# Patient Record
Sex: FEMALE | Race: WHITE | NOT HISPANIC OR LATINO | Employment: UNEMPLOYED | URBAN - METROPOLITAN AREA
[De-identification: names, ages, dates, MRNs, and addresses within clinical notes are randomized per-mention and may not be internally consistent; named-entity substitution may affect disease eponyms.]

---

## 2018-12-16 ENCOUNTER — OFFICE VISIT (OUTPATIENT)
Dept: URGENT CARE | Facility: CLINIC | Age: 43
End: 2018-12-16
Payer: COMMERCIAL

## 2018-12-16 VITALS
HEIGHT: 60 IN | HEART RATE: 83 BPM | DIASTOLIC BLOOD PRESSURE: 98 MMHG | BODY MASS INDEX: 34.36 KG/M2 | OXYGEN SATURATION: 96 % | SYSTOLIC BLOOD PRESSURE: 146 MMHG | WEIGHT: 175 LBS | RESPIRATION RATE: 19 BRPM | TEMPERATURE: 98 F

## 2018-12-16 DIAGNOSIS — J06.9 VIRAL URI WITH COUGH: Primary | ICD-10-CM

## 2018-12-16 PROCEDURE — 96372 THER/PROPH/DIAG INJ SC/IM: CPT | Mod: S$GLB,,, | Performed by: NURSE PRACTITIONER

## 2018-12-16 PROCEDURE — 3008F BODY MASS INDEX DOCD: CPT | Mod: CPTII,S$GLB,, | Performed by: NURSE PRACTITIONER

## 2018-12-16 PROCEDURE — 99214 OFFICE O/P EST MOD 30 MIN: CPT | Mod: 25,S$GLB,, | Performed by: NURSE PRACTITIONER

## 2018-12-16 RX ORDER — FLUTICASONE PROPIONATE 50 MCG
1 SPRAY, SUSPENSION (ML) NASAL DAILY
Qty: 1 BOTTLE | Refills: 0 | Status: SHIPPED | OUTPATIENT
Start: 2018-12-16 | End: 2022-04-29 | Stop reason: SDUPTHER

## 2018-12-16 RX ORDER — BETAMETHASONE SODIUM PHOSPHATE AND BETAMETHASONE ACETATE 3; 3 MG/ML; MG/ML
9 INJECTION, SUSPENSION INTRA-ARTICULAR; INTRALESIONAL; INTRAMUSCULAR; SOFT TISSUE
Status: COMPLETED | OUTPATIENT
Start: 2018-12-16 | End: 2018-12-16

## 2018-12-16 RX ADMIN — BETAMETHASONE SODIUM PHOSPHATE AND BETAMETHASONE ACETATE 9 MG: 3; 3 INJECTION, SUSPENSION INTRA-ARTICULAR; INTRALESIONAL; INTRAMUSCULAR; SOFT TISSUE at 05:12

## 2018-12-16 NOTE — PATIENT INSTRUCTIONS
A cold is caused by a virus that can settle in your nose, throat or lungs. This causes  a runny or stuffy nose and sneezing. You may also have a sore throat, cough, headache, fever and muscle aches. Different cold viruses last different lengths  of time, but the average time is 2 to 14 days.    Seek immediate medical care if you develop fever, chest pain, or shortness of breath.     Treatment  There is no cure for the common cold.     Antibiotics may be used to treat signs of a secondary infection, but they do not treat  the cold virus. Try these tips to  keep yourself comfortable:  -Get plenty of rest.  -Drink plenty of fluids, at least 8 large glasses of fluid a day. Good fluidchoices are water, fruit juices high in Vitamin C, tea, gelatin, or broths and soups. These help to keep mucus thin and ease congestion.  -Use salt water gargle, cough drops or throat sprays to relieve throat pain. Mi ¼ to ½ teaspoon of salt in 1 cup of warm water for a salt water gargle  solution.  -Use petroleum jelly or lip balm around lips and nose to prevent chapping.  -Use saline nose drops or spray to help ease congestion.    Over the Counter (OTC) Medicines:  Take over the counter medicines as needed to ease your signs.  Read labels carefully.  Use a product that treats only the signs that you have. Ask your pharmacist  for recommendations. Be sure to ask about possible interactions with other  medicines you are taking.  Common medicines used to treat signs of a cold include:    #Antihistamines that dry secretions in your nose and lungs. Some of these  may cause you to feel drowsy. Talk to your pharmacist before use if you  have glaucoma or an enlarged prostate.  Names of some medicines in this group include:  - Diphenhydramine  - Brompheniramine  - Chlorpheniramine  - Clemastine    # Decongestants that tighten blood vessels in your nose to decrease  stuffiness and pressure. Use nasal spray decongestants for up to three days  only.  Longer use can make congestion worse. Talk to your pharmacist  before use if you have high blood pressure, heart disease, diabetes or an  enlarged prostate.    Names of some medicines in this group include:  - Pseudoephedrine (Sudafed)- kept behind the counter and requires identification  to purchase in limited quantities because it can be used to make illegal  drugs  - Phenylephrine  - Oxymetazoline nasal spray (Afrin)  - Cough suppressant, also called antitussive, such as dextromethorphan.  This medicine decreases your reflex and sensitivity to cough. This  medicine may be kept behind the pharmacy counter for purchase.  - Expectorant, sometimes called mucolytic, such as guaifenesin (mucinex). This  medicine thins mucus secretions in the lungs to make it easier for you to  cough up and out. (Be sure to drink plenty of fluids when taking this medication)  Cold and cough medicines often contain more than one type of medicine.  Ask the pharmacist for help to confirm that you are not using more than one  product with the same or similar ingredient. For example, some cold and  cough medicines have acetaminophen or ibuprofen in them to help lower a  fever or ease muscle aches. Do not take extra acetaminophen (Tylenol) or  ibuprofen (Advil, Motrin) if the cold or cough medicine has it as an  ingredient. Too much medicine could be harmful.    Take the correct dose as listed on the package. Do not take more than  recommended.    Use a Humidifier:  A cool mist humidifier can make breathing easier by thinning mucus. Do not use  a steam humidifier as hot water can cause burns if spilled.  Place the humidifier a few feet from the bed. Drain and clean each day with  soap and water to prevent bacteria and mold from growing.  Indoor humidity should not be above 50%. Stop using the humidifier if you  notice moisture on windows, walls or pictures.  You do not need to add any medicine to the humidifier.  If you cannot get a  humidifier, place a pan of water next to heating vents and  refill the water level daily. The water will evaporate and add moisture to the  Room.    How to prevent the spread of colds  -Wash your hands with soap and water or use alcohol based hand   often. Dry hands wet from washing with soap on a paper towel instead of cloth towel.  -Cough or sneeze into your elbow to avoid spreading germs.  -Wipe down common surfaces, such as door knobs and faucet handles, with a disinfectant spray.  -Do not share cups or utensils.       -Steroid given here today.  -Flonase daily.  -Sudafed as needed for congestion.  -Mucinex DM.  -Increase your fluid intake.  Please follow up with your Primary care provider within 2-5 days if your signs and symptoms have not resolved or worsen.     If your condition worsens or fails to improve we recommend that you receive another evaluation at the emergency room immediately or contact your primary medical clinic to discuss your concerns.   You must understand that you have received an Urgent Care treatment only and that you may be released before all of your medical problems are known or treated. You, the patient, will arrange for follow up care as instructed.

## 2018-12-16 NOTE — PROGRESS NOTES
Subjective:       Patient ID: Audie Sandoval is a 43 y.o. female.    Vitals:  height is 5' (1.524 m) and weight is 79.4 kg (175 lb). Her oral temperature is 97.6 °F (36.4 °C). Her blood pressure is 146/98 (abnormal) and her pulse is 83. Her respiration is 19 and oxygen saturation is 96%.     Chief Complaint: Sinusitis (nasal congestion, headaches)    Sinusitis   This is a new problem. The current episode started in the past 7 days (2 days ago). The problem has been gradually worsening since onset. There has been no fever. She is experiencing no pain. Associated symptoms include congestion, coughing and headaches. Pertinent negatives include no chills, shortness of breath or sore throat. Treatments tried: Theraflu day. The treatment provided no relief.       Constitution: Negative for chills, fatigue and fever.   HENT: Positive for congestion. Negative for sore throat.    Neck: Negative for painful lymph nodes.   Cardiovascular: Negative for chest pain and leg swelling.   Eyes: Negative for double vision and blurred vision.   Respiratory: Positive for cough. Negative for shortness of breath.    Gastrointestinal: Negative for nausea, vomiting and diarrhea.   Genitourinary: Negative for dysuria, frequency, urgency and history of kidney stones.   Musculoskeletal: Negative for joint pain, joint swelling, muscle cramps and muscle ache.   Skin: Negative for color change, pale, rash and bruising.   Allergic/Immunologic: Negative for seasonal allergies.   Neurological: Positive for headaches. Negative for dizziness, history of vertigo, light-headedness and passing out.   Hematologic/Lymphatic: Negative for swollen lymph nodes.   Psychiatric/Behavioral: Negative for nervous/anxious, sleep disturbance and depression. The patient is not nervous/anxious.        Objective:      Physical Exam   Constitutional: She is oriented to person, place, and time. She appears well-developed and well-nourished. She is cooperative.   Non-toxic appearance. She does not appear ill. No distress.   HENT:   Head: Normocephalic and atraumatic.   Right Ear: Hearing, external ear and ear canal normal. Tympanic membrane is bulging.   Left Ear: Hearing, external ear and ear canal normal. Tympanic membrane is bulging.   Nose: Mucosal edema and rhinorrhea present. No nasal deformity. No epistaxis. Right sinus exhibits no maxillary sinus tenderness and no frontal sinus tenderness. Left sinus exhibits no maxillary sinus tenderness and no frontal sinus tenderness.   Mouth/Throat: Uvula is midline and mucous membranes are normal. No trismus in the jaw. Normal dentition. No uvula swelling. Posterior oropharyngeal erythema present. Tonsils are 1+ on the right. Tonsils are 1+ on the left. No tonsillar exudate.   Eyes: Conjunctivae and lids are normal. No scleral icterus.   Sclera clear bilat   Neck: Trachea normal, full passive range of motion without pain and phonation normal. Neck supple.   Cardiovascular: Normal rate, regular rhythm, normal heart sounds, intact distal pulses and normal pulses.   Pulmonary/Chest: Effort normal and breath sounds normal. No respiratory distress.   Abdominal: Soft. Normal appearance and bowel sounds are normal. She exhibits no distension. There is no tenderness.   Musculoskeletal: Normal range of motion. She exhibits no edema or deformity.   Neurological: She is alert and oriented to person, place, and time. She exhibits normal muscle tone. Coordination normal.   Skin: Skin is warm, dry and intact. She is not diaphoretic. No pallor.   Psychiatric: She has a normal mood and affect. Her speech is normal and behavior is normal. Judgment and thought content normal. Cognition and memory are normal.   Nursing note and vitals reviewed.      Assessment:       1. Viral URI with cough        Plan:         Viral URI with cough  -     betamethasone acetate-betamethasone sodium phosphate injection 9 mg  -     fluticasone (FLONASE) 50  mcg/actuation nasal spray; 1 spray (50 mcg total) by Each Nare route once daily.  Dispense: 1 Bottle; Refill: 0      Patient Instructions   A cold is caused by a virus that can settle in your nose, throat or lungs. This causes  a runny or stuffy nose and sneezing. You may also have a sore throat, cough, headache, fever and muscle aches. Different cold viruses last different lengths  of time, but the average time is 2 to 14 days.    Seek immediate medical care if you develop fever, chest pain, or shortness of breath.     Treatment  There is no cure for the common cold.     Antibiotics may be used to treat signs of a secondary infection, but they do not treat  the cold virus. Try these tips to  keep yourself comfortable:  -Get plenty of rest.  -Drink plenty of fluids, at least 8 large glasses of fluid a day. Good fluidchoices are water, fruit juices high in Vitamin C, tea, gelatin, or broths and soups. These help to keep mucus thin and ease congestion.  -Use salt water gargle, cough drops or throat sprays to relieve throat pain. Mi ¼ to ½ teaspoon of salt in 1 cup of warm water for a salt water gargle  solution.  -Use petroleum jelly or lip balm around lips and nose to prevent chapping.  -Use saline nose drops or spray to help ease congestion.    Over the Counter (OTC) Medicines:  Take over the counter medicines as needed to ease your signs.  Read labels carefully.  Use a product that treats only the signs that you have. Ask your pharmacist  for recommendations. Be sure to ask about possible interactions with other  medicines you are taking.  Common medicines used to treat signs of a cold include:    #Antihistamines that dry secretions in your nose and lungs. Some of these  may cause you to feel drowsy. Talk to your pharmacist before use if you  have glaucoma or an enlarged prostate.  Names of some medicines in this group include:  - Diphenhydramine  - Brompheniramine  - Chlorpheniramine  - Clemastine    #  Decongestants that tighten blood vessels in your nose to decrease  stuffiness and pressure. Use nasal spray decongestants for up to three days  only. Longer use can make congestion worse. Talk to your pharmacist  before use if you have high blood pressure, heart disease, diabetes or an  enlarged prostate.    Names of some medicines in this group include:  - Pseudoephedrine (Sudafed)- kept behind the counter and requires identification  to purchase in limited quantities because it can be used to make illegal  drugs  - Phenylephrine  - Oxymetazoline nasal spray (Afrin)  - Cough suppressant, also called antitussive, such as dextromethorphan.  This medicine decreases your reflex and sensitivity to cough. This  medicine may be kept behind the pharmacy counter for purchase.  - Expectorant, sometimes called mucolytic, such as guaifenesin (mucinex). This  medicine thins mucus secretions in the lungs to make it easier for you to  cough up and out. (Be sure to drink plenty of fluids when taking this medication)  Cold and cough medicines often contain more than one type of medicine.  Ask the pharmacist for help to confirm that you are not using more than one  product with the same or similar ingredient. For example, some cold and  cough medicines have acetaminophen or ibuprofen in them to help lower a  fever or ease muscle aches. Do not take extra acetaminophen (Tylenol) or  ibuprofen (Advil, Motrin) if the cold or cough medicine has it as an  ingredient. Too much medicine could be harmful.    Take the correct dose as listed on the package. Do not take more than  recommended.    Use a Humidifier:  A cool mist humidifier can make breathing easier by thinning mucus. Do not use  a steam humidifier as hot water can cause burns if spilled.  Place the humidifier a few feet from the bed. Drain and clean each day with  soap and water to prevent bacteria and mold from growing.  Indoor humidity should not be above 50%. Stop using the  humidifier if you  notice moisture on windows, walls or pictures.  You do not need to add any medicine to the humidifier.  If you cannot get a humidifier, place a pan of water next to heating vents and  refill the water level daily. The water will evaporate and add moisture to the  Room.    How to prevent the spread of colds  -Wash your hands with soap and water or use alcohol based hand   often. Dry hands wet from washing with soap on a paper towel instead of cloth towel.  -Cough or sneeze into your elbow to avoid spreading germs.  -Wipe down common surfaces, such as door knobs and faucet handles, with a disinfectant spray.  -Do not share cups or utensils.       -Steroid given here today.  -Flonase daily.  -Sudafed as needed for congestion.  -Mucinex DM.  -Increase your fluid intake.  Please follow up with your Primary care provider within 2-5 days if your signs and symptoms have not resolved or worsen.     If your condition worsens or fails to improve we recommend that you receive another evaluation at the emergency room immediately or contact your primary medical clinic to discuss your concerns.   You must understand that you have received an Urgent Care treatment only and that you may be released before all of your medical problems are known or treated. You, the patient, will arrange for follow up care as instructed.

## 2020-11-09 LAB — HPV OTHER HR TYPES: NEGATIVE

## 2020-12-03 ENCOUNTER — OFFICE VISIT (OUTPATIENT)
Dept: URGENT CARE | Facility: CLINIC | Age: 45
End: 2020-12-03
Payer: COMMERCIAL

## 2020-12-03 VITALS
WEIGHT: 175 LBS | TEMPERATURE: 99 F | BODY MASS INDEX: 34.36 KG/M2 | OXYGEN SATURATION: 97 % | DIASTOLIC BLOOD PRESSURE: 105 MMHG | SYSTOLIC BLOOD PRESSURE: 177 MMHG | HEIGHT: 60 IN | RESPIRATION RATE: 14 BRPM | HEART RATE: 140 BPM

## 2020-12-03 DIAGNOSIS — R00.0 TACHYCARDIA WITH HEART RATE 121-140 BEATS PER MINUTE: ICD-10-CM

## 2020-12-03 DIAGNOSIS — U07.1 COVID-19: Primary | ICD-10-CM

## 2020-12-03 DIAGNOSIS — R03.0 ELEVATED BLOOD PRESSURE READING IN OFFICE WITHOUT DIAGNOSIS OF HYPERTENSION: ICD-10-CM

## 2020-12-03 LAB
CTP QC/QA: YES
SARS-COV-2 RDRP RESP QL NAA+PROBE: POSITIVE

## 2020-12-03 PROCEDURE — U0002 COVID-19 LAB TEST NON-CDC: HCPCS | Mod: QW,S$GLB,, | Performed by: STUDENT IN AN ORGANIZED HEALTH CARE EDUCATION/TRAINING PROGRAM

## 2020-12-03 PROCEDURE — U0002: ICD-10-PCS | Mod: QW,S$GLB,, | Performed by: STUDENT IN AN ORGANIZED HEALTH CARE EDUCATION/TRAINING PROGRAM

## 2020-12-03 PROCEDURE — 99214 OFFICE O/P EST MOD 30 MIN: CPT | Mod: S$GLB,,, | Performed by: STUDENT IN AN ORGANIZED HEALTH CARE EDUCATION/TRAINING PROGRAM

## 2020-12-03 PROCEDURE — 3008F BODY MASS INDEX DOCD: CPT | Mod: CPTII,S$GLB,, | Performed by: STUDENT IN AN ORGANIZED HEALTH CARE EDUCATION/TRAINING PROGRAM

## 2020-12-03 PROCEDURE — 99214 PR OFFICE/OUTPT VISIT, EST, LEVL IV, 30-39 MIN: ICD-10-PCS | Mod: S$GLB,,, | Performed by: STUDENT IN AN ORGANIZED HEALTH CARE EDUCATION/TRAINING PROGRAM

## 2020-12-03 PROCEDURE — 3008F PR BODY MASS INDEX (BMI) DOCUMENTED: ICD-10-PCS | Mod: CPTII,S$GLB,, | Performed by: STUDENT IN AN ORGANIZED HEALTH CARE EDUCATION/TRAINING PROGRAM

## 2020-12-04 DIAGNOSIS — U07.1 COVID-19 VIRUS DETECTED: ICD-10-CM

## 2020-12-04 NOTE — PATIENT INSTRUCTIONS
 You have tested positive for COVID-19 today.  Please note that patients who test positive for COVID-19 are required by the CDC to undergo isolation for 10 days after their symptoms first began.     This isolation starts from the day you first developed symptoms, not the day of your positive test. For example, if your symptoms began on a Monday but tested positive on the following Wednesday, your 10-day isolation begins from that Monday, not the Wednesday you tested positive.     However, if you are asymptomatic (a person who does not have any symptoms) and COVID-19 positive, your 10-day isolation begins on the day you tested positive, regardless of exposure date.     Also, per the CDC guidelines, once your 10 days have passed, and you have not had fever greater than 100.4F in the last 24 hours without taking any fever reducers such as Tylenol (Acetaminophen) or Motrin (Ibuprofen), you may return to your normal activities including social distancing, wearing masks, and frequent handwashing - YOU DO NOT NEED ANOTHER TEST IN ORDER TO END YOUR QUARANTINE.

## 2020-12-04 NOTE — PROGRESS NOTES
Subjective:       Patient ID: Audie Sandoval is a 45 y.o. female.    Vitals:  height is 5' (1.524 m) and weight is 79.4 kg (175 lb). Her temperature is 98.6 °F (37 °C). Her blood pressure is 177/105 (abnormal) and her pulse is 128 (abnormal). Her respiration is 14 and oxygen saturation is 97%.     Chief Complaint: Fever    Pt c/o mild fever since Tuesday pm    Fever   This is a new problem. The current episode started in the past 7 days. The problem occurs every several days. Pertinent negatives include no chest pain, congestion, coughing, diarrhea, ear pain, headaches, muscle aches, nausea, rash, sore throat, urinary pain or vomiting. She has tried acetaminophen (steroid injection) for the symptoms. The treatment provided mild relief.     Patient just left an IV infusion place where she received multiple vitamin and steroid infusions.       Constitution: Positive for fever. Negative for chills and fatigue.   HENT: Negative for ear pain, congestion and sore throat.    Neck: Negative for painful lymph nodes.   Cardiovascular: Negative for chest pain and leg swelling.   Eyes: Negative for double vision and blurred vision.   Respiratory: Negative for cough and shortness of breath.    Gastrointestinal: Negative for nausea, vomiting and diarrhea.   Genitourinary: Negative for dysuria, frequency, urgency and history of kidney stones.   Musculoskeletal: Negative for joint pain, joint swelling, muscle cramps and muscle ache.   Skin: Negative for color change, pale, rash and bruising.   Allergic/Immunologic: Negative for seasonal allergies.   Neurological: Negative for dizziness, history of vertigo, light-headedness, passing out and headaches.   Hematologic/Lymphatic: Negative for swollen lymph nodes.   Psychiatric/Behavioral: Negative for nervous/anxious, sleep disturbance and depression. The patient is not nervous/anxious.        Objective:      Physical Exam   Constitutional: She is oriented to person, place, and time.  "No distress.   HENT:   Head: Normocephalic.   Eyes: Conjunctivae are normal. Right eye exhibits no discharge. Left eye exhibits no discharge.   Cardiovascular: Tachycardia present.   Pulmonary/Chest: Effort normal and breath sounds normal. No respiratory distress.   Abdominal: Normal appearance.   Neurological: She is alert and oriented to person, place, and time. Coordination normal.   Skin: Skin is not diaphoretic. Psychiatric: Her behavior is normal. Mood normal.   Nursing note and vitals reviewed.        Assessment:       1. COVID-19    2. Tachycardia with heart rate 121-140 beats per minute        Plan:         COVID-19  -     POCT COVID-19 Rapid Screening    Tachycardia with heart rate 121-140 beats per minute           Tachycardic with -140. No evidence of respiratory distress noted, able to speak in complete sentences without pause.    Requesting COVID-19 testing given symptoms. Tested for COVID-19 today. Rapid test was Positive. Educated on COVID-19 and COVID-19 testing. Advised on COVID-19 precautions.     Due to tachycardia, recommended patient have EKG which she refused stating "I known my body and I'm just anxious." ED evaluation also strongly advised due to concern for arrythmia related to COVID. She refused this also and left AMA.             "

## 2020-12-06 ENCOUNTER — NURSE TRIAGE (OUTPATIENT)
Dept: ADMINISTRATIVE | Facility: CLINIC | Age: 45
End: 2020-12-06

## 2020-12-06 NOTE — TELEPHONE ENCOUNTER
Pt only had a question regarding COVID-19. No triage done.    Reason for Disposition   Information only question and nurse able to answer    Additional Information   Negative: Nursing judgment   Negative: Nursing judgment   Negative: Nursing judgment   Negative: Nursing judgment    Protocols used: INFORMATION ONLY CALL - NO TRIAGE-A-[place]

## 2020-12-08 ENCOUNTER — TELEPHONE (OUTPATIENT)
Dept: INTERNAL MEDICINE | Facility: CLINIC | Age: 45
End: 2020-12-08

## 2020-12-08 ENCOUNTER — NURSE TRIAGE (OUTPATIENT)
Dept: ADMINISTRATIVE | Facility: CLINIC | Age: 45
End: 2020-12-08

## 2020-12-08 ENCOUNTER — OFFICE VISIT (OUTPATIENT)
Dept: URGENT CARE | Facility: CLINIC | Age: 45
End: 2020-12-08
Payer: COMMERCIAL

## 2020-12-08 VITALS
RESPIRATION RATE: 16 BRPM | BODY MASS INDEX: 34.36 KG/M2 | SYSTOLIC BLOOD PRESSURE: 177 MMHG | TEMPERATURE: 99 F | OXYGEN SATURATION: 97 % | HEIGHT: 60 IN | HEART RATE: 88 BPM | DIASTOLIC BLOOD PRESSURE: 115 MMHG | WEIGHT: 175 LBS

## 2020-12-08 DIAGNOSIS — R03.0 ELEVATED BLOOD PRESSURE READING IN OFFICE WITHOUT DIAGNOSIS OF HYPERTENSION: ICD-10-CM

## 2020-12-08 DIAGNOSIS — U07.1 COVID-19: Primary | ICD-10-CM

## 2020-12-08 DIAGNOSIS — I10 HYPERTENSION, UNSPECIFIED TYPE: ICD-10-CM

## 2020-12-08 LAB
B-HCG UR QL: NEGATIVE
BILIRUB UR QL STRIP: NEGATIVE
CTP QC/QA: YES
GLUCOSE SERPL-MCNC: 98 MG/DL (ref 70–110)
GLUCOSE UR QL STRIP: NEGATIVE
KETONES UR QL STRIP: POSITIVE
LEUKOCYTE ESTERASE UR QL STRIP: NEGATIVE
PH, POC UA: 5 (ref 5–8)
POC ANION GAP: 18 MMOL/L (ref 10–20)
POC BLOOD, URINE: NEGATIVE
POC BUN: 9 MMOL/L (ref 8–26)
POC CHLORIDE: 102 MMOL/L (ref 98–109)
POC CREATININE: 0.7 MG/DL (ref 0.6–1.3)
POC HEMATOCRIT: 44 %PCV (ref 37–47)
POC HEMOGLOBIN: 15 G/DL (ref 12.5–16)
POC ICA: 1.13 MMOL/L (ref 1.12–1.32)
POC NITRATES, URINE: NEGATIVE
POC POTASSIUM: 4.3 MMOL/L (ref 3.5–4.9)
POC SODIUM: 137 MMOL/L (ref 138–146)
POC TCO2: 23 MMOL/L (ref 24–29)
PROT UR QL STRIP: NEGATIVE
SP GR UR STRIP: 1.01 (ref 1–1.03)
UROBILINOGEN UR STRIP-ACNC: ABNORMAL (ref 0.1–1.1)

## 2020-12-08 PROCEDURE — 80047 POCT CHEMISTRY PANEL: ICD-10-PCS | Mod: QW,S$GLB,, | Performed by: PHYSICIAN ASSISTANT

## 2020-12-08 PROCEDURE — 3008F PR BODY MASS INDEX (BMI) DOCUMENTED: ICD-10-PCS | Mod: CPTII,S$GLB,, | Performed by: PHYSICIAN ASSISTANT

## 2020-12-08 PROCEDURE — 99214 OFFICE O/P EST MOD 30 MIN: CPT | Mod: 25,S$GLB,, | Performed by: PHYSICIAN ASSISTANT

## 2020-12-08 PROCEDURE — 81003 POCT URINALYSIS, DIPSTICK, AUTOMATED, W/O SCOPE: ICD-10-PCS | Mod: QW,S$GLB,, | Performed by: PHYSICIAN ASSISTANT

## 2020-12-08 PROCEDURE — 80047 BASIC METABLC PNL IONIZED CA: CPT | Mod: QW,S$GLB,, | Performed by: PHYSICIAN ASSISTANT

## 2020-12-08 PROCEDURE — 81003 URINALYSIS AUTO W/O SCOPE: CPT | Mod: QW,S$GLB,, | Performed by: PHYSICIAN ASSISTANT

## 2020-12-08 PROCEDURE — 3008F BODY MASS INDEX DOCD: CPT | Mod: CPTII,S$GLB,, | Performed by: PHYSICIAN ASSISTANT

## 2020-12-08 PROCEDURE — 71046 XR CHEST PA AND LATERAL: ICD-10-PCS | Mod: FY,S$GLB,, | Performed by: RADIOLOGY

## 2020-12-08 PROCEDURE — 99214 PR OFFICE/OUTPT VISIT, EST, LEVL IV, 30-39 MIN: ICD-10-PCS | Mod: 25,S$GLB,, | Performed by: PHYSICIAN ASSISTANT

## 2020-12-08 PROCEDURE — 71046 X-RAY EXAM CHEST 2 VIEWS: CPT | Mod: FY,S$GLB,, | Performed by: RADIOLOGY

## 2020-12-08 RX ORDER — AMLODIPINE BESYLATE 5 MG/1
5 TABLET ORAL DAILY
Qty: 30 TABLET | Refills: 0 | Status: SHIPPED | OUTPATIENT
Start: 2020-12-08 | End: 2020-12-15 | Stop reason: SDUPTHER

## 2020-12-08 NOTE — TELEPHONE ENCOUNTER
Spoke with pt and informed her we are not accepting new patients.    Set her up for new pt appt with provider at [facility].    ----- Message from Nacho Eaton sent at 12/8/2020  2:04 PM CST -----  Regarding: schedule  Contact: pt [phone]  Pt is calling to schedule an appt as a new pt. Please contact pt

## 2020-12-08 NOTE — PATIENT INSTRUCTIONS
Start amlodipine  Return or follow up with primary care in 2 days for blood pressure check  Make appointment with family medicine for follow up  Call [phone] for appointment first available    Please monitor blood pressure at home and keep log  Go to the ER for new, worsened or changing symptoms, chest pain, trouble breathing, visual changes or other concern      Please arrange follow up with your primary medical clinic as soon as possible. You must understand that you've received an Urgent Care treatment only and that you may be released before all of your medical problems are known or treated. You, the patient, will arrange for follow up as instructed. If your symptoms worsen or fail to improve you should go to the Emergency Room.  WE CANNOT RULE OUT ALL POSSIBLE CAUSES OF YOUR SYMPTOMS IN THE URGENT CARE SETTING PLEASE GO TO THE ER IF YOU FEELS YOUR CONDITION IS WORSENING OR YOU WOULD LIKE EMERGENT EVALUATION.        High Blood Pressure, New, Begin Treatment  Your blood pressure was high enough today to start treatment with medicines. Often health care providers dont know what causes high blood pressure (hypertension). But it can be controlled with lifestyle changes and medicines. High blood pressure usually has no symptoms. But it can sometimes cause headache, dizziness, blurred vision, a rushing sound in your ears, chest pain, or shortness of breath. But even without symptoms, high blood pressure thats not treated raises your risk for heart attack and stroke. High blood pressure is a serious health risk and shouldnt be ignored.    A blood pressure reading is made up of two numbers: a higher number over a lower number. The top number is the systolic pressure. The bottom number is the diastolic pressure. A normal blood pressure is a systolic pressure of less than 120 over a diastolic pressure less than 80. You will see your blood pressure readings written together. For example, a person with a systolic  pressure of 118 and a diastolic pressure of 78 will have 118/78 written in the medical record.  High blood pressure is when either the top number is 140 or higher, or the bottom number is 90 or higher. High blood pressure is diagnosed when multiple, separate readings show blood pressures above 140/90. The blood pressures between normal and high are called prehypertension.  Home care  If you have high blood pressure, you should do what is listed below to lower your blood pressure. If you are taking medicines for high blood pressure, these methods may reduce or end your need for medicines in the future.  · Begin a weight-loss program if you are overweight.  · Cut back on how much salt you get in your diet. Heres how to do this:  ¨ Dont eat foods that have a lot of salt. These include olives, pickles, smoked meats, and salted potato chips.  ¨ Dont add salt to your food at the table.  ¨ Use only small amounts of salt when cooking.  ¨ Review food labels to track how much salt is in prepared foods.  ¨ When eating out, ask that no additional salt be added to your food order.  · Begin an exercise program. Talk with your health care provider about the type of exercise program that would be best for you. It doesn't have to be hard. Even brisk walking for 20 minutes 3 times a week is a good form of exercise.  · Dont take medicines that have heart stimulants. This includes many over-the-counter cold and sinus decongestant pills and sprays, as well as diet pills. Check the warnings about hypertension on the label. Before purchasing any over-the-counter medicines or supplements, always ask the pharmacist about the product's potential interaction with your high blood pressure and your high blood pressure medicines.  · Stimulants such as amphetamine or cocaine could be lethal for someone with high blood pressure. Never take these.  · Limit how much caffeine you get in your diet. Switch to caffeine-free products.  · Stop  smoking. If you are a long-time smoker, this can be hard. Enroll in a stop-smoking program to make it more likely that you will quit for good.  · Learn how to handle stress. This is an important part of any program to lower blood pressure. Learn about relaxation methods like meditation, yoga, or biofeedback.  · If your provider prescribed medicines, take them exactly as directed. Missing doses may cause your blood pressure get out of control.  · If you miss a dose or doses, check with your healthcare provider or pharmacist about what to do.  · Consider buying an automatic blood pressure machine. Your provider can make a recommendation. You can get one of these at most pharmacies.  The American Heart Association recommends the following guidelines for home blood pressure monitoring:  · Don't smoke or drink coffee for 30 minutes before taking your blood pressure.  · Go to the bathroom before the test.  · Relax for 5 minutes before taking the measurement.  · Sit with your back supported (don't sit on a couch or soft chair); keep your feet on the floor uncrossed. Place your arm on a solid flat surface (like a table) with the upper part of the arm at heart level. Place the middle of the cuff directly above the eye of the elbow. Check the monitor's instruction manual for an illustration.  · Take multiple readings. When you measure, take 2 to 3 readings one minute apart and record all of the results.  · Take your blood pressure at the same time every day, or as your healthcare provider recommends.  · Record the date, time, and blood pressure reading.  · Take the record with you to your next medical appointment. If your blood pressure monitor has a built-in memory, simply take the monitor with you to your next appointment.  · Call your provider if you have several high readings. Don't be frightened by a single high blood pressure reading, but if you get several high readings, check in with your healthcare provider.  · Note:  When blood pressure reaches a systolic (top number) of 180 or higher OR diastolic (bottom number) of 110 or higher, seek emergency medical treatment.  Follow-up care  Because a new blood pressure medicine was started today, its important that you have your blood pressure rechecked. This is to make sure that the medicine is working and that you have no serious side effects. Keep all your follow up appointments. Write down medicine and blood pressure questions and bring them to your next appointment. If you have pressing concerns about your new medicine or your blood pressure, call your provider. Unless told otherwise, follow up with your health care provider or this facility within the next 3 days.  When to seek medical advice  Call your healthcare provider right away if any of these occur:  · Blood pressure reaches a systolic (top number) of 180 or higher, OR diastolic (bottom number) of 110 or higher, seek emergency medical treatment.  · Chest pain or shortness of breath  · Severe headache  · Throbbing or rushing sound in the ears  · Nosebleed  · Sudden severe pain in your belly (abdomen)  · Extreme drowsiness, confusion, or fainting  · Dizziness or dizziness with a spinning sensation (vertigo)  · Weakness of an arm or leg or one side of the face  · You have problems speaking or seeing   Date Last Reviewed: 12/1/2016  © 3113-5293 [employer]. [address] [place] [address], [place] [id number]. All rights reserved. This information is not intended as a substitute for professional medical care. Always follow your healthcare professional's instructions.        Discharge Instructions: Taking Your Blood Pressure  Blood pressure is the force of blood as it moves from the heart through the blood vessels. You can take your own blood pressure reading using a digital monitor. Take readings as often as your healthcare provider instructs. Take your readings each time in the same way, using the same arm. Here are  guidelines for taking your blood pressure.  The American Heart Association (AHA) recommends purchasing a blood pressure monitor that is validated and approved by the Association for the Advancement of Medical Instrumentation, the [place] Hypertension Society, and the International Protocol for the Validation of Automated BP Measuring Devices. If the blood pressure monitor is for a senior adult, a pregnant woman, or a child, make certain it is validated for use with such a population. For the most reliable readings, the AHA recommends an automatic, cuff-style, upper arm (bicep) monitor. The readings from finger and wrist monitors are not as reliable as the upper arm monitor.        Step 1. Relax    · Wait at least a half hour after smoking, eating, or exercising. Do not drink coffee, tea, soda, or other caffeinated beverages before checking your blood pressure.   · Sit comfortably at a table. Place the monitor near you.  · Rest for a few minutes before you begin.        Step 2. Wrap the cuff    · Place your arm on the table, palm up. Put your arm in a position that is level with your heart. Wrap the cuff around your upper arm, about an inch above your elbow. Its best to wrap the cuff on bare skin, not over clothing.  · Make sure your cuff fits. If it doesnt wrap around your upper arm, order a larger cuff. A cuff that is too large or too small can result in an inaccurate blood pressure reading.           Step 3. Inflate the cuff    · Pump the cuff until the scale reads 200. If you have a self-inflating cuff, push the button that starts the pump.  · The cuff will tighten, then loosen.  · The numbers will change. When they stop changing, your blood pressure reading will appear.  · If you get a reading that is too high or too low for you, relax for a few minutes. Then do the test again.    Step 4. Write down the results  · Write down your blood pressure numbers. Aneudy the date and time. Keep your results in one  place, such as a notebook.  · Remove the cuff from your arm. Turn off the machine.  · Take the readings with you to your medical appointments.  · If you start a new blood pressure medicine, or change a blood pressure medicine dose, note the day you started the new drug or dosage on your blood pressure recording sheet. This will help your healthcare provider monitor the effect of medication changes.     Date Last Reviewed: 4/27/2016  © 7959-2037 [employer]. [address] [place] [address], [place] [address]. All rights reserved. This information is not intended as a substitute for professional medical care. Always follow your healthcare professional's instructions.

## 2020-12-08 NOTE — TELEPHONE ENCOUNTER
Patient is covid-19+ and also is experiencing elevated blood pressure.  Hx of mildly elevated blood pressure, untreated.  She's been elevated for the 7 days, 160's/110's.

## 2020-12-08 NOTE — PROGRESS NOTES
Subjective:       Patient ID: Audie Sandoval is a 45 y.o. female.    Vitals:  height is 5' (1.524 m) and weight is 79.4 kg (175 lb). Her tympanic temperature is 99.1 °F (37.3 °C). Her blood pressure is 177/115 (abnormal) and her pulse is 88. Her respiration is 16 and oxygen saturation is 97%.     Chief Complaint: Hypertension        45 yr old female c/o elevated blood pressure readings. States elevated blood pressure have been occurring over the past 7 years. Takes her BP at home regularly with an automatic cuff and she states that it usually runs systolic 150-170's and diastolic /s at home. She was on previous BP meds during pregnancy but unsure what meds they were. She denies headache, chest pain, blurred vision, focal deficit, unilateral weakness.   She is covid positive, this is her 7th day of isolation - but she denies any covid symptoms and states she feels well. She feels well otherwise. Denies chest pain or palpitations or presyncope.   She is requesting to be started on a BP medication and set up with pcp follow up after covid isolation. States she had regular labs with obgyn on 11/20/20 and states they were all normal. States her BP was 160/90's at that visit.     Of note was seen here 12/3 and recommended to go to the ED with tachycardia and covid positive status. She states she refused at the time. Since then, her tachycardia has improved with HR in 90's-110's at home.     Hypertension  This is a new problem. The current episode started more than 1 year ago. The problem has been gradually worsening since onset. Associated symptoms include anxiety (states she is very anxious about her elvated blood pressure). Pertinent negatives include no blurred vision, chest pain, headaches, malaise/fatigue, neck pain, orthopnea, palpitations, peripheral edema, PND, shortness of breath or sweats. There are no associated agents to hypertension. There are no known risk factors for coronary artery disease.  Treatments tried: she does not remember. The current treatment provides significant improvement. There are no compliance problems.        Constitution: Negative for chills, fatigue and fever.   HENT: Negative for congestion and sore throat.    Neck: Negative for neck pain and painful lymph nodes.   Cardiovascular: Negative for chest pain, leg swelling and palpitations.   Eyes: Negative for double vision and blurred vision.   Respiratory: Negative for cough and shortness of breath.    Gastrointestinal: Negative for nausea, vomiting and diarrhea.   Genitourinary: Negative for dysuria, frequency, urgency and history of kidney stones.   Musculoskeletal: Negative for joint pain, joint swelling, muscle cramps and muscle ache.   Skin: Negative for color change, pale, rash and bruising.   Allergic/Immunologic: Negative for seasonal allergies.   Neurological: Negative for dizziness, history of vertigo, light-headedness, passing out and headaches.   Hematologic/Lymphatic: Negative for swollen lymph nodes.   Psychiatric/Behavioral: Negative for nervous/anxious, sleep disturbance and depression. The patient is not nervous/anxious.        Objective:      Physical Exam   Constitutional: She is oriented to person, place, and time. She appears well-developed. She is cooperative.  Non-toxic appearance. She does not appear ill. No distress.   HENT:   Head: Normocephalic and atraumatic.   Ears:   Right Ear: Hearing, tympanic membrane, external ear and ear canal normal.   Left Ear: Hearing, tympanic membrane, external ear and ear canal normal.   Nose: Nose normal. No mucosal edema, rhinorrhea or nasal deformity. No epistaxis. Right sinus exhibits no maxillary sinus tenderness and no frontal sinus tenderness. Left sinus exhibits no maxillary sinus tenderness and no frontal sinus tenderness.   Mouth/Throat: Uvula is midline, oropharynx is clear and moist and mucous membranes are normal. No trismus in the jaw. Normal dentition. No uvula  swelling. No posterior oropharyngeal erythema.   Eyes: Pupils are equal, round, and reactive to light. Conjunctivae and lids are normal. Right eye exhibits no discharge. Left eye exhibits no discharge. No scleral icterus. Pupils are equal.   Neck: Trachea normal, normal range of motion, full passive range of motion without pain and phonation normal. Neck supple.   Cardiovascular: Normal rate, regular rhythm, normal heart sounds and normal pulses.   Pulmonary/Chest: Effort normal and breath sounds normal. No respiratory distress.   Abdominal: Soft. Normal appearance and bowel sounds are normal. She exhibits no distension, no pulsatile midline mass and no mass. There is no abdominal tenderness.   Musculoskeletal: Normal range of motion.         General: No deformity.   Neurological: She is alert and oriented to person, place, and time. She has normal motor skills, normal sensation and intact cranial nerves. No cranial nerve deficit. She exhibits normal muscle tone. Coordination normal. Coordination normal. GCS eye subscore is 4. GCS verbal subscore is 5. GCS motor subscore is 6.   Skin: Skin is warm, dry, intact, not diaphoretic and not pale. Psychiatric: Her speech is normal and behavior is normal. Judgment and thought content normal.   Nursing note and vitals reviewed.    Results for orders placed or performed in visit on 12/08/20   POCT Urinalysis, Dipstick, Automated, W/O Scope   Result Value Ref Range    POC Blood, Urine Negative Negative    POC Bilirubin, Urine Negative Negative    POC Urobilinogen, Urine norm 0.1 - 1.1    POC Ketones, Urine Positive (A) Negative    POC Protein, Urine Negative Negative    POC Nitrates, Urine Negative Negative    POC Glucose, Urine Negative Negative    pH, UA 5.0 5 - 8    POC Specific Gravity, Urine 1.015 1.003 - 1.029    POC Leukocytes, Urine Negative Negative   POCT urine pregnancy   Result Value Ref Range    POC Preg Test, Ur Negative Negative     Acceptable Yes     POCT Chemistry Panel   Result Value Ref Range    POC Sodium 137 (A) 138 - 146 MMOL/L    POC Potassium 4.3 3.5 - 4.9 MMOL/L    POC Chloride 102 98 - 109 MMOL/L    POC BUN 9 8 - 26 MMOL/L    POC Glucose 98 70 - 110 MG/DL    POC Creatinine 0.7 0.6 - 1.3 mg/dL    POC iCA 1.13 1.12 - 1.32 MMOL/L    POC TCO2 23 (A) 24 - 29 MMOL/L    POC Hematocrit 44 37 - 47 %PCV    POC Hemoglobin 15.0 12.5 - 16 g/dL    POC Anion Gap 18 10.0 - 20 MMOL/L     X-ray Chest Pa And Lateral    Result Date: 12/8/2020  EXAMINATION: XR CHEST PA AND LATERAL CLINICAL HISTORY: COVID-19 TECHNIQUE: PA and lateral views of the chest were performed. COMPARISON: None FINDINGS: Normal heart size.  Normal pulmonary vasculature.  No focal infiltrates.  No free pleural fluid.  Vertebral body degenerative spondylosis changes     No active cardiac or pulmonary findings. Electronically signed by: Ridge Schmitt Date:    12/08/2020 Time:    11:28  Results for orders placed or performed in visit on 12/08/20   POCT Urinalysis, Dipstick, Automated, W/O Scope   Result Value Ref Range    POC Blood, Urine Negative Negative    POC Bilirubin, Urine Negative Negative    POC Urobilinogen, Urine norm 0.1 - 1.1    POC Ketones, Urine Positive (A) Negative    POC Protein, Urine Negative Negative    POC Nitrates, Urine Negative Negative    POC Glucose, Urine Negative Negative    pH, UA 5.0 5 - 8    POC Specific Gravity, Urine 1.015 1.003 - 1.029    POC Leukocytes, Urine Negative Negative   POCT urine pregnancy   Result Value Ref Range    POC Preg Test, Ur Negative Negative     Acceptable Yes    POCT Chemistry Panel   Result Value Ref Range    POC Sodium 137 (A) 138 - 146 MMOL/L    POC Potassium 4.3 3.5 - 4.9 MMOL/L    POC Chloride 102 98 - 109 MMOL/L    POC BUN 9 8 - 26 MMOL/L    POC Glucose 98 70 - 110 MG/DL    POC Creatinine 0.7 0.6 - 1.3 mg/dL    POC iCA 1.13 1.12 - 1.32 MMOL/L    POC TCO2 23 (A) 24 - 29 MMOL/L    POC Hematocrit 44 37 - 47 %PCV    POC  Hemoglobin 15.0 12.5 - 16 g/dL    POC Anion Gap 18 10.0 - 20 MMOL/L           Assessment:       1. COVID-19    2. Elevated blood pressure reading in office without diagnosis of hypertension    3. Hypertension, unspecified type        Plan:         Pt with chronically elevated blood pressure over 7 years. Asymptomatic and denies HA, chest pain or blurred vision. Neuro exam WNL.   Will start pt on amlodipine 5mg daily at this time. Recommend single medication as starting dose because pt does report some readings at home of 150-160's/90's  Recommend f/u in 2 days for BP check either with pcp or here to ensure not too low.   Keep log at home. Increase fluids and rest.   Given fam med referral and recommend f/u first available. She will be done with covid isolation this Friday 10/11.    For covid, please increase fluids, rest. Go to the ER for worsening or changes. F/u pcp after isolation.    I discussed plan at length with pt and she is happy with this plan. All questions answered    COVID-19  -     POCT Urinalysis, Dipstick, Automated, W/O Scope  -     POCT urine pregnancy  -     X-Ray Chest PA And Lateral; Future; Expected date: 12/08/2020    Elevated blood pressure reading in office without diagnosis of hypertension  -     POCT Urinalysis, Dipstick, Automated, W/O Scope  -     POCT urine pregnancy  -     X-Ray Chest PA And Lateral; Future; Expected date: 12/08/2020  -     Ambulatory referral/consult to Family Practice  -     POCT Chemistry Panel  -     [facility]    Hypertension, unspecified type  -     amLODIPine (NORVASC) 5 MG tablet; Take 1 tablet (5 mg total) by mouth once daily.  Dispense: 30 tablet; Refill: 0         Labs reviewed, pertinent imaging reviewed, previous medical records, medical history, surgical history, social history, family history reviewed.    Patient Instructions   Start amlodipine  Return or follow up with primary care in 2 days for blood pressure check  Make  appointment with family medicine for follow up  Call [phone] for appointment first available    Please monitor blood pressure at home and keep log  Go to the ER for new, worsened or changing symptoms, chest pain, trouble breathing, visual changes or other concern      Please arrange follow up with your primary medical clinic as soon as possible. You must understand that you've received an Urgent Care treatment only and that you may be released before all of your medical problems are known or treated. You, the patient, will arrange for follow up as instructed. If your symptoms worsen or fail to improve you should go to the Emergency Room.  WE CANNOT RULE OUT ALL POSSIBLE CAUSES OF YOUR SYMPTOMS IN THE URGENT CARE SETTING PLEASE GO TO THE ER IF YOU FEELS YOUR CONDITION IS WORSENING OR YOU WOULD LIKE EMERGENT EVALUATION.        High Blood Pressure, New, Begin Treatment  Your blood pressure was high enough today to start treatment with medicines. Often health care providers dont know what causes high blood pressure (hypertension). But it can be controlled with lifestyle changes and medicines. High blood pressure usually has no symptoms. But it can sometimes cause headache, dizziness, blurred vision, a rushing sound in your ears, chest pain, or shortness of breath. But even without symptoms, high blood pressure thats not treated raises your risk for heart attack and stroke. High blood pressure is a serious health risk and shouldnt be ignored.    A blood pressure reading is made up of two numbers: a higher number over a lower number. The top number is the systolic pressure. The bottom number is the diastolic pressure. A normal blood pressure is a systolic pressure of less than 120 over a diastolic pressure less than 80. You will see your blood pressure readings written together. For example, a person with a systolic pressure of 118 and a diastolic pressure of 78 will have 118/78 written in the medical record.   High blood pressure is when either the top number is 140 or higher, or the bottom number is 90 or higher. High blood pressure is diagnosed when multiple, separate readings show blood pressures above 140/90. The blood pressures between normal and high are called prehypertension.  Home care  If you have high blood pressure, you should do what is listed below to lower your blood pressure. If you are taking medicines for high blood pressure, these methods may reduce or end your need for medicines in the future.  · Begin a weight-loss program if you are overweight.  · Cut back on how much salt you get in your diet. Heres how to do this:  ¨ Dont eat foods that have a lot of salt. These include olives, pickles, smoked meats, and salted potato chips.  ¨ Dont add salt to your food at the table.  ¨ Use only small amounts of salt when cooking.  ¨ Review food labels to track how much salt is in prepared foods.  ¨ When eating out, ask that no additional salt be added to your food order.  · Begin an exercise program. Talk with your health care provider about the type of exercise program that would be best for you. It doesn't have to be hard. Even brisk walking for 20 minutes 3 times a week is a good form of exercise.  · Dont take medicines that have heart stimulants. This includes many over-the-counter cold and sinus decongestant pills and sprays, as well as diet pills. Check the warnings about hypertension on the label. Before purchasing any over-the-counter medicines or supplements, always ask the pharmacist about the product's potential interaction with your high blood pressure and your high blood pressure medicines.  · Stimulants such as amphetamine or cocaine could be lethal for someone with high blood pressure. Never take these.  · Limit how much caffeine you get in your diet. Switch to caffeine-free products.  · Stop smoking. If you are a long-time smoker, this can be hard. Enroll in a stop-smoking program to make it  more likely that you will quit for good.  · Learn how to handle stress. This is an important part of any program to lower blood pressure. Learn about relaxation methods like meditation, yoga, or biofeedback.  · If your provider prescribed medicines, take them exactly as directed. Missing doses may cause your blood pressure get out of control.  · If you miss a dose or doses, check with your healthcare provider or pharmacist about what to do.  · Consider buying an automatic blood pressure machine. Your provider can make a recommendation. You can get one of these at most pharmacies.  The American Heart Association recommends the following guidelines for home blood pressure monitoring:  · Don't smoke or drink coffee for 30 minutes before taking your blood pressure.  · Go to the bathroom before the test.  · Relax for 5 minutes before taking the measurement.  · Sit with your back supported (don't sit on a couch or soft chair); keep your feet on the floor uncrossed. Place your arm on a solid flat surface (like a table) with the upper part of the arm at heart level. Place the middle of the cuff directly above the eye of the elbow. Check the monitor's instruction manual for an illustration.  · Take multiple readings. When you measure, take 2 to 3 readings one minute apart and record all of the results.  · Take your blood pressure at the same time every day, or as your healthcare provider recommends.  · Record the date, time, and blood pressure reading.  · Take the record with you to your next medical appointment. If your blood pressure monitor has a built-in memory, simply take the monitor with you to your next appointment.  · Call your provider if you have several high readings. Don't be frightened by a single high blood pressure reading, but if you get several high readings, check in with your healthcare provider.  · Note: When blood pressure reaches a systolic (top number) of 180 or higher OR diastolic (bottom number) of  110 or higher, seek emergency medical treatment.  Follow-up care  Because a new blood pressure medicine was started today, its important that you have your blood pressure rechecked. This is to make sure that the medicine is working and that you have no serious side effects. Keep all your follow up appointments. Write down medicine and blood pressure questions and bring them to your next appointment. If you have pressing concerns about your new medicine or your blood pressure, call your provider. Unless told otherwise, follow up with your health care provider or this facility within the next 3 days.  When to seek medical advice  Call your healthcare provider right away if any of these occur:  · Blood pressure reaches a systolic (top number) of 180 or higher, OR diastolic (bottom number) of 110 or higher, seek emergency medical treatment.  · Chest pain or shortness of breath  · Severe headache  · Throbbing or rushing sound in the ears  · Nosebleed  · Sudden severe pain in your belly (abdomen)  · Extreme drowsiness, confusion, or fainting  · Dizziness or dizziness with a spinning sensation (vertigo)  · Weakness of an arm or leg or one side of the face  · You have problems speaking or seeing   Date Last Reviewed: 12/1/2016  © 4211-4099 [employer]. [address] [place] [address]. All rights reserved. This information is not intended as a substitute for professional medical care. Always follow your healthcare professional's instructions.        Discharge Instructions: Taking Your Blood Pressure  Blood pressure is the force of blood as it moves from the heart through the blood vessels. You can take your own blood pressure reading using a digital monitor. Take readings as often as your healthcare provider instructs. Take your readings each time in the same way, using the same arm. Here are guidelines for taking your blood pressure.  The American Heart Association (AHA) recommends purchasing a  blood pressure monitor that is validated and approved by the Association for the Advancement of Medical Instrumentation, the [place] Hypertension Society, and the International Protocol for the Validation of Automated BP Measuring Devices. If the blood pressure monitor is for a senior adult, a pregnant woman, or a child, make certain it is validated for use with such a population. For the most reliable readings, the AHA recommends an automatic, cuff-style, upper arm (bicep) monitor. The readings from finger and wrist monitors are not as reliable as the upper arm monitor.        Step 1. Relax    · Wait at least a half hour after smoking, eating, or exercising. Do not drink coffee, tea, soda, or other caffeinated beverages before checking your blood pressure.   · Sit comfortably at a table. Place the monitor near you.  · Rest for a few minutes before you begin.        Step 2. Wrap the cuff    · Place your arm on the table, palm up. Put your arm in a position that is level with your heart. Wrap the cuff around your upper arm, about an inch above your elbow. Its best to wrap the cuff on bare skin, not over clothing.  · Make sure your cuff fits. If it doesnt wrap around your upper arm, order a larger cuff. A cuff that is too large or too small can result in an inaccurate blood pressure reading.           Step 3. Inflate the cuff    · Pump the cuff until the scale reads 200. If you have a self-inflating cuff, push the button that starts the pump.  · The cuff will tighten, then loosen.  · The numbers will change. When they stop changing, your blood pressure reading will appear.  · If you get a reading that is too high or too low for you, relax for a few minutes. Then do the test again.    Step 4. Write down the results  · Write down your blood pressure numbers. Aneudy the date and time. Keep your results in one place, such as a notebook.  · Remove the cuff from your arm. Turn off the machine.  · Take the readings with you  to your medical appointments.  · If you start a new blood pressure medicine, or change a blood pressure medicine dose, note the day you started the new drug or dosage on your blood pressure recording sheet. This will help your healthcare provider monitor the effect of medication changes.     Date Last Reviewed: 4/27/2016  © 0961-3080 [employer]. [address] [place] [address], [place] [address]. All rights reserved. This information is not intended as a substitute for professional medical care. Always follow your healthcare professional's instructions.

## 2020-12-08 NOTE — TELEPHONE ENCOUNTER
Asymptomatic for cardiac or neurological symptoms, but does still have covid-19 like symptoms.  She declines ED or UCC, declines RR, not set up with MyChart and not established with a PCP, but is agreeable to [facility] visit.  Assisted with setting up an appt for [facility].  Reason for Disposition   Patient wants to be seen    Additional Information   Negative: Sounds like a life-threatening emergency to the triager   Negative: Pregnant > 20 weeks or postpartum (< 6 weeks after delivery) and new hand or face swelling   Negative: Pregnant > 20 weeks and BP > 140/90   Negative: Systolic BP >= 160 OR Diastolic >= 100, and any cardiac or neurologic symptoms (e.g., chest pain, difficulty breathing, unsteady gait, blurred vision)   Negative: Patient sounds very sick or weak to the triager   Negative: BP Systolic BP >= 140 OR Diastolic >= 90 and postpartum (from 0 to 6 weeks after delivery)   Negative: Systolic BP >= 180 OR Diastolic >= 110, and missed most recent dose of blood pressure medication   Negative: Ran out of BP medications   Negative: Taking BP medications and feels is having side effects (e.g., impotence, cough, dizziness)   Negative: Systolic BP >= 180 OR Diastolic >= 110    Protocols used: HIGH BLOOD PRESSURE-A-OH

## 2020-12-09 ENCOUNTER — PATIENT MESSAGE (OUTPATIENT)
Dept: INTERNAL MEDICINE | Facility: CLINIC | Age: 45
End: 2020-12-09

## 2020-12-10 ENCOUNTER — PATIENT MESSAGE (OUTPATIENT)
Dept: INTERNAL MEDICINE | Facility: CLINIC | Age: 45
End: 2020-12-10

## 2020-12-15 ENCOUNTER — OFFICE VISIT (OUTPATIENT)
Dept: INTERNAL MEDICINE | Facility: CLINIC | Age: 45
End: 2020-12-15
Payer: COMMERCIAL

## 2020-12-15 ENCOUNTER — LAB VISIT (OUTPATIENT)
Dept: LAB | Facility: HOSPITAL | Age: 45
End: 2020-12-15
Attending: FAMILY MEDICINE
Payer: COMMERCIAL

## 2020-12-15 VITALS
SYSTOLIC BLOOD PRESSURE: 140 MMHG | DIASTOLIC BLOOD PRESSURE: 92 MMHG | BODY MASS INDEX: 36.4 KG/M2 | WEIGHT: 185.44 LBS | HEIGHT: 60 IN | HEART RATE: 88 BPM | TEMPERATURE: 98 F

## 2020-12-15 DIAGNOSIS — Z00.00 WELL ADULT EXAM: ICD-10-CM

## 2020-12-15 DIAGNOSIS — Z86.16 HISTORY OF 2019 NOVEL CORONAVIRUS DISEASE (COVID-19): ICD-10-CM

## 2020-12-15 DIAGNOSIS — Z00.00 WELL ADULT EXAM: Primary | ICD-10-CM

## 2020-12-15 DIAGNOSIS — I10 ESSENTIAL HYPERTENSION: ICD-10-CM

## 2020-12-15 DIAGNOSIS — E66.9 OBESITY, UNSPECIFIED CLASSIFICATION, UNSPECIFIED OBESITY TYPE, UNSPECIFIED WHETHER SERIOUS COMORBIDITY PRESENT: ICD-10-CM

## 2020-12-15 LAB
25(OH)D3+25(OH)D2 SERPL-MCNC: 38 NG/ML (ref 30–96)
ALBUMIN SERPL BCP-MCNC: 4.1 G/DL (ref 3.5–5.2)
ALP SERPL-CCNC: 54 U/L (ref 55–135)
ALT SERPL W/O P-5'-P-CCNC: 46 U/L (ref 10–44)
ANION GAP SERPL CALC-SCNC: 10 MMOL/L (ref 8–16)
AST SERPL-CCNC: 31 U/L (ref 10–40)
BASOPHILS # BLD AUTO: 0.02 K/UL (ref 0–0.2)
BASOPHILS NFR BLD: 0.4 % (ref 0–1.9)
BILIRUB SERPL-MCNC: 0.4 MG/DL (ref 0.1–1)
BUN SERPL-MCNC: 11 MG/DL (ref 6–20)
CALCIUM SERPL-MCNC: 9.3 MG/DL (ref 8.7–10.5)
CHLORIDE SERPL-SCNC: 103 MMOL/L (ref 95–110)
CHOLEST SERPL-MCNC: 181 MG/DL (ref 120–199)
CHOLEST/HDLC SERPL: 5.2 {RATIO} (ref 2–5)
CO2 SERPL-SCNC: 25 MMOL/L (ref 23–29)
CREAT SERPL-MCNC: 0.8 MG/DL (ref 0.5–1.4)
DIFFERENTIAL METHOD: ABNORMAL
EOSINOPHIL # BLD AUTO: 0.2 K/UL (ref 0–0.5)
EOSINOPHIL NFR BLD: 2.7 % (ref 0–8)
ERYTHROCYTE [DISTWIDTH] IN BLOOD BY AUTOMATED COUNT: 12.5 % (ref 11.5–14.5)
EST. GFR  (AFRICAN AMERICAN): >60 ML/MIN/1.73 M^2
EST. GFR  (NON AFRICAN AMERICAN): >60 ML/MIN/1.73 M^2
ESTIMATED AVG GLUCOSE: 105 MG/DL (ref 68–131)
GLUCOSE SERPL-MCNC: 94 MG/DL (ref 70–110)
HBA1C MFR BLD HPLC: 5.3 % (ref 4–5.6)
HCT VFR BLD AUTO: 41.6 % (ref 37–48.5)
HDLC SERPL-MCNC: 35 MG/DL (ref 40–75)
HDLC SERPL: 19.3 % (ref 20–50)
HGB BLD-MCNC: 13.2 G/DL (ref 12–16)
IMM GRANULOCYTES # BLD AUTO: 0.02 K/UL (ref 0–0.04)
IMM GRANULOCYTES NFR BLD AUTO: 0.4 % (ref 0–0.5)
LDLC SERPL CALC-MCNC: 109.2 MG/DL (ref 63–159)
LYMPHOCYTES # BLD AUTO: 1.6 K/UL (ref 1–4.8)
LYMPHOCYTES NFR BLD: 28.9 % (ref 18–48)
MCH RBC QN AUTO: 29.6 PG (ref 27–31)
MCHC RBC AUTO-ENTMCNC: 31.7 G/DL (ref 32–36)
MCV RBC AUTO: 93 FL (ref 82–98)
MONOCYTES # BLD AUTO: 0.3 K/UL (ref 0.3–1)
MONOCYTES NFR BLD: 6.1 % (ref 4–15)
NEUTROPHILS # BLD AUTO: 3.4 K/UL (ref 1.8–7.7)
NEUTROPHILS NFR BLD: 61.5 % (ref 38–73)
NONHDLC SERPL-MCNC: 146 MG/DL
NRBC BLD-RTO: 0 /100 WBC
PLATELET # BLD AUTO: 219 K/UL (ref 150–350)
PMV BLD AUTO: 11.2 FL (ref 9.2–12.9)
POTASSIUM SERPL-SCNC: 4.2 MMOL/L (ref 3.5–5.1)
PROT SERPL-MCNC: 7.3 G/DL (ref 6–8.4)
RBC # BLD AUTO: 4.46 M/UL (ref 4–5.4)
SODIUM SERPL-SCNC: 138 MMOL/L (ref 136–145)
T4 FREE SERPL-MCNC: 1.13 NG/DL (ref 0.71–1.51)
TRIGL SERPL-MCNC: 184 MG/DL (ref 30–150)
TSH SERPL DL<=0.005 MIU/L-ACNC: 0.86 UIU/ML (ref 0.4–4)
WBC # BLD AUTO: 5.57 K/UL (ref 3.9–12.7)

## 2020-12-15 PROCEDURE — 36415 COLL VENOUS BLD VENIPUNCTURE: CPT | Mod: PO

## 2020-12-15 PROCEDURE — 3008F PR BODY MASS INDEX (BMI) DOCUMENTED: ICD-10-PCS | Mod: CPTII,S$GLB,, | Performed by: FAMILY MEDICINE

## 2020-12-15 PROCEDURE — 82306 VITAMIN D 25 HYDROXY: CPT

## 2020-12-15 PROCEDURE — 1126F AMNT PAIN NOTED NONE PRSNT: CPT | Mod: S$GLB,,, | Performed by: FAMILY MEDICINE

## 2020-12-15 PROCEDURE — 93010 ELECTROCARDIOGRAM REPORT: CPT | Mod: S$GLB,,, | Performed by: INTERNAL MEDICINE

## 2020-12-15 PROCEDURE — 93005 EKG 12-LEAD: ICD-10-PCS | Mod: S$GLB,,, | Performed by: FAMILY MEDICINE

## 2020-12-15 PROCEDURE — 85025 COMPLETE CBC W/AUTO DIFF WBC: CPT

## 2020-12-15 PROCEDURE — 99999 PR PBB SHADOW E&M-EST. PATIENT-LVL III: ICD-10-PCS | Mod: PBBFAC,,, | Performed by: FAMILY MEDICINE

## 2020-12-15 PROCEDURE — 93010 EKG 12-LEAD: ICD-10-PCS | Mod: S$GLB,,, | Performed by: INTERNAL MEDICINE

## 2020-12-15 PROCEDURE — 1126F PR PAIN SEVERITY QUANTIFIED, NO PAIN PRESENT: ICD-10-PCS | Mod: S$GLB,,, | Performed by: FAMILY MEDICINE

## 2020-12-15 PROCEDURE — 3008F BODY MASS INDEX DOCD: CPT | Mod: CPTII,S$GLB,, | Performed by: FAMILY MEDICINE

## 2020-12-15 PROCEDURE — 99386 PR PREVENTIVE VISIT,NEW,40-64: ICD-10-PCS | Mod: S$GLB,,, | Performed by: FAMILY MEDICINE

## 2020-12-15 PROCEDURE — 83036 HEMOGLOBIN GLYCOSYLATED A1C: CPT

## 2020-12-15 PROCEDURE — 84439 ASSAY OF FREE THYROXINE: CPT

## 2020-12-15 PROCEDURE — 80053 COMPREHEN METABOLIC PANEL: CPT

## 2020-12-15 PROCEDURE — 93005 ELECTROCARDIOGRAM TRACING: CPT | Mod: S$GLB,,, | Performed by: FAMILY MEDICINE

## 2020-12-15 PROCEDURE — 99999 PR PBB SHADOW E&M-EST. PATIENT-LVL III: CPT | Mod: PBBFAC,,, | Performed by: FAMILY MEDICINE

## 2020-12-15 PROCEDURE — 99386 PREV VISIT NEW AGE 40-64: CPT | Mod: S$GLB,,, | Performed by: FAMILY MEDICINE

## 2020-12-15 PROCEDURE — 84443 ASSAY THYROID STIM HORMONE: CPT

## 2020-12-15 PROCEDURE — 80061 LIPID PANEL: CPT

## 2020-12-15 RX ORDER — AMLODIPINE BESYLATE 5 MG/1
5 TABLET ORAL DAILY
Qty: 30 TABLET | Refills: 1 | Status: SHIPPED | OUTPATIENT
Start: 2020-12-15 | End: 2021-02-12 | Stop reason: SDUPTHER

## 2020-12-15 NOTE — Clinical Note
Lizabeth Rosales Ms. [place] sees Dr. Nafisa Carrion as her gyn.  She reports both mammogram and Pap smear being performed by Dr. Carrion.  Can you please obtain the reports?  Dr. Neri

## 2020-12-15 NOTE — PATIENT INSTRUCTIONS
Established High Blood Pressure    High blood pressure (hypertension) is a chronic disease. Often, healthcare providers dont know what causes it. But it can be caused by certain health conditions and medicines.  If you have high blood pressure, you may not have any symptoms. If you do have symptoms, they may include headache, dizziness, changes in your vision, chest pain, and shortness of breath. But even without symptoms, high blood pressure thats not treated raises your risk for heart attack and stroke. High blood pressure is a serious health risk and shouldnt be ignored.  A blood pressure reading is made up of two numbers: a higher number over a lower number. The top number is the systolic pressure. The bottom number is the diastolic pressure. A normal blood pressure is a systolic pressure of  less than 120 over a diastolic pressure of less than 80. You will see your blood pressure readings written together. For example, a person with a systolic pressure of 188 and a diastolic pressure of 78 will have 118/78 written in the medical record.  High blood pressure is when either the top number is 140 or higher, or the bottom number is 90 or higher. This must be the result when taking your blood pressure a number of times. The blood pressures between normal and high are called prehypertension.  Home care  If you have high blood pressure, you should do what is listed below to lower your blood pressure. If you are taking medicines for high blood pressure, these methods may reduce or end your need for medicines in the future.  · Begin a weight-loss program if you are overweight.  · Cut back on how much salt you get in your diet. Heres how to do this:  ¨ Dont eat foods that have a lot of salt. These include olives, pickles, smoked meats, and salted potato chips.  ¨ Dont add salt to your food at the table.  ¨ Use only small amounts of salt when cooking.  · Start an exercise program. Talk with your healthcare  provider about the type of exercise program that would be best for you. It doesn't have to be hard. Even brisk walking for 20 minutes 3 times a week is a good form of exercise.  · Dont take medicines that stimulate the heart. This includes many over-the-counter cold and sinus decongestant pills and sprays, as well as diet pills. Check the warnings about hypertension on the label. Before buying any over-the-counter medicines or supplements, always ask the pharmacist about the product's potential interaction with your high blood pressure and your high blood pressure medicines.  · Stimulants such as amphetamine or cocaine could be deadly for someone with high blood pressure. Never take these.  · Limit how much caffeine you get in your diet. Switch to caffeine-free products.  · Stop smoking. If you are a long-time smoker, this can be hard. Talk to your healthcare provider about medicines and nicotine replacement options to help you. Also, enroll in a stop-smoking program to make it more likely that you will quit for good.  · Learn how to handle stress. This is an important part of any program to lower blood pressure. Learn about relaxation methods like meditation, yoga, or biofeedback.  · If your provider prescribed medicines, take them exactly as directed. Missing doses may cause your blood pressure get out of control.  · If you miss a dose or doses, check with your healthcare provider or pharmacist about what to do.  · Consider buying an automatic blood pressure machine. Ask your provider for a recommendation. You can get one of these at most pharmacies.     The American Heart Association recommends the following guidelines for home blood pressure monitoring:  · Don't smoke or drink coffee for 30 minutes before taking your blood pressure.  · Go to the bathroom before the test.  · Relax for 5 minutes before taking the measurement.  · Sit with your back supported (don't sit on a couch or soft chair); keep your feet on  the floor uncrossed. Place your arm on a solid flat surface (like a table) with the upper part of the arm at heart level. Place the middle of the cuff directly above the eye of the elbow. Check the monitor's instruction manual for an illustration.  · Take multiple readings. When you measure, take 2 to 3 readings one minute apart and record all of the results.  · Take your blood pressure at the same time every day, or as your healthcare provider recommends.  · Record the date, time, and blood pressure reading.  · Take the record with you to your next medical appointment. If your blood pressure monitor has a built-in memory, simply take the monitor with you to your next appointment.  · Call your provider if you have several high readings. Don't be frightened by a single high blood pressure reading, but if you get several high readings, check in with your healthcare provider.  · Note: When blood pressure reaches a systolic (top number) of 180 or higher OR diastolic (bottom number) of 110 or higher, seek emergency medical treatment.  Follow-up care  You will need to see your healthcare provider regularly. This is to check your blood pressure and to make changes to your medicines. Make a follow-up appointment as directed. Bring the record of your home blood pressure readings to the appointment.  When to seek medical advice  Call your healthcare provider right away if any of these occur:  · Blood pressure reaches a systolic (upper number) of 180 or higher OR a diastolic (bottom number) of 110 or higher  · Chest pain or shortness of breath  · Severe headache  · Throbbing or rushing sound in the ears  · Nosebleed  · Sudden severe pain in your belly (abdomen)  · Extreme drowsiness, confusion, or fainting  · Dizziness or spinning sensation (vertigo)  · Weakness of an arm or leg or one side of the face  · You have problems speaking or seeing   Date Last Reviewed: 12/1/2016  © 2860-4783 [email or web address]. [address] [place] [address]  [place] [address]. All rights reserved. This information is not intended as a substitute for professional medical care. Always follow your healthcare professional's instructions.

## 2020-12-15 NOTE — LETTER
December 15, 2020      Bobbi Crockett PA-C  [address] [address]           [facility]  [address]  [facility] [address]  Phone: [phone]  Fax: [phone]          Patient: Audie Sandoval   MR Number: [id number]   YOB: 1975   Date of Visit: 12/15/2020       Dear Bobbi Crockett:    Thank you for referring Audie Sandoval to me for evaluation. Attached you will find relevant portions of my assessment and plan of care.    If you have questions, please do not hesitate to call me. I look forward to following Audie Sandoval along with you.    Sincerely,    Adonay Neri MD    Enclosure  CC:  No Recipients    If you would like to receive this communication electronically, please contact [email or web address] or [phone] to request more information on [employer] access.    For providers and/or their staff who would like to refer a patient to [facility], please contact us through our one-stop-shop provider referral line, [facility], at [phone].    If you feel you have received this communication in error or would no longer like to receive these types of communications, please e-mail [email or web address]

## 2020-12-15 NOTE — PROGRESS NOTES
Subjective:       Patient ID: Audie Sandoval is a 45 y.o. female.    Chief Complaint: Annual Exam and Establish Care    HPI 45-year-old white female presents to clinic today to establish care.  She reports a past medical history of gestational hypertension but reports the post pregnancy blood pressure returned to being controlled.  She has recently tested positive for COVID and notes that her main symptoms were loss of smell and taste, cough, and substantially elevated blood pressure.  She was started on amlodipine 5 mg daily for treatment of hypertension at her last visit on .  Since that time blood pressure has improved but has not been below 140/90.  She currently denies any symptoms such as headaches, dizziness, chest pain, shortness of breath, or palpitations.  She has seen allergist in the past secondary to chronic cough and was noted to have allergies to wheat products.  Since discontinuing gluten she reports an improvement of her overall symptoms.  She reports a past surgical history of tonsillectomy and .  She reports a family history of her father passing away and having diabetes.  Her mother has hypertension.  Flu vaccine has been discussed but has been declined.  She sees an outside gyn, Dr. Nafisa Carrion, and is up-to-date with mammogram and Pap smear.  Review of Systems   Constitutional: Negative for appetite change, chills, fatigue and fever.   HENT: Negative for nasal congestion, ear pain, hearing loss, postnasal drip, rhinorrhea, sinus pressure/congestion, sore throat and tinnitus.    Eyes: Negative for redness, itching and visual disturbance.   Respiratory: Negative for cough, chest tightness and shortness of breath.    Cardiovascular: Negative for chest pain and palpitations.   Gastrointestinal: Negative for abdominal pain, constipation, diarrhea, nausea and vomiting.   Genitourinary: Negative for decreased urine volume, difficulty urinating, dysuria, frequency, hematuria and  urgency.   Musculoskeletal: Negative for back pain, myalgias, neck pain and neck stiffness.   Integumentary:  Negative for rash.   Neurological: Negative for dizziness, light-headedness and headaches.   Psychiatric/Behavioral: Negative.          Objective:      Physical Exam  Vitals signs and nursing note reviewed.   Constitutional:       General: She is not in acute distress.     Appearance: She is well-developed. She is not diaphoretic.   HENT:      Head: Normocephalic and atraumatic.      Right Ear: External ear normal.      Left Ear: External ear normal.      Nose: Nose normal.      Mouth/Throat:      Pharynx: No oropharyngeal exudate.   Eyes:      General: No scleral icterus.        Right eye: No discharge.         Left eye: No discharge.      Conjunctiva/sclera: Conjunctivae normal.      Pupils: Pupils are equal, round, and reactive to light.   Neck:      Musculoskeletal: Normal range of motion and neck supple.      Thyroid: No thyromegaly.      Vascular: No JVD.      Trachea: No tracheal deviation.   Cardiovascular:      Rate and Rhythm: Normal rate and regular rhythm.      Heart sounds: Normal heart sounds. No murmur. No friction rub. No gallop.    Pulmonary:      Effort: Pulmonary effort is normal. No respiratory distress.      Breath sounds: Normal breath sounds. No stridor. No wheezing or rales.   Abdominal:      General: Bowel sounds are normal. There is no distension.      Palpations: Abdomen is soft. There is no mass.      Tenderness: There is no abdominal tenderness. There is no guarding or rebound.   Musculoskeletal: Normal range of motion.         General: No tenderness.   Lymphadenopathy:      Cervical: No cervical adenopathy.   Skin:     General: Skin is warm and dry.      Coloration: Skin is not pale.      Findings: No erythema or rash.   Neurological:      Mental Status: She is alert and oriented to person, place, and time.   Psychiatric:         Behavior: Behavior normal.         Thought  Content: Thought content normal.         Judgment: Judgment normal.       EKG:  Normal sinus rhythm with minimal voltage criteria for LVH.  Ventricular rate 91 beats per minute.  Assessment:       1. Well adult exam    2. Essential hypertension    3. History of 2019 novel coronavirus disease (COVID-19)    4. Obesity, unspecified classification, unspecified obesity type, unspecified whether serious comorbidity present        Plan:       1.  CBC, CMP, UA, TSH, free T4, fasting lipids, vitamin-D level, and hemoglobin A1c.  2.  Continue amlodipine 5 mg daily and continue to monitor blood pressures daily.  Encourage low-sodium diet and exercise.  3.  EKG now.  4.  Return to clinic as needed or in 1 month for hypertension re-evaluation.

## 2020-12-16 ENCOUNTER — PATIENT OUTREACH (OUTPATIENT)
Dept: ADMINISTRATIVE | Facility: HOSPITAL | Age: 45
End: 2020-12-16

## 2020-12-16 DIAGNOSIS — Z12.31 OTHER SCREENING MAMMOGRAM: ICD-10-CM

## 2020-12-16 NOTE — LETTER
AUTHORIZATION FOR RELEASE OF   CONFIDENTIAL INFORMATION        We are seeing Audie Sandoval, date of birth 1975, in the clinic at [facility]. Adonay Neri MD is the patient's PCP. Audie Sandoval has an outstanding lab/procedure at the time we reviewed her chart. In order to help keep her health information updated, she has authorized us to request the following medical record(s):        (x )  MAMMOGRAM                                      (  )  COLONOSCOPY      ( x )  PAP SMEAR                                          (  )  OUTSIDE LAB RESULTS     (  )  DEXA SCAN                                          (  )  EYE EXAM            (  )  FOOT EXAM                                          (  )  ENTIRE RECORD     (  )  OUTSIDE IMMUNIZATIONS                 (  )  _______________         Please fax records to [facility], Richard K Imsais, MD, [phone]     If you have any questions, please contact Connie at [phone]           Patient Name: Audie Sandoval  : 1975  Patient Phone #: [phone]

## 2021-01-04 ENCOUNTER — PATIENT MESSAGE (OUTPATIENT)
Dept: ADMINISTRATIVE | Facility: HOSPITAL | Age: 46
End: 2021-01-04

## 2021-01-07 ENCOUNTER — PATIENT OUTREACH (OUTPATIENT)
Dept: ADMINISTRATIVE | Facility: HOSPITAL | Age: 46
End: 2021-01-07

## 2021-01-15 ENCOUNTER — PATIENT OUTREACH (OUTPATIENT)
Dept: ADMINISTRATIVE | Facility: HOSPITAL | Age: 46
End: 2021-01-15

## 2021-02-09 ENCOUNTER — PATIENT MESSAGE (OUTPATIENT)
Dept: INTERNAL MEDICINE | Facility: CLINIC | Age: 46
End: 2021-02-09

## 2021-02-12 ENCOUNTER — OFFICE VISIT (OUTPATIENT)
Dept: INTERNAL MEDICINE | Facility: CLINIC | Age: 46
End: 2021-02-12
Payer: COMMERCIAL

## 2021-02-12 DIAGNOSIS — I10 ESSENTIAL HYPERTENSION: ICD-10-CM

## 2021-02-12 PROCEDURE — 99213 OFFICE O/P EST LOW 20 MIN: CPT | Mod: 95,,, | Performed by: FAMILY MEDICINE

## 2021-02-12 PROCEDURE — 99213 PR OFFICE/OUTPT VISIT, EST, LEVL III, 20-29 MIN: ICD-10-PCS | Mod: 95,,, | Performed by: FAMILY MEDICINE

## 2021-02-12 RX ORDER — AMLODIPINE BESYLATE 5 MG/1
5 TABLET ORAL DAILY
Qty: 30 TABLET | Refills: 11 | Status: SHIPPED | OUTPATIENT
Start: 2021-02-12 | End: 2021-06-30

## 2021-02-24 ENCOUNTER — TELEPHONE (OUTPATIENT)
Dept: INTERNAL MEDICINE | Facility: CLINIC | Age: 46
End: 2021-02-24

## 2021-04-15 ENCOUNTER — PATIENT MESSAGE (OUTPATIENT)
Dept: RESEARCH | Facility: HOSPITAL | Age: 46
End: 2021-04-15

## 2021-05-27 ENCOUNTER — PATIENT MESSAGE (OUTPATIENT)
Dept: INTERNAL MEDICINE | Facility: CLINIC | Age: 46
End: 2021-05-27

## 2021-06-30 ENCOUNTER — OFFICE VISIT (OUTPATIENT)
Dept: INTERNAL MEDICINE | Facility: CLINIC | Age: 46
End: 2021-06-30

## 2021-06-30 VITALS
SYSTOLIC BLOOD PRESSURE: 125 MMHG | TEMPERATURE: 99 F | OXYGEN SATURATION: 99 % | HEART RATE: 85 BPM | DIASTOLIC BLOOD PRESSURE: 83 MMHG | HEIGHT: 60 IN | WEIGHT: 182.56 LBS | BODY MASS INDEX: 35.84 KG/M2

## 2021-06-30 DIAGNOSIS — I10 ESSENTIAL HYPERTENSION: ICD-10-CM

## 2021-06-30 PROCEDURE — 99214 OFFICE O/P EST MOD 30 MIN: CPT | Mod: S$PBB,,, | Performed by: FAMILY MEDICINE

## 2021-06-30 PROCEDURE — 99999 PR PBB SHADOW E&M-EST. PATIENT-LVL III: ICD-10-PCS | Mod: PBBFAC,,, | Performed by: FAMILY MEDICINE

## 2021-06-30 PROCEDURE — 99214 PR OFFICE/OUTPT VISIT, EST, LEVL IV, 30-39 MIN: ICD-10-PCS | Mod: S$PBB,,, | Performed by: FAMILY MEDICINE

## 2021-06-30 PROCEDURE — 99999 PR PBB SHADOW E&M-EST. PATIENT-LVL III: CPT | Mod: PBBFAC,,, | Performed by: FAMILY MEDICINE

## 2021-06-30 PROCEDURE — 99213 OFFICE O/P EST LOW 20 MIN: CPT | Mod: PBBFAC,PO | Performed by: FAMILY MEDICINE

## 2021-06-30 RX ORDER — AMLODIPINE BESYLATE 2.5 MG/1
2.5 TABLET ORAL DAILY
Qty: 30 TABLET | Refills: 11 | Status: SHIPPED | OUTPATIENT
Start: 2021-06-30 | End: 2023-03-02

## 2021-10-04 ENCOUNTER — PATIENT MESSAGE (OUTPATIENT)
Dept: ADMINISTRATIVE | Facility: HOSPITAL | Age: 46
End: 2021-10-04

## 2022-01-25 ENCOUNTER — PATIENT MESSAGE (OUTPATIENT)
Dept: ADMINISTRATIVE | Facility: HOSPITAL | Age: 47
End: 2022-01-25

## 2022-02-14 ENCOUNTER — PATIENT MESSAGE (OUTPATIENT)
Dept: RESEARCH | Facility: HOSPITAL | Age: 47
End: 2022-02-14

## 2022-02-16 ENCOUNTER — PATIENT MESSAGE (OUTPATIENT)
Dept: RESEARCH | Facility: HOSPITAL | Age: 47
End: 2022-02-16

## 2022-02-18 DIAGNOSIS — Z12.31 OTHER SCREENING MAMMOGRAM: ICD-10-CM

## 2022-04-29 ENCOUNTER — OFFICE VISIT (OUTPATIENT)
Dept: INTERNAL MEDICINE | Facility: CLINIC | Age: 47
End: 2022-04-29

## 2022-04-29 ENCOUNTER — TELEPHONE (OUTPATIENT)
Dept: INTERNAL MEDICINE | Facility: CLINIC | Age: 47
End: 2022-04-29

## 2022-04-29 VITALS
BODY MASS INDEX: 37.05 KG/M2 | HEART RATE: 94 BPM | DIASTOLIC BLOOD PRESSURE: 102 MMHG | OXYGEN SATURATION: 98 % | RESPIRATION RATE: 18 BRPM | SYSTOLIC BLOOD PRESSURE: 148 MMHG | WEIGHT: 188.69 LBS | TEMPERATURE: 98 F | HEIGHT: 60 IN

## 2022-04-29 DIAGNOSIS — J06.9 VIRAL URI WITH COUGH: ICD-10-CM

## 2022-04-29 DIAGNOSIS — I10 ESSENTIAL HYPERTENSION: ICD-10-CM

## 2022-04-29 DIAGNOSIS — J40 BRONCHITIS WITH ACUTE WHEEZING: Primary | ICD-10-CM

## 2022-04-29 PROCEDURE — 99213 PR OFFICE/OUTPT VISIT, EST, LEVL III, 20-29 MIN: ICD-10-PCS | Mod: S$PBB,,, | Performed by: INTERNAL MEDICINE

## 2022-04-29 PROCEDURE — 99999 PR PBB SHADOW E&M-EST. PATIENT-LVL III: ICD-10-PCS | Mod: PBBFAC,,, | Performed by: INTERNAL MEDICINE

## 2022-04-29 PROCEDURE — 99999 PR PBB SHADOW E&M-EST. PATIENT-LVL III: CPT | Mod: PBBFAC,,, | Performed by: INTERNAL MEDICINE

## 2022-04-29 PROCEDURE — 99213 OFFICE O/P EST LOW 20 MIN: CPT | Mod: S$PBB,,, | Performed by: INTERNAL MEDICINE

## 2022-04-29 PROCEDURE — 99213 OFFICE O/P EST LOW 20 MIN: CPT | Mod: PBBFAC,PO | Performed by: INTERNAL MEDICINE

## 2022-04-29 RX ORDER — FLUTICASONE PROPIONATE 50 MCG
1 SPRAY, SUSPENSION (ML) NASAL DAILY
Qty: 16 ML | Refills: 0 | Status: SHIPPED | OUTPATIENT
Start: 2022-04-29 | End: 2023-03-02

## 2022-04-29 RX ORDER — BENZONATATE 100 MG/1
100 CAPSULE ORAL 3 TIMES DAILY PRN
Qty: 30 CAPSULE | Refills: 0 | Status: SHIPPED | OUTPATIENT
Start: 2022-04-29 | End: 2022-05-09

## 2022-04-29 NOTE — TELEPHONE ENCOUNTER
Called pt to have her change appt to virtual    Pt declined  Wants you to listen to her lungs.  States her lungs feel weird, heavy.    She was sick two weeks ago with fever.  Was not tested for covid.  No immunization record.

## 2022-04-29 NOTE — PROGRESS NOTES
Subjective:     Audie Sandoval is a 46 y.o. female who presents for   Chief Complaint   Patient presents with    Cough     X2 weeks, thinks she had flu two weeks ago, only felt sick for 2 days.  feels better but cough is lingering.  Dry.  Also feels heaviness in left side of chest.  Used mucinex, theraflu and motrin.       HPI     Cough: The patient is here for evaluation of a cough. Onset of symptoms was 2 weeks ago. Symptoms have been unchanged since that time. The cough is dry and harsh and is aggravated by nothing. Associated symptoms include: fever, muscle pain, mild SOB and fatigue initially but symptoms improved. She has a history of bronchitis and has required albuterol inhaler use in the past. She denies contact with anyone positive for Covid but recently close to people with flu. She has been using Mucinex and theraflu. She limits use of albuterol because it causes her to feel jittery. Most symptoma have improved but the cough persists.      Review of Systems   Constitutional: Positive for fatigue (improving) and fever (resolved). Negative for chills and diaphoresis.   HENT: Positive for congestion (chest and nasal), postnasal drip, rhinorrhea and sore throat (scratchy). Negative for ear discharge, ear pain, sinus pressure and sinus pain.    Eyes: Negative for discharge and itching.   Respiratory: Positive for shortness of breath. Negative for cough.    Cardiovascular: Negative for chest pain, palpitations and leg swelling.   Gastrointestinal: Negative for abdominal pain, constipation, diarrhea, nausea and vomiting.   Musculoskeletal: Negative for arthralgias and myalgias.   Skin: Negative for rash and wound.   Neurological: Negative for dizziness, tremors and headaches.          Objective:     Physical Exam  Vitals reviewed.   Constitutional:       General: She is awake. She is not in acute distress.     Appearance: Normal appearance. She is well-developed and well-groomed.   HENT:      Head:  Normocephalic and atraumatic.      Right Ear: Hearing, tympanic membrane, ear canal and external ear normal. Tympanic membrane is not erythematous or bulging.      Left Ear: Hearing, tympanic membrane, ear canal and external ear normal. Tympanic membrane is not erythematous or bulging.      Nose: Nasal tenderness and rhinorrhea present. No congestion. Rhinorrhea is clear.      Right Turbinates: Swollen.      Left Turbinates: Swollen.      Mouth/Throat:      Mouth: Mucous membranes are moist. No oral lesions.      Pharynx: Posterior oropharyngeal erythema present. No oropharyngeal exudate.   Eyes:      General: Lids are normal. Vision grossly intact.   Cardiovascular:      Rate and Rhythm: Normal rate and regular rhythm.      Heart sounds: Normal heart sounds. No murmur heard.  Pulmonary:      Effort: Pulmonary effort is normal. No tachypnea, bradypnea or respiratory distress.      Breath sounds: Normal breath sounds. No decreased air movement. No decreased breath sounds or wheezing.      Comments: Lungs are clear  Abdominal:      General: Bowel sounds are normal. There is no distension.   Musculoskeletal:         General: Normal range of motion.      Cervical back: Normal range of motion and neck supple.      Right lower leg: No edema.      Left lower leg: No edema.   Lymphadenopathy:      Head:      Right side of head: No submandibular, preauricular or posterior auricular adenopathy.      Left side of head: No submandibular, preauricular or posterior auricular adenopathy.      Cervical: No cervical adenopathy.   Skin:     General: Skin is warm and dry.      Findings: No lesion or rash.   Neurological:      Mental Status: She is alert and oriented to person, place, and time.   Psychiatric:         Attention and Perception: Attention normal.         Mood and Affect: Mood normal.         Behavior: Behavior is cooperative.            Assessment:      1. Bronchitis with acute wheezing    2. Viral URI with cough    3.  Essential hypertension           Plan:     1. Bronchitis with acute wheezing  - benzonatate (TESSALON) 100 MG capsule; Take 1 capsule (100 mg total) by mouth 3 (three) times daily as needed for Cough.  Dispense: 30 capsule; Refill: 0  - avoid all decongestants but she may use a plain non-sedating antihistamine (with no decongestant)  - may also use Coricidin for cough,   -continue albuterol as needed    2. Viral URI with cough  - fluticasone propionate (FLONASE) 50 mcg/actuation nasal spray; 1 spray (50 mcg total) by Each Nostril route once daily.  Dispense: 16 mL; Refill: 0    3. HTN  - untreated, she will discuss medications with her PCP    Call if there is no improvement in symptoms    __________________________    Leslie Copeland MD, PharmD  [facility]  American Board of Obesity Medicine diplomate  Office [phone]

## 2022-06-01 ENCOUNTER — PATIENT MESSAGE (OUTPATIENT)
Dept: ADMINISTRATIVE | Facility: HOSPITAL | Age: 47
End: 2022-06-01

## 2022-07-12 ENCOUNTER — PATIENT MESSAGE (OUTPATIENT)
Dept: ADMINISTRATIVE | Facility: HOSPITAL | Age: 47
End: 2022-07-12

## 2022-08-24 ENCOUNTER — PATIENT MESSAGE (OUTPATIENT)
Dept: ADMINISTRATIVE | Facility: HOSPITAL | Age: 47
End: 2022-08-24

## 2022-08-31 DIAGNOSIS — I10 ESSENTIAL HYPERTENSION: ICD-10-CM

## 2022-10-12 ENCOUNTER — PATIENT MESSAGE (OUTPATIENT)
Dept: ADMINISTRATIVE | Facility: HOSPITAL | Age: 47
End: 2022-10-12

## 2022-12-30 RX ORDER — AZITHROMYCIN 250 MG/1
250 TABLET, FILM COATED ORAL DAILY
Qty: 6 TABLET | Refills: 0 | Status: SHIPPED | OUTPATIENT
Start: 2022-12-30 | End: 2023-12-12

## 2022-12-30 RX ORDER — PROMETHAZINE HYDROCHLORIDE AND DEXTROMETHORPHAN HYDROBROMIDE 6.25; 15 MG/5ML; MG/5ML
5 SYRUP ORAL EVERY 4 HOURS PRN
Qty: 118 ML | Refills: 0 | Status: SHIPPED | OUTPATIENT
Start: 2022-12-30 | End: 2023-01-09

## 2023-02-25 ENCOUNTER — HOSPITAL ENCOUNTER (OUTPATIENT)
Facility: HOSPITAL | Age: 48
Discharge: HOME | End: 2023-02-25
Attending: EMERGENCY MEDICINE | Admitting: INTERNAL MEDICINE

## 2023-02-25 VITALS
HEIGHT: 60 IN | TEMPERATURE: 98 F | HEART RATE: 107 BPM | RESPIRATION RATE: 21 BRPM | BODY MASS INDEX: 31.41 KG/M2 | DIASTOLIC BLOOD PRESSURE: 96 MMHG | OXYGEN SATURATION: 99 % | WEIGHT: 160 LBS | SYSTOLIC BLOOD PRESSURE: 143 MMHG

## 2023-02-25 DIAGNOSIS — F41.1 ANXIETY IN ACUTE STRESS REACTION: ICD-10-CM

## 2023-02-25 DIAGNOSIS — D72.829 LEUKOCYTOSIS, UNSPECIFIED TYPE: Primary | ICD-10-CM

## 2023-02-25 DIAGNOSIS — R07.9 CHEST PAIN: ICD-10-CM

## 2023-02-25 DIAGNOSIS — F43.0 ANXIETY IN ACUTE STRESS REACTION: ICD-10-CM

## 2023-02-25 DIAGNOSIS — R00.0 TACHYCARDIA: ICD-10-CM

## 2023-02-25 LAB
ALBUMIN SERPL BCP-MCNC: 4.4 G/DL (ref 3.5–5.2)
ALP SERPL-CCNC: 83 U/L (ref 55–135)
ALT SERPL W/O P-5'-P-CCNC: 26 U/L (ref 10–44)
ANION GAP SERPL CALC-SCNC: 10 MMOL/L (ref 8–16)
ANISOCYTOSIS BLD QL SMEAR: SLIGHT
AST SERPL-CCNC: 31 U/L (ref 10–40)
BASO STIPL BLD QL SMEAR: ABNORMAL
BASOPHILS NFR BLD: 4.5 % (ref 0–1.9)
BILIRUB SERPL-MCNC: 0.4 MG/DL (ref 0.1–1)
BUN SERPL-MCNC: 12 MG/DL (ref 6–20)
CALCIUM SERPL-MCNC: 10.2 MG/DL (ref 8.7–10.5)
CHLORIDE SERPL-SCNC: 105 MMOL/L (ref 95–110)
CO2 SERPL-SCNC: 22 MMOL/L (ref 23–29)
CREAT SERPL-MCNC: 0.8 MG/DL (ref 0.5–1.4)
DACRYOCYTES BLD QL SMEAR: ABNORMAL
DIFFERENTIAL METHOD: ABNORMAL
EOSINOPHIL NFR BLD: 0.5 % (ref 0–8)
ERYTHROCYTE [DISTWIDTH] IN BLOOD BY AUTOMATED COUNT: 17.2 % (ref 11.5–14.5)
EST. GFR  (NO RACE VARIABLE): >60 ML/MIN/1.73 M^2
GLUCOSE SERPL-MCNC: 89 MG/DL (ref 70–110)
HAPTOGLOB SERPL-MCNC: 155 MG/DL (ref 30–250)
HCT VFR BLD AUTO: 36.7 % (ref 37–48.5)
HCV AB SERPL QL IA: NORMAL
HGB BLD-MCNC: 11.1 G/DL (ref 12–16)
HIV 1+2 AB+HIV1 P24 AG SERPL QL IA: NORMAL
IMM GRANULOCYTES # BLD AUTO: ABNORMAL K/UL (ref 0–0.04)
IMM GRANULOCYTES NFR BLD AUTO: ABNORMAL % (ref 0–0.5)
INR PPP: 1 (ref 0.8–1.2)
LACTATE SERPL-SCNC: 0.9 MMOL/L (ref 0.5–2.2)
LDH SERPL L TO P-CCNC: 817 U/L (ref 110–260)
LYMPHOCYTES NFR BLD: 5.5 % (ref 18–48)
MAGNESIUM SERPL-MCNC: 2.2 MG/DL (ref 1.6–2.6)
MCH RBC QN AUTO: 29.2 PG (ref 27–31)
MCHC RBC AUTO-ENTMCNC: 30.2 G/DL (ref 32–36)
MCV RBC AUTO: 97 FL (ref 82–98)
METAMYELOCYTES NFR BLD MANUAL: 4.5 %
MONOCYTES NFR BLD: 3 % (ref 4–15)
MYELOCYTES NFR BLD MANUAL: 6 %
NEUTROPHILS NFR BLD: 58.5 % (ref 38–73)
NEUTS BAND NFR BLD MANUAL: 13 %
NRBC BLD-RTO: 1 /100 WBC
OVALOCYTES BLD QL SMEAR: ABNORMAL
PATH REV BLD -IMP: NORMAL
PHOSPHATE SERPL-MCNC: 3.5 MG/DL (ref 2.7–4.5)
PLATELET # BLD AUTO: 165 K/UL (ref 150–450)
PMV BLD AUTO: 11.5 FL (ref 9.2–12.9)
POIKILOCYTOSIS BLD QL SMEAR: SLIGHT
POLYCHROMASIA BLD QL SMEAR: ABNORMAL
POTASSIUM SERPL-SCNC: 3.9 MMOL/L (ref 3.5–5.1)
PROMYELOCYTES NFR BLD MANUAL: 2 %
PROT SERPL-MCNC: 8.1 G/DL (ref 6–8.4)
PROTHROMBIN TIME: 10.9 SEC (ref 9–12.5)
RBC # BLD AUTO: 3.8 M/UL (ref 4–5.4)
SODIUM SERPL-SCNC: 137 MMOL/L (ref 136–145)
URATE SERPL-MCNC: 6.9 MG/DL (ref 2.4–5.7)
WBC # BLD AUTO: 129.6 K/UL (ref 3.9–12.7)
WBC OTHER NFR BLD MANUAL: 3 %

## 2023-02-25 PROCEDURE — 93005 ELECTROCARDIOGRAM TRACING: CPT

## 2023-02-25 PROCEDURE — 99285 EMERGENCY DEPT VISIT HI MDM: CPT | Mod: 25

## 2023-02-25 PROCEDURE — 85007 BL SMEAR W/DIFF WBC COUNT: CPT | Performed by: EMERGENCY MEDICINE

## 2023-02-25 PROCEDURE — 84100 ASSAY OF PHOSPHORUS: CPT | Performed by: EMERGENCY MEDICINE

## 2023-02-25 PROCEDURE — 63600175 PHARM REV CODE 636 W HCPCS: Performed by: EMERGENCY MEDICINE

## 2023-02-25 PROCEDURE — 84550 ASSAY OF BLOOD/URIC ACID: CPT | Performed by: EMERGENCY MEDICINE

## 2023-02-25 PROCEDURE — G0378 HOSPITAL OBSERVATION PER HR: HCPCS

## 2023-02-25 PROCEDURE — 99285 PR EMERGENCY DEPT VISIT,LEVEL V: ICD-10-PCS | Mod: ,,, | Performed by: EMERGENCY MEDICINE

## 2023-02-25 PROCEDURE — 99285 EMERGENCY DEPT VISIT HI MDM: CPT | Mod: ,,, | Performed by: EMERGENCY MEDICINE

## 2023-02-25 PROCEDURE — 93010 EKG 12-LEAD: ICD-10-PCS | Mod: ,,, | Performed by: INTERNAL MEDICINE

## 2023-02-25 PROCEDURE — 85060 PATHOLOGIST REVIEW: ICD-10-PCS | Mod: ,,, | Performed by: PATHOLOGY

## 2023-02-25 PROCEDURE — 80053 COMPREHEN METABOLIC PANEL: CPT | Performed by: EMERGENCY MEDICINE

## 2023-02-25 PROCEDURE — 85610 PROTHROMBIN TIME: CPT | Performed by: EMERGENCY MEDICINE

## 2023-02-25 PROCEDURE — 85060 BLOOD SMEAR INTERPRETATION: CPT | Mod: ,,, | Performed by: PATHOLOGY

## 2023-02-25 PROCEDURE — 83615 LACTATE (LD) (LDH) ENZYME: CPT | Performed by: PHYSICIAN ASSISTANT

## 2023-02-25 PROCEDURE — 83010 ASSAY OF HAPTOGLOBIN QUANT: CPT | Performed by: PHYSICIAN ASSISTANT

## 2023-02-25 PROCEDURE — 93010 ELECTROCARDIOGRAM REPORT: CPT | Mod: ,,, | Performed by: INTERNAL MEDICINE

## 2023-02-25 PROCEDURE — 96360 HYDRATION IV INFUSION INIT: CPT

## 2023-02-25 PROCEDURE — 87040 BLOOD CULTURE FOR BACTERIA: CPT | Performed by: EMERGENCY MEDICINE

## 2023-02-25 PROCEDURE — 83605 ASSAY OF LACTIC ACID: CPT | Performed by: EMERGENCY MEDICINE

## 2023-02-25 PROCEDURE — 83735 ASSAY OF MAGNESIUM: CPT | Performed by: EMERGENCY MEDICINE

## 2023-02-25 PROCEDURE — 87389 HIV-1 AG W/HIV-1&-2 AB AG IA: CPT | Performed by: PHYSICIAN ASSISTANT

## 2023-02-25 PROCEDURE — 85027 COMPLETE CBC AUTOMATED: CPT | Performed by: EMERGENCY MEDICINE

## 2023-02-25 PROCEDURE — 86803 HEPATITIS C AB TEST: CPT | Performed by: PHYSICIAN ASSISTANT

## 2023-02-25 PROCEDURE — 25000003 PHARM REV CODE 250: Performed by: EMERGENCY MEDICINE

## 2023-02-25 RX ORDER — SODIUM,POTASSIUM PHOSPHATES 280-250MG
2 POWDER IN PACKET (EA) ORAL
Status: DISCONTINUED | OUTPATIENT
Start: 2023-02-25 | End: 2023-02-25 | Stop reason: HOSPADM

## 2023-02-25 RX ORDER — TALC
6 POWDER (GRAM) TOPICAL NIGHTLY PRN
Status: DISCONTINUED | OUTPATIENT
Start: 2023-02-25 | End: 2023-02-25 | Stop reason: HOSPADM

## 2023-02-25 RX ORDER — AMOXICILLIN 250 MG
1 CAPSULE ORAL 2 TIMES DAILY
Status: DISCONTINUED | OUTPATIENT
Start: 2023-02-25 | End: 2023-02-25 | Stop reason: HOSPADM

## 2023-02-25 RX ORDER — ACETAMINOPHEN 325 MG/1
650 TABLET ORAL EVERY 4 HOURS PRN
Status: DISCONTINUED | OUTPATIENT
Start: 2023-02-25 | End: 2023-02-25 | Stop reason: HOSPADM

## 2023-02-25 RX ORDER — LORAZEPAM 0.5 MG/1
0.5 TABLET ORAL
Status: COMPLETED | OUTPATIENT
Start: 2023-02-25 | End: 2023-02-25

## 2023-02-25 RX ORDER — MAG HYDROX/ALUMINUM HYD/SIMETH 200-200-20
30 SUSPENSION, ORAL (FINAL DOSE FORM) ORAL 4 TIMES DAILY PRN
Status: DISCONTINUED | OUTPATIENT
Start: 2023-02-25 | End: 2023-02-25 | Stop reason: HOSPADM

## 2023-02-25 RX ORDER — SIMETHICONE 80 MG
1 TABLET,CHEWABLE ORAL 4 TIMES DAILY PRN
Status: DISCONTINUED | OUTPATIENT
Start: 2023-02-25 | End: 2023-02-25 | Stop reason: HOSPADM

## 2023-02-25 RX ORDER — ONDANSETRON 2 MG/ML
4 INJECTION INTRAMUSCULAR; INTRAVENOUS EVERY 8 HOURS PRN
Status: DISCONTINUED | OUTPATIENT
Start: 2023-02-25 | End: 2023-02-25 | Stop reason: HOSPADM

## 2023-02-25 RX ORDER — SODIUM CHLORIDE 0.9 % (FLUSH) 0.9 %
10 SYRINGE (ML) INJECTION EVERY 12 HOURS PRN
Status: DISCONTINUED | OUTPATIENT
Start: 2023-02-25 | End: 2023-02-25 | Stop reason: HOSPADM

## 2023-02-25 RX ORDER — NALOXONE HCL 0.4 MG/ML
0.02 VIAL (ML) INJECTION
Status: DISCONTINUED | OUTPATIENT
Start: 2023-02-25 | End: 2023-02-25 | Stop reason: HOSPADM

## 2023-02-25 RX ORDER — LANOLIN ALCOHOL/MO/W.PET/CERES
800 CREAM (GRAM) TOPICAL
Status: DISCONTINUED | OUTPATIENT
Start: 2023-02-25 | End: 2023-02-25 | Stop reason: HOSPADM

## 2023-02-25 RX ORDER — AMLODIPINE BESYLATE 5 MG/1
5 TABLET ORAL
Status: COMPLETED | OUTPATIENT
Start: 2023-02-25 | End: 2023-02-25

## 2023-02-25 RX ORDER — DEXTROSE 40 %
15 GEL (GRAM) ORAL
Status: DISCONTINUED | OUTPATIENT
Start: 2023-02-25 | End: 2023-02-25 | Stop reason: HOSPADM

## 2023-02-25 RX ORDER — GLUCAGON 1 MG
1 KIT INJECTION
Status: DISCONTINUED | OUTPATIENT
Start: 2023-02-25 | End: 2023-02-25 | Stop reason: HOSPADM

## 2023-02-25 RX ORDER — ENOXAPARIN SODIUM 100 MG/ML
40 INJECTION SUBCUTANEOUS EVERY 24 HOURS
Status: DISCONTINUED | OUTPATIENT
Start: 2023-02-25 | End: 2023-02-25 | Stop reason: HOSPADM

## 2023-02-25 RX ORDER — DEXTROSE 40 %
30 GEL (GRAM) ORAL
Status: DISCONTINUED | OUTPATIENT
Start: 2023-02-25 | End: 2023-02-25 | Stop reason: HOSPADM

## 2023-02-25 RX ADMIN — AMLODIPINE BESYLATE 5 MG: 5 TABLET ORAL at 03:02

## 2023-02-25 RX ADMIN — LORAZEPAM 0.5 MG: 0.5 TABLET ORAL at 03:02

## 2023-02-25 RX ADMIN — SODIUM CHLORIDE, POTASSIUM CHLORIDE, SODIUM LACTATE AND CALCIUM CHLORIDE 1000 ML: 600; 310; 30; 20 INJECTION, SOLUTION INTRAVENOUS at 12:02

## 2023-02-25 RX ADMIN — LORAZEPAM 0.5 MG: 0.5 TABLET ORAL at 12:02

## 2023-02-25 NOTE — Clinical Note
Diagnosis: Leukocytosis, unspecified type [1431600]   Future Attending Provider: MOIRA KEARNS [[id number]]   Is the patient being sent to ED Observation?: No   Admitting Provider:: MOIRA KEARNS [[id number]]   Bed request comments: neutropenic precautions

## 2023-02-25 NOTE — ED TRIAGE NOTES
"Got routine blood work done 8 days ago. Says her CBC is "wack." States elevated WBC and was told to come to ER to be evaluated.   "

## 2023-02-25 NOTE — ED PROVIDER NOTES
Encounter Date: 2023       History     Chief Complaint   Patient presents with    Abnormal Lab     Wbc 122k     47-year-old woman without significant past medical history presents to the ED for evaluation of concerns related to recent outpatient lab results.  Specifically, the patient raises concerns for CBC findings noted 8 days ago which identified a total white blood cell count of 122 with a high percentage of metamyelocytes with concerns of potential hematologic malignancy.  She denies any recent clinical issues, specifically she denies any recent fevers, chills, nausea, vomiting, syncope, chest pain, shortness of breath, skin changes, rashes, or easy bruisability.    Review of patient's allergies indicates:   Allergen Reactions    Codeine     Wheat containing prod      Past Medical History:   Diagnosis Date    Allergy     Gestational hypertension      Past Surgical History:   Procedure Laterality Date     SECTION      TONSILLECTOMY       Family History   Problem Relation Age of Onset    Hypertension Mother     Arthritis Mother         DJD spine    Abnormal EKG Father     Diabetes Father      Social History     Tobacco Use    Smoking status: Never    Smokeless tobacco: Never   Substance Use Topics    Alcohol use: Yes     Comment: Rare    Drug use: Never     Review of Systems   Constitutional:  Negative for chills, fatigue and fever.   HENT:  Negative for sore throat.    Respiratory:  Negative for cough and shortness of breath.    Cardiovascular:  Negative for chest pain.   Gastrointestinal:  Negative for abdominal pain, diarrhea, nausea and vomiting.   Genitourinary:  Negative for dysuria and flank pain.   Musculoskeletal:  Negative for back pain and neck pain.   Skin:  Negative for pallor, rash and wound.   Neurological:  Negative for seizures and syncope.   Psychiatric/Behavioral:  Negative for confusion and decreased concentration.      Physical Exam     Initial Vitals [23 1145]   BP Pulse  Resp Temp SpO2   (!) 185/121 (!) 128 20 98.4 °F (36.9 °C) 98 %      MAP       --         Physical Exam    Vitals reviewed.  Constitutional:   47-year-old  woman, mildly anxious, no acute distress noted   HENT:   Head: Normocephalic and atraumatic.   Moderately desiccated mucous membranes without evidence of acute intraoral injury   Eyes: EOM are normal. Pupils are equal, round, and reactive to light.   Neck: No tracheal deviation present.   Cardiovascular:            Tachycardia noted with intact distal pulses   Pulmonary/Chest: Breath sounds normal. No stridor. No respiratory distress.   Abdominal: Abdomen is soft. There is no abdominal tenderness. There is no rebound.   Musculoskeletal:         General: No edema. Normal range of motion.     Neurological: She is alert and oriented to person, place, and time. GCS score is 15. GCS eye subscore is 4. GCS verbal subscore is 5. GCS motor subscore is 6.   Skin: Skin is warm and dry. No rash noted. No erythema. No pallor.   Psychiatric:   Moderately anxious appropriate for setting, cooperative with exam       ED Course   Procedures  Labs Reviewed   CBC W/ AUTO DIFFERENTIAL - Abnormal; Notable for the following components:       Result Value    .60 (*)     RBC 3.80 (*)     Hemoglobin 11.1 (*)     Hematocrit 36.7 (*)     MCHC 30.2 (*)     RDW 17.2 (*)     nRBC 1 (*)     Lymph % 5.5 (*)     Mono % 3.0 (*)     Basophil % 4.5 (*)     All other components within normal limits    Narrative:     Release to patient->Immediate     WBC critical result(s) called and verbal readback obtained from   Dr. Konrad Ngo. by [facility] 02/25/2023 13:38   COMPREHENSIVE METABOLIC PANEL - Abnormal; Notable for the following components:    CO2 22 (*)     All other components within normal limits    Narrative:     Release to patient->Immediate   URIC ACID - Abnormal; Notable for the following components:    Uric Acid 6.9 (*)     All other components within normal limits   LACTATE  DEHYDROGENASE - Abnormal; Notable for the following components:     (*)     All other components within normal limits    Narrative:     Release to patient->Immediate    ADD ON REVH [id number] PER DR. JYOTI MANZO  02/25/2023  14:38     add on haptoglobin per md jyoti grady  02/25/2023  15:02     ADD ON LACTATE DEHYDROGENASE PER MD JYOTI GRADY  02/25/2023    15:07    CULTURE, BLOOD   CULTURE, BLOOD   HIV 1 / 2 ANTIBODY    Narrative:     Release to patient->Immediate   HEPATITIS C ANTIBODY    Narrative:     Release to patient->Immediate   PROTIME-INR    Narrative:     Release to patient->Immediate   LACTIC ACID, PLASMA    Narrative:     Release to patient->Immediate   MAGNESIUM   PHOSPHORUS   HAPTOGLOBIN   LACTATE DEHYDROGENASE   PATHOLOGIST INTERPRETATION DIFFERENTIAL   URIC ACID   PATHOLOGIST INTERPRETATION DIFFERENTIAL    Narrative:     Release to patient->Immediate    ADD ON REVH [id number] PER DR. JYOTI MANZO  02/25/2023  14:38    HAPTOGLOBIN    Narrative:     Release to patient->Immediate    ADD ON REVH [id number] PER DR. JYOTI MANZO  02/25/2023  14:38     add on haptoglobin per md jyoti grady  02/25/2023  15:02     ADD ON LACTATE DEHYDROGENASE PER MD JYOTI GRADY  02/25/2023    15:07    SARS-COV-2 RNA AMPLIFICATION, QUAL     EKG Readings: (Independently Interpreted)   Rhythm: Sinus Tachycardia. Heart Rate: 114. Ectopy: No Ectopy. Conduction: Normal. ST Segments: Normal ST Segments. Axis: Normal.     Imaging Results              X-Ray Chest AP Portable (Final result)  Result time 02/25/23 14:17:01      Final result by Dre Carreon MD (02/25/23 14:17:01)                   Impression:      No acute abnormality.      Electronically signed by: Dre Carreon MD  Date:    02/25/2023  Time:    14:17               Narrative:    EXAMINATION:  XR CHEST AP PORTABLE    CLINICAL HISTORY:  Chest Pain;    TECHNIQUE:  Single frontal view of the chest was  performed.    COMPARISON:  12/8/2020    FINDINGS:  The lungs are clear, with normal appearance of pulmonary vasculature and no pleural effusion or pneumothorax.    The cardiac silhouette is normal in size. The hilar and mediastinal contours are unremarkable.    Bones are intact.                                       Medications   sodium chloride 0.9% flush 10 mL (has no administration in time range)   melatonin tablet 6 mg (has no administration in time range)   ondansetron injection 4 mg (has no administration in time range)   senna-docusate 8.6-50 mg per tablet 1 tablet (has no administration in time range)   simethicone chewable tablet 80 mg (has no administration in time range)   aluminum-magnesium hydroxide-simethicone 200-200-20 mg/5 mL suspension 30 mL (has no administration in time range)   acetaminophen tablet 650 mg (has no administration in time range)   naloxone 0.4 mg/mL injection 0.02 mg (has no administration in time range)   potassium bicarbonate disintegrating tablet 50 mEq (has no administration in time range)   potassium bicarbonate disintegrating tablet 35 mEq (has no administration in time range)   potassium bicarbonate disintegrating tablet 60 mEq (has no administration in time range)   magnesium oxide tablet 800 mg (has no administration in time range)   magnesium oxide tablet 800 mg (has no administration in time range)   potassium, sodium phosphates 280-160-250 mg packet 2 packet (has no administration in time range)   potassium, sodium phosphates 280-160-250 mg packet 2 packet (has no administration in time range)   glucagon (human recombinant) injection 1 mg (has no administration in time range)   dextrose 10% bolus 125 mL 125 mL (has no administration in time range)   dextrose 10% bolus 250 mL 250 mL (has no administration in time range)   dextrose 40 % gel 15,000 mg (has no administration in time range)   dextrose 40 % gel 30,000 mg (has no administration in time range)   enoxaparin  injection 40 mg (has no administration in time range)   lactated ringers bolus 1,000 mL (0 mLs Intravenous Stopped 2/25/23 1351)   LORazepam tablet 0.5 mg (0.5 mg Oral Given 2/25/23 1228)   amLODIPine tablet 5 mg (5 mg Oral Given 2/25/23 1518)   LORazepam tablet 0.5 mg (0.5 mg Oral Given 2/25/23 1518)     Medical Decision Making:   History:   Old Medical Records: I decided to obtain old medical records.  Differential Diagnosis:   Lab error, AML, lethal arrhythmia, occult malignancy  Clinical Tests:   Lab Tests: Ordered and Reviewed  Radiological Study: Ordered and Reviewed          Attending Attestation:             Attending ED Notes:   Laboratory evaluation redemonstrates the abnormalities noted on outpatient study with a WBC of 129 with associated metamyelocytes; however there is not evidence of significant associated anemia or thrombocytopenia.  Chest x-ray does not reveal any evidence of lymphadenopathy or mass or additional acute injury.  Metabolic profile is largely unremarkable with only a minimally diminished bicarbonate without additional solid organ dysfunction or electrolyte derangement.  Blood cultures are pending at the time of disposition.  Lactic acid is noted to be within normal limits.  EKG reveals evidence of a sinus tachycardia without ectopy or injury pattern.  She has undergone an urgent evaluation by hematology who has counseled the patient at bedside, and reports that her current findings are consistent with a chronic myeloid illness at this time without additional comorbid injuries that warrant inpatient therapy at this time.  These findings and recommendations have been discussed with the patient and spouse, and are comfortable with disposition to home and outpatient follow-up in this setting.  Patient's tachycardia has improved with anxiolytics and hydration, and she is tolerating oral intake at the time of disposition.  She will be discharged home in stable condition with a referral to  Hematology/BMT for outpatient definitive management counseling of her presenting complaint today.  An urgent referral has been submitted by me.                 Clinical Impression:   Final diagnoses:  [D72.829] Leukocytosis, unspecified type (Primary)  [R00.0] Tachycardia  [F41.1, F43.0] Anxiety in acute stress reaction        ED Disposition Condition    Discharge Fair          ED Prescriptions    None       Follow-up Information       Follow up With Specialties Details Why Contact Info Additional Information    [facility] [[email or web address]] - Bone Marrow Transplant Hematology and Oncology  follow-up in clinic on March 2. You will be contacted by phone to schedule time. [address] [facility] [address] [phone] Please park in the [facility] lot on the [address] side. Check in on the 5th floor.    [facility] - Emergency Dept Emergency Medicine  As needed, If symptoms worsen [address] [place] [address] [phone]     Thad Henriquez MD Hematology and Oncology   [address] CONTACT TYPE  [facility] [address]  [phone]                Konrad Ngo MD  02/25/23 8732

## 2023-02-27 LAB — PATH REV BLD -IMP: NORMAL

## 2023-03-02 ENCOUNTER — DOCUMENTATION ONLY (OUTPATIENT)
Dept: ONCOLOGY | Facility: HOSPITAL | Age: 48
End: 2023-03-02

## 2023-03-02 ENCOUNTER — OFFICE VISIT (OUTPATIENT)
Dept: HEMATOLOGY/ONCOLOGY | Facility: CLINIC | Age: 48
End: 2023-03-02

## 2023-03-02 VITALS
RESPIRATION RATE: 16 BRPM | OXYGEN SATURATION: 98 % | HEIGHT: 60 IN | DIASTOLIC BLOOD PRESSURE: 99 MMHG | SYSTOLIC BLOOD PRESSURE: 185 MMHG | HEART RATE: 113 BPM | WEIGHT: 156.5 LBS | BODY MASS INDEX: 30.73 KG/M2 | TEMPERATURE: 98 F

## 2023-03-02 DIAGNOSIS — C92.10 CML (CHRONIC MYELOCYTIC LEUKEMIA): Primary | ICD-10-CM

## 2023-03-02 DIAGNOSIS — E79.0 ELEVATED BLOOD URIC ACID LEVEL: ICD-10-CM

## 2023-03-02 LAB
BACTERIA BLD CULT: NORMAL
BACTERIA BLD CULT: NORMAL

## 2023-03-02 PROCEDURE — 99999 PR PBB SHADOW E&M-EST. PATIENT-LVL III: ICD-10-PCS | Mod: PBBFAC,,, | Performed by: STUDENT IN AN ORGANIZED HEALTH CARE EDUCATION/TRAINING PROGRAM

## 2023-03-02 PROCEDURE — 99215 OFFICE O/P EST HI 40 MIN: CPT | Mod: S$PBB,,, | Performed by: STUDENT IN AN ORGANIZED HEALTH CARE EDUCATION/TRAINING PROGRAM

## 2023-03-02 PROCEDURE — 99213 OFFICE O/P EST LOW 20 MIN: CPT | Mod: PBBFAC | Performed by: STUDENT IN AN ORGANIZED HEALTH CARE EDUCATION/TRAINING PROGRAM

## 2023-03-02 PROCEDURE — 99999 PR PBB SHADOW E&M-EST. PATIENT-LVL III: CPT | Mod: PBBFAC,,, | Performed by: STUDENT IN AN ORGANIZED HEALTH CARE EDUCATION/TRAINING PROGRAM

## 2023-03-02 PROCEDURE — 99215 PR OFFICE/OUTPT VISIT, EST, LEVL V, 40-54 MIN: ICD-10-PCS | Mod: S$PBB,,, | Performed by: STUDENT IN AN ORGANIZED HEALTH CARE EDUCATION/TRAINING PROGRAM

## 2023-03-02 RX ORDER — ACYCLOVIR 400 MG/1
400 TABLET ORAL 2 TIMES DAILY
COMMUNITY
Start: 2022-11-13

## 2023-03-02 RX ORDER — ALLOPURINOL 300 MG/1
300 TABLET ORAL DAILY
Qty: 30 TABLET | Refills: 3 | Status: SHIPPED | OUTPATIENT
Start: 2023-03-02 | End: 2023-04-01

## 2023-03-02 NOTE — PROGRESS NOTES
[facility] received a message from pt's Oncologist, Joseline Coleman MD reporting that pt has [employer] healthcare coverage. LCSW met with pt and pt's  in room floor of the BMT clinic. Pt provided LCSW with [employer] card. Front and back of card was scanned and uploaded to [employer] email. LCSW referred pt to Delia Reed with [facility] financial assistance and [employer]. LCSW informed, Brandt Oviedo MD, of progress via [employer] secure chat. LCSW will follow and assist.        Ailin Garcia, [facility]  Oncology Social Worker License # [id number]  [facility]  [email or web address]  P. [phone]; F. [phone]

## 2023-03-02 NOTE — PROGRESS NOTES
BMT Chart Routing      Follow up with physician . Follow up with me on 3/16 (already scheduled)   Follow up with MICHELL    Provider visit type    Infusion scheduling note    Injection scheduling note    Labs CBC, CMP, LDH and uric acid   Lab interval:  BCR-ABL, hep B studies (already scheduled and linked)   Imaging    Pharmacy appointment    Other referrals       HEMATOLOGY ONCOLOGY FELLOW CLINIC    IDENTIFYING STATEMENT   Audie Sandoval (Audie) is a 47 y.o. female with history of hypertension who presents to clinic for further evaluation and management of her leukocytosis    HISTORY OF PRESENT ILLNESS:    Patient initially alerted to her elevated WBC count when she got lab work through [facility]. After having blood drawn she received her results about one week later which showed WBC count of 120k. She discussed this with a friend of her's who is a nurse who encouraged her to be evaluated in emergency department. She presented to [facility] main [facility] where hematology team was alerted by ED physician out of concern for acute leukemia. Hematology team evaluated patient's ED lab work. WBC count of 129 and mild anemia with Hg of 11 but no thrombocytopenia. CMP grossly wnl. Elevated LDH to 817, and elevated uric acid to 6.9. Peripheral smear reviewed with findings of arked leukocytosis with a granulocytic predominance.   Granulocytes are left shifted to the myelocyte stage.  Occasional blasts and promyelocytes are present, estimated less than 5%. Basophils are increased. Patient evaluated by hematology at bedside. Physical exam unremarkable and patient denies major systemic symptoms. Likely diagnosis of CML (chronic phase) was made with discharge directly from ED to clinic on 3/2 for further work up and management.   --------------------------------------------------------------------------------------------------------------------------------------------------------------------------------------------------  At  time of follow up appointment, patient continues to feel well. She denies any systemic symptoms and has good performance status. Of note, patient is self pay. Will have her meet with social work/financial.     Past Medical History:   Diagnosis Date    Allergy     Gestational hypertension        Family History   Problem Relation Age of Onset    Hypertension Mother     Arthritis Mother         DJD spine    Abnormal EKG Father     Diabetes Father        Social History     Socioeconomic History    Marital status:    Tobacco Use    Smoking status: Never    Smokeless tobacco: Never   Substance and Sexual Activity    Alcohol use: Yes     Comment: Rare    Drug use: Never    Sexual activity: Yes     Partners: Male         MEDICATIONS:     Current Outpatient Medications on File Prior to Visit   Medication Sig Dispense Refill    ALBUTEROL, REFILL, INHL       azithromycin (Z-NOHEMY) 250 MG tablet Take 1 tablet (250 mg total) by mouth once daily. Take first 2 tablets together, then 1 every day until finished. 6 tablet 0    [DISCONTINUED] fluticasone propionate (FLONASE) 50 mcg/actuation nasal spray 1 spray (50 mcg total) by Each Nostril route once daily. 16 mL 0    [DISCONTINUED] semaglutide (OZEMPIC) 0.25 mg or 0.5 mg(2 mg/1.5 mL) pen injector Inject into the skin every 7 days.      acyclovir (ZOVIRAX) 400 MG tablet Take 400 mg by mouth 2 (two) times daily.      [DISCONTINUED] amLODIPine (NORVASC) 2.5 MG tablet Take 1 tablet (2.5 mg total) by mouth once daily. (Patient not taking: Reported on 4/29/2022) 30 tablet 11     No current facility-administered medications on file prior to visit.       ALLERGIES:   Review of patient's allergies indicates:   Allergen Reactions    Codeine     Wheat containing prod         ROS:       Review of Systems   Constitutional:  Negative for appetite change, chills, fatigue, fever and unexpected weight change.   HENT:   Negative for sore throat and trouble swallowing.     Respiratory:  Negative for cough, shortness of breath and wheezing.    Cardiovascular:  Negative for leg swelling and palpitations.   Gastrointestinal:  Negative for abdominal pain, diarrhea and nausea.   Genitourinary:  Negative for dysuria and frequency.    Musculoskeletal:  Negative for arthralgias and myalgias.   Skin:  Negative for rash.   Neurological:  Negative for dizziness, headaches and light-headedness.   Hematological:  Negative for adenopathy. Does not bruise/bleed easily.   Psychiatric/Behavioral:  Negative for confusion and decreased concentration. The patient is nervous/anxious.      PHYSICAL EXAM:  Vitals:    03/02/23 0831   BP: (!) 185/99   Pulse: (!) 113   Resp: 16   Temp: 98.1 °F (36.7 °C)   SpO2: 98%   Weight: 71 kg (156 lb 8.4 oz)   Height: 5' (1.524 m)   PainSc: 0-No pain       Physical Exam  Vitals reviewed.   Constitutional:       General: She is not in acute distress.     Appearance: She is well-developed. She is not toxic-appearing.   HENT:      Head: Normocephalic and atraumatic.      Right Ear: External ear normal.      Left Ear: External ear normal.      Nose: Nose normal.      Mouth/Throat:      Mouth: Mucous membranes are moist.      Pharynx: Oropharynx is clear. No oropharyngeal exudate.   Eyes:      General: No scleral icterus.     Extraocular Movements: Extraocular movements intact.      Conjunctiva/sclera: Conjunctivae normal.      Pupils: Pupils are equal, round, and reactive to light.   Cardiovascular:      Rate and Rhythm: Normal rate and regular rhythm.      Heart sounds: Normal heart sounds. No murmur heard.  Pulmonary:      Effort: Pulmonary effort is normal.      Breath sounds: Normal breath sounds. No wheezing or rales.   Abdominal:      General: Bowel sounds are normal. There is no distension.      Palpations: Abdomen is soft.      Tenderness: There is no abdominal tenderness.   Musculoskeletal:         General: No deformity. Normal range of motion.      Cervical back:  Normal range of motion and neck supple.      Right lower leg: No edema.      Left lower leg: No edema.   Lymphadenopathy:      Cervical: No cervical adenopathy.   Skin:     General: Skin is warm and dry.   Neurological:      Mental Status: She is alert and oriented to person, place, and time.   Psychiatric:         Behavior: Behavior normal.       LAB:   Results for orders placed or performed during the hospital encounter of 02/25/23   Blood culture #1    Specimen: Peripheral, Forearm, Left; Blood   Result Value Ref Range    Blood Culture, Routine No Growth to date     Blood Culture, Routine No Growth to date     Blood Culture, Routine No Growth to date     Blood Culture, Routine No Growth to date     Blood Culture, Routine No Growth to date    Blood culture #2    Specimen: Peripheral, Antecubital, Left; Blood   Result Value Ref Range    Blood Culture, Routine No Growth to date     Blood Culture, Routine No Growth to date     Blood Culture, Routine No Growth to date     Blood Culture, Routine No Growth to date     Blood Culture, Routine No Growth to date    HIV 1/2 Ag/Ab (4th Gen)   Result Value Ref Range    HIV 1/2 Ag/Ab Non-reactive Non-reactive   Hepatitis C Antibody   Result Value Ref Range    Hepatitis C Ab Non-reactive Non-reactive   CBC auto differential   Result Value Ref Range    .60 (HH) 3.90 - 12.70 K/uL    RBC 3.80 (L) 4.00 - 5.40 M/uL    Hemoglobin 11.1 (L) 12.0 - 16.0 g/dL    Hematocrit 36.7 (L) 37.0 - 48.5 %    MCV 97 82 - 98 fL    MCH 29.2 27.0 - 31.0 pg    MCHC 30.2 (L) 32.0 - 36.0 g/dL    RDW 17.2 (H) 11.5 - 14.5 %    Platelets 165 150 - 450 K/uL    MPV 11.5 9.2 - 12.9 fL    Immature Granulocytes CANCELED 0.0 - 0.5 %    Immature Grans (Abs) CANCELED 0.00 - 0.04 K/uL    nRBC 1 (A) 0 /100 WBC    Gran % 58.5 38.0 - 73.0 %    Lymph % 5.5 (L) 18.0 - 48.0 %    Mono % 3.0 (L) 4.0 - 15.0 %    Eosinophil % 0.5 0.0 - 8.0 %    Basophil % 4.5 (H) 0.0 - 1.9 %    Bands 13.0 %    Metamyelocytes 4.5 %     Myelocytes 6.0 %    Promyelocytes 2.0 %    Other 3 %    Aniso Slight     Poik Slight     Poly Occasional     Ovalocytes Occasional     Tear Drop Cells Occasional     Basophilic Stippling Occasional     Differential Method Manual    Comprehensive metabolic panel   Result Value Ref Range    Sodium 137 136 - 145 mmol/L    Potassium 3.9 3.5 - 5.1 mmol/L    Chloride 105 95 - 110 mmol/L    CO2 22 (L) 23 - 29 mmol/L    Glucose 89 70 - 110 mg/dL    BUN 12 6 - 20 mg/dL    Creatinine 0.8 0.5 - 1.4 mg/dL    Calcium 10.2 8.7 - 10.5 mg/dL    Total Protein 8.1 6.0 - 8.4 g/dL    Albumin 4.4 3.5 - 5.2 g/dL    Total Bilirubin 0.4 0.1 - 1.0 mg/dL    Alkaline Phosphatase 83 55 - 135 U/L    AST 31 10 - 40 U/L    ALT 26 10 - 44 U/L    Anion Gap 10 8 - 16 mmol/L    eGFR >60.0 >60 mL/min/1.73 m^2   Protime-INR   Result Value Ref Range    Prothrombin Time 10.9 9.0 - 12.5 sec    INR 1.0 0.8 - 1.2   Lactic acid, plasma   Result Value Ref Range    Lactate (Lactic Acid) 0.9 0.5 - 2.2 mmol/L   Magnesium   Result Value Ref Range    Magnesium 2.2 1.6 - 2.6 mg/dL   Phosphorus   Result Value Ref Range    Phosphorus 3.5 2.7 - 4.5 mg/dL   Uric Acid   Result Value Ref Range    Uric Acid 6.9 (H) 2.4 - 5.7 mg/dL   Pathologist Interpretation Differential   Result Value Ref Range    Pathologist Review Review completed    Haptoglobin   Result Value Ref Range    Haptoglobin 155 30 - 250 mg/dL   Lactate Dehydrogenase   Result Value Ref Range     (H) 110 - 260 U/L   Pathologist Review   Result Value Ref Range    Pathologist Review Peripheral Smear REVIEWED        PROBLEMS ASSESSED THIS VISIT:    1. CML (chronic myelocytic leukemia)    2. Elevated blood uric acid level        ASSESSMENT AND PLAN    1  47 year old female with minimal past medical history who presented to hospital with elevated WBC count. WBC count of 129 and mild anemia with Hg of 11 but no thrombocytopenia. CMP grossly wnl. Elevated LDH to 817, and elevated uric acid to 6.9. Peripheral  smear reviewed with findings of arked leukocytosis with a granulocytic predominance. Granulocytes are left shifted to the myelocyte stage.  Occasional blasts and promyelocytes are present, estimated less than 5%. Basophils are increased. Her presentation, initial lab work and clinical presentation indicative of CML chronic phase.     - Will get BCR-ABL peripheral blood  - Hep B studies prior to treatment with TKI  - Patient will need CT CAP as baseline line imaging; eval for splenomegaly  - Bone marrow biopsy tentatively planned for 3/08  - Patient is self pay, will have her discuss this with social work/financial aid prior to drawing labs, getting imaging, and starting treatment    2  - Uric acid 6.9  - Will start allopurinol 300 mg daily    Discussed with Dr. Jared Oviedo MD  PGY-IV  Hematology/Oncology Fellow

## 2023-03-03 ENCOUNTER — DOCUMENTATION ONLY (OUTPATIENT)
Dept: HEMATOLOGY/ONCOLOGY | Facility: CLINIC | Age: 48
End: 2023-03-03

## 2023-03-03 NOTE — PROGRESS NOTES
[facility] received a call from pt requesting private pay cost for scheduled procedure on Wednesday, March 8, 2023. LCSW consulted with [facility] LCSW, Laxmi Estrada. KAIT Estrada advised LCSW to send high priority message to [email or web address] [email or web address] services to request that a  call pt to provide a quote for procedure. LCSW sent the following in basket high priority. LCSW will follow and assist.     DYLAN Jeffers; Alberto Garrido; [facility]illing; [employer]  Caller: Unspecified (Today,  1:07 PM)  Good afternoon,   Pt is requesting the self pay cost of her scheduled procedure. Pt is part of a Medi-share program and has no traditional health insurance. Pt reports that she will have to self pay for the procedure.     Respectfully,     Ailin Garcia LCSW   Oncology Social Worker License # [id number]   [facility]   [email or web address]   P. [phone]; F. [phone]

## 2023-03-07 ENCOUNTER — DOCUMENTATION ONLY (OUTPATIENT)
Dept: ONCOLOGY | Facility: HOSPITAL | Age: 48
End: 2023-03-07

## 2023-03-07 NOTE — PROGRESS NOTES
"[facility] with financial assistance supervisor, Alberto Garrido. Alberto reports that she will yenni pt's chart "financially cleared" so that pt will not be charged at time of service for her scheduled procedure on Wednesday, 3/8/2023. LCSW also followed up with [facility] supervisor, Zaki Graham. Zaki Graham informed LCSW via email "Currently she is over the income limit for [employer], the only way she can possibly get coverage is if her medical bills are 3x more than her household monthly income." [facility] also informed [facility] financial assistance counselor, Delia Reed, or Mr. Graham's response. [facility] will follow up with Delia Reed regarding outcome of financial assistance application.       "

## 2023-03-08 ENCOUNTER — PROCEDURE VISIT (OUTPATIENT)
Dept: HEMATOLOGY/ONCOLOGY | Facility: CLINIC | Age: 48
End: 2023-03-08

## 2023-03-08 VITALS
RESPIRATION RATE: 16 BRPM | DIASTOLIC BLOOD PRESSURE: 86 MMHG | TEMPERATURE: 98 F | SYSTOLIC BLOOD PRESSURE: 149 MMHG | HEART RATE: 97 BPM | OXYGEN SATURATION: 98 %

## 2023-03-08 DIAGNOSIS — C92.10 CML (CHRONIC MYELOCYTIC LEUKEMIA): Primary | ICD-10-CM

## 2023-03-08 LAB
BODY SITE - BONE MARROW: NORMAL
CLINICAL DIAGNOSIS - BONE MARROW: NORMAL
FLOW CYTOMETRY ANTIBODIES ANALYZED - BONE MARROW: NORMAL
FLOW CYTOMETRY COMMENT - BONE MARROW: NORMAL
FLOW CYTOMETRY INTERPRETATION - BONE MARROW: NORMAL

## 2023-03-08 PROCEDURE — 88305 TISSUE EXAM BY PATHOLOGIST: CPT | Mod: 26,,, | Performed by: PATHOLOGY

## 2023-03-08 PROCEDURE — 88311 DECALCIFY TISSUE: CPT | Performed by: PATHOLOGY

## 2023-03-08 PROCEDURE — 81206 BCR/ABL1 GENE MAJOR BP: CPT | Performed by: NURSE PRACTITIONER

## 2023-03-08 PROCEDURE — 88299 UNLISTED CYTOGENETIC STUDY: CPT | Performed by: NURSE PRACTITIONER

## 2023-03-08 PROCEDURE — 88313 SPECIAL STAINS GROUP 2: CPT | Performed by: PATHOLOGY

## 2023-03-08 PROCEDURE — 38222 BONE MARROW: ICD-10-PCS | Mod: LT,,, | Performed by: NURSE PRACTITIONER

## 2023-03-08 PROCEDURE — 88264 CHROMOSOME ANALYSIS 20-25: CPT | Performed by: NURSE PRACTITIONER

## 2023-03-08 PROCEDURE — 38222 DX BONE MARROW BX & ASPIR: CPT | Mod: LT,,, | Performed by: NURSE PRACTITIONER

## 2023-03-08 PROCEDURE — 88189 PR  FLOWCYTOMETRY/READ, 16 & > MARKERS: ICD-10-PCS | Mod: ,,, | Performed by: PATHOLOGY

## 2023-03-08 PROCEDURE — 88342 CHG IMMUNOCYTOCHEMISTRY: ICD-10-PCS | Mod: 26,59,, | Performed by: PATHOLOGY

## 2023-03-08 PROCEDURE — 88341 IMHCHEM/IMCYTCHM EA ADD ANTB: CPT | Mod: 26,59,, | Performed by: PATHOLOGY

## 2023-03-08 PROCEDURE — 88184 FLOWCYTOMETRY/ TC 1 MARKER: CPT | Performed by: PATHOLOGY

## 2023-03-08 PROCEDURE — 88341 PR IHC OR ICC EACH ADD'L SINGLE ANTIBODY  STAINPR: ICD-10-PCS | Mod: 26,59,, | Performed by: PATHOLOGY

## 2023-03-08 PROCEDURE — 88313 PR  SPECIAL STAINS,GROUP II: ICD-10-PCS | Mod: 26,,, | Performed by: PATHOLOGY

## 2023-03-08 PROCEDURE — 88311 PR  DECALCIFY TISSUE: ICD-10-PCS | Mod: 26,,, | Performed by: PATHOLOGY

## 2023-03-08 PROCEDURE — 88342 IMHCHEM/IMCYTCHM 1ST ANTB: CPT | Performed by: PATHOLOGY

## 2023-03-08 PROCEDURE — 88342 IMHCHEM/IMCYTCHM 1ST ANTB: CPT | Mod: 26,59,, | Performed by: PATHOLOGY

## 2023-03-08 PROCEDURE — 88185 FLOWCYTOMETRY/TC ADD-ON: CPT | Mod: 59 | Performed by: PATHOLOGY

## 2023-03-08 PROCEDURE — 88305 TISSUE EXAM BY PATHOLOGIST: ICD-10-PCS | Mod: 26,,, | Performed by: PATHOLOGY

## 2023-03-08 PROCEDURE — 88189 FLOWCYTOMETRY/READ 16 & >: CPT | Mod: ,,, | Performed by: PATHOLOGY

## 2023-03-08 PROCEDURE — 85097 BONE MARROW INTERPRETATION: CPT | Mod: ,,, | Performed by: PATHOLOGY

## 2023-03-08 PROCEDURE — 88237 TISSUE CULTURE BONE MARROW: CPT | Performed by: NURSE PRACTITIONER

## 2023-03-08 PROCEDURE — 88313 SPECIAL STAINS GROUP 2: CPT | Mod: 26,,, | Performed by: PATHOLOGY

## 2023-03-08 PROCEDURE — 88341 IMHCHEM/IMCYTCHM EA ADD ANTB: CPT | Mod: 59 | Performed by: PATHOLOGY

## 2023-03-08 PROCEDURE — 85097 PR  BONE MARROW,SMEAR INTERPRETATION: ICD-10-PCS | Mod: ,,, | Performed by: PATHOLOGY

## 2023-03-08 PROCEDURE — 88305 TISSUE EXAM BY PATHOLOGIST: CPT | Mod: 59 | Performed by: PATHOLOGY

## 2023-03-08 PROCEDURE — 88311 DECALCIFY TISSUE: CPT | Mod: 26,,, | Performed by: PATHOLOGY

## 2023-03-08 RX ORDER — LIDOCAINE HYDROCHLORIDE 20 MG/ML
8 INJECTION, SOLUTION INFILTRATION; PERINEURAL
Status: COMPLETED | OUTPATIENT
Start: 2023-03-08 | End: 2023-03-08

## 2023-03-08 RX ADMIN — LIDOCAINE HYDROCHLORIDE 8 ML: 20 INJECTION, SOLUTION INFILTRATION; PERINEURAL at 08:03

## 2023-03-08 NOTE — PROCEDURES
Bone marrow    Date/Time: 3/8/2023 8:00 AM  Performed by: Radha Gage NP  Authorized by: Radha Gage NP     Consent Done?: Yes (Written)      Position: prone  Aspiration?: Yes   Biopsy?: Yes      PROCEDURE NOTE:  Date of Procedure: 03/08/2023  Bone Marrow Biopsy and Aspiration  Indication: CML; diagnostic  Consent: Informed consent was obtained from patient.  Timeout: Done and documented.  Position: prone  Site: Left posterior illiac crest.  Prep: Betadine.  Needle used: 11 gauge Jamshidi needle.  Anesthetic: 2% lidocaine 8cc.  Biopsy: The biopsy needle was introduced into the marrow cavity and an aspirate was obtained without complications and sent for flow cytometry, BCR/ABL p210, DNA/RNA hold, and cytogenetics. Core biopsy obtained without difficulty and sent for routine histologic examination.  Complications: None.  Disposition: The patient was placed supine for 15min following procedure. MA to assess bandaid for bleeding prior to discharge home. Patient aware to keep bandaid dry and intact for 24hrs and to call clinic for any bleeding, fevers, pain, or signs of infection.  Blood loss: Minimal.     Radha Gage NP  Hematology/Oncology/BMT

## 2023-03-08 NOTE — PROGRESS NOTES
Ms. Sandoval is a 47 y.o. female, patient of Dr. Oviedo and Dr. Lamar, who presents today for diagnostic bone marrow biopsy for CML. Procedure explained, consent obtained. See procedure note    Radha Gage NP  Hematology/Oncology/BMT

## 2023-03-09 PROBLEM — C92.10 CML (CHRONIC MYELOCYTIC LEUKEMIA): Status: ACTIVE | Noted: 2023-03-09

## 2023-03-09 LAB
DNA/RNA EXTRACT AND HOLD RESULT: NORMAL
DNA/RNA EXTRACTION: NORMAL
EXHR SPECIMEN TYPE: NORMAL

## 2023-03-10 LAB
BCR/ABL,P210 RESULT: NORMAL
PATH REPORT.FINAL DX SPEC: NORMAL
SPECIMEN TYPE: NORMAL

## 2023-03-14 ENCOUNTER — TELEPHONE (OUTPATIENT)
Dept: HEMATOLOGY/ONCOLOGY | Facility: CLINIC | Age: 48
End: 2023-03-14

## 2023-03-14 ENCOUNTER — LAB VISIT (OUTPATIENT)
Dept: LAB | Facility: HOSPITAL | Age: 48
End: 2023-03-14

## 2023-03-14 DIAGNOSIS — C92.10 CML (CHRONIC MYELOCYTIC LEUKEMIA): ICD-10-CM

## 2023-03-14 DIAGNOSIS — E79.0 ELEVATED BLOOD URIC ACID LEVEL: ICD-10-CM

## 2023-03-14 LAB
ALBUMIN SERPL BCP-MCNC: 3.7 G/DL (ref 3.5–5.2)
ALP SERPL-CCNC: 60 U/L (ref 55–135)
ALT SERPL W/O P-5'-P-CCNC: 20 U/L (ref 10–44)
ANION GAP SERPL CALC-SCNC: 6 MMOL/L (ref 8–16)
ANISOCYTOSIS BLD QL SMEAR: SLIGHT
AST SERPL-CCNC: 23 U/L (ref 10–40)
BASOPHILS NFR BLD: 1 % (ref 0–1.9)
BILIRUB SERPL-MCNC: 0.5 MG/DL (ref 0.1–1)
BUN SERPL-MCNC: 7 MG/DL (ref 6–20)
CALCIUM SERPL-MCNC: 9.4 MG/DL (ref 8.7–10.5)
CHLORIDE SERPL-SCNC: 106 MMOL/L (ref 95–110)
CO2 SERPL-SCNC: 25 MMOL/L (ref 23–29)
CREAT SERPL-MCNC: 0.7 MG/DL (ref 0.5–1.4)
DIFFERENTIAL METHOD: ABNORMAL
EOSINOPHIL NFR BLD: 0 % (ref 0–8)
ERYTHROCYTE [DISTWIDTH] IN BLOOD BY AUTOMATED COUNT: 17.3 % (ref 11.5–14.5)
EST. GFR  (NO RACE VARIABLE): >60 ML/MIN/1.73 M^2
GLUCOSE SERPL-MCNC: 91 MG/DL (ref 70–110)
HBV CORE AB SERPL QL IA: NORMAL
HBV SURFACE AB SER-ACNC: >1000 MIU/ML
HBV SURFACE AB SER-ACNC: REACTIVE M[IU]/ML
HBV SURFACE AG SERPL QL IA: NORMAL
HCT VFR BLD AUTO: 33.7 % (ref 37–48.5)
HGB BLD-MCNC: 9.9 G/DL (ref 12–16)
IMM GRANULOCYTES # BLD AUTO: ABNORMAL K/UL (ref 0–0.04)
IMM GRANULOCYTES NFR BLD AUTO: ABNORMAL % (ref 0–0.5)
LDH SERPL L TO P-CCNC: 638 U/L (ref 110–260)
LYMPHOCYTES NFR BLD: 8 % (ref 18–48)
MCH RBC QN AUTO: 29.2 PG (ref 27–31)
MCHC RBC AUTO-ENTMCNC: 29.4 G/DL (ref 32–36)
MCV RBC AUTO: 99 FL (ref 82–98)
METAMYELOCYTES NFR BLD MANUAL: 6 %
MONOCYTES NFR BLD: 5 % (ref 4–15)
MYELOCYTES NFR BLD MANUAL: 5 %
NEUTROPHILS NFR BLD: 64 % (ref 38–73)
NEUTS BAND NFR BLD MANUAL: 6 %
NRBC BLD-RTO: 2 /100 WBC
PLATELET # BLD AUTO: 147 K/UL (ref 150–450)
PLATELET BLD QL SMEAR: ABNORMAL
PMV BLD AUTO: 12 FL (ref 9.2–12.9)
POIKILOCYTOSIS BLD QL SMEAR: SLIGHT
POTASSIUM SERPL-SCNC: 4 MMOL/L (ref 3.5–5.1)
PROT SERPL-MCNC: 7 G/DL (ref 6–8.4)
RBC # BLD AUTO: 3.39 M/UL (ref 4–5.4)
SODIUM SERPL-SCNC: 137 MMOL/L (ref 136–145)
URATE SERPL-MCNC: 5.6 MG/DL (ref 2.4–5.7)
WBC # BLD AUTO: 114.3 K/UL (ref 3.9–12.7)
WBC OTHER NFR BLD MANUAL: 5 %

## 2023-03-14 PROCEDURE — 36415 COLL VENOUS BLD VENIPUNCTURE: CPT | Mod: PO | Performed by: STUDENT IN AN ORGANIZED HEALTH CARE EDUCATION/TRAINING PROGRAM

## 2023-03-14 PROCEDURE — 84550 ASSAY OF BLOOD/URIC ACID: CPT | Performed by: STUDENT IN AN ORGANIZED HEALTH CARE EDUCATION/TRAINING PROGRAM

## 2023-03-14 PROCEDURE — 85027 COMPLETE CBC AUTOMATED: CPT | Performed by: STUDENT IN AN ORGANIZED HEALTH CARE EDUCATION/TRAINING PROGRAM

## 2023-03-14 PROCEDURE — 85007 BL SMEAR W/DIFF WBC COUNT: CPT | Performed by: STUDENT IN AN ORGANIZED HEALTH CARE EDUCATION/TRAINING PROGRAM

## 2023-03-14 PROCEDURE — 86704 HEP B CORE ANTIBODY TOTAL: CPT | Performed by: STUDENT IN AN ORGANIZED HEALTH CARE EDUCATION/TRAINING PROGRAM

## 2023-03-14 PROCEDURE — 81206 BCR/ABL1 GENE MAJOR BP: CPT | Performed by: STUDENT IN AN ORGANIZED HEALTH CARE EDUCATION/TRAINING PROGRAM

## 2023-03-14 PROCEDURE — 83615 LACTATE (LD) (LDH) ENZYME: CPT | Performed by: STUDENT IN AN ORGANIZED HEALTH CARE EDUCATION/TRAINING PROGRAM

## 2023-03-14 PROCEDURE — 87340 HEPATITIS B SURFACE AG IA: CPT | Performed by: STUDENT IN AN ORGANIZED HEALTH CARE EDUCATION/TRAINING PROGRAM

## 2023-03-14 PROCEDURE — 86706 HEP B SURFACE ANTIBODY: CPT | Mod: 91 | Performed by: STUDENT IN AN ORGANIZED HEALTH CARE EDUCATION/TRAINING PROGRAM

## 2023-03-14 PROCEDURE — 80053 COMPREHEN METABOLIC PANEL: CPT | Performed by: STUDENT IN AN ORGANIZED HEALTH CARE EDUCATION/TRAINING PROGRAM

## 2023-03-16 ENCOUNTER — OFFICE VISIT (OUTPATIENT)
Dept: HEMATOLOGY/ONCOLOGY | Facility: CLINIC | Age: 48
End: 2023-03-16

## 2023-03-16 VITALS
WEIGHT: 158.19 LBS | HEIGHT: 60 IN | HEART RATE: 91 BPM | BODY MASS INDEX: 31.06 KG/M2 | RESPIRATION RATE: 20 BRPM | OXYGEN SATURATION: 100 % | TEMPERATURE: 98 F | DIASTOLIC BLOOD PRESSURE: 95 MMHG | SYSTOLIC BLOOD PRESSURE: 167 MMHG

## 2023-03-16 DIAGNOSIS — C92.10 CML (CHRONIC MYELOCYTIC LEUKEMIA): Primary | ICD-10-CM

## 2023-03-16 DIAGNOSIS — D53.9 MACROCYTIC ANEMIA: ICD-10-CM

## 2023-03-16 DIAGNOSIS — E79.0 ELEVATED URIC ACID IN BLOOD: ICD-10-CM

## 2023-03-16 PROBLEM — D64.89 OTHER SPECIFIED ANEMIAS: Status: RESOLVED | Noted: 2023-03-16 | Resolved: 2023-03-16

## 2023-03-16 PROBLEM — D64.89 OTHER SPECIFIED ANEMIAS: Status: ACTIVE | Noted: 2023-03-16

## 2023-03-16 LAB
BCR/ABL,P210 RESULT: NORMAL
PATH REPORT.FINAL DX SPEC: NORMAL
SPECIMEN TYPE: NORMAL

## 2023-03-16 PROCEDURE — 99999 PR PBB SHADOW E&M-EST. PATIENT-LVL III: CPT | Mod: PBBFAC,,, | Performed by: STUDENT IN AN ORGANIZED HEALTH CARE EDUCATION/TRAINING PROGRAM

## 2023-03-16 PROCEDURE — 99213 OFFICE O/P EST LOW 20 MIN: CPT | Mod: S$PBB,,, | Performed by: STUDENT IN AN ORGANIZED HEALTH CARE EDUCATION/TRAINING PROGRAM

## 2023-03-16 PROCEDURE — 99213 PR OFFICE/OUTPT VISIT, EST, LEVL III, 20-29 MIN: ICD-10-PCS | Mod: S$PBB,,, | Performed by: STUDENT IN AN ORGANIZED HEALTH CARE EDUCATION/TRAINING PROGRAM

## 2023-03-16 PROCEDURE — 99213 OFFICE O/P EST LOW 20 MIN: CPT | Mod: PBBFAC | Performed by: STUDENT IN AN ORGANIZED HEALTH CARE EDUCATION/TRAINING PROGRAM

## 2023-03-16 PROCEDURE — 99999 PR PBB SHADOW E&M-EST. PATIENT-LVL III: ICD-10-PCS | Mod: PBBFAC,,, | Performed by: STUDENT IN AN ORGANIZED HEALTH CARE EDUCATION/TRAINING PROGRAM

## 2023-03-16 RX ORDER — IMATINIB MESYLATE 400 MG/1
400 TABLET, FILM COATED ORAL DAILY
Qty: 30 TABLET | Refills: 11 | OUTPATIENT
Start: 2023-03-16 | End: 2023-03-20

## 2023-03-16 NOTE — PROGRESS NOTES
BMT Chart Routing      Follow up with physician 1 month. already scheduled   Follow up with MICHELL    Provider visit type    Infusion scheduling note    Injection scheduling note    Labs CBC, CMP and uric acid   Scheduling:  Preferred lab:  Lab interval:  cbc and cmp weekly for 1 month, uric acid in 1 month (already scheduled, labs linked)   Imaging    Pharmacy appointment    Other referrals           HEMATOLOGY ONCOLOGY FELLOW CLINIC    IDENTIFYING STATEMENT   Audie Sandoval (Audie) is a 47 y.o. female with history of hypertension who presents to clinic for further evaluation and management of her leukocytosis    HISTORY OF PRESENT ILLNESS:    Patient initially alerted to her elevated WBC count when she got lab work through [employer]. After having blood drawn she received her results about one week later which showed WBC count of 120k. She discussed this with a friend of her's who is a nurse who encouraged her to be evaluated in emergency department. She presented to [facility] main campus where hematology team was alerted by ED physician out of concern for acute leukemia. Hematology team evaluated patient's ED lab work. WBC count of 129 and mild anemia with Hg of 11 but no thrombocytopenia. CMP grossly wnl. Elevated LDH to 817, and elevated uric acid to 6.9. Peripheral smear reviewed with findings of arked leukocytosis with a granulocytic predominance.   Granulocytes are left shifted to the myelocyte stage.  Occasional blasts and promyelocytes are present, estimated less than 5%. Basophils are increased. Patient evaluated by hematology at bedside. Physical exam unremarkable and patient denies major systemic symptoms. Likely diagnosis of CML (chronic phase) was made with discharge directly from ED to clinic on 3/2 for further work up and management.    --------------------------------------------------------------------------------------------------------------------------------------------------------------------------------------------------  Patient reports general fatigue during time of follow up appointment including reduced exercise capacity. She is currently being evaluated for financial aid (medicaid, obama care). She denies any other new systemic symptoms. She tolerated bone marrow biopsy well. Bone marrow pathology significant for 100% cellularity with myeloid predominance (M/E= 11:1) consistent with chronic myeloid leukemia, bcr-abl 1 positive, chronic phase; chromosomal analysis pending. BCR/ABL1 p210 mRNA 27%. Labs with worsening anemia and mild thrombocytopenia. CMP with no liver or kidney dysfunction.     Past Medical History:   Diagnosis Date    Allergy     Gestational hypertension        Family History   Problem Relation Age of Onset    Hypertension Mother     Arthritis Mother         DJD spine    Abnormal EKG Father     Diabetes Father        Social History     Socioeconomic History    Marital status:    Tobacco Use    Smoking status: Never    Smokeless tobacco: Never   Substance and Sexual Activity    Alcohol use: Yes     Comment: Rare    Drug use: Never    Sexual activity: Yes     Partners: Male         MEDICATIONS:     Current Outpatient Medications on File Prior to Visit   Medication Sig Dispense Refill    acyclovir (ZOVIRAX) 400 MG tablet Take 400 mg by mouth 2 (two) times daily.      ALBUTEROL, REFILL, INHL       allopurinoL (ZYLOPRIM) 300 MG tablet Take 1 tablet (300 mg total) by mouth once daily. 30 tablet 3    azithromycin (Z-NOHEMY) 250 MG tablet Take 1 tablet (250 mg total) by mouth once daily. Take first 2 tablets together, then 1 every day until finished. 6 tablet 0     No current facility-administered medications on file prior to visit.       ALLERGIES:   Review of patient's allergies indicates:   Allergen  Reactions    Codeine     Wheat containing prod         ROS:       Review of Systems   Constitutional:  Negative for appetite change, chills, fatigue, fever and unexpected weight change.   HENT:   Negative for sore throat and trouble swallowing.    Respiratory:  Negative for cough, shortness of breath and wheezing.    Cardiovascular:  Negative for leg swelling and palpitations.   Gastrointestinal:  Negative for abdominal pain, diarrhea and nausea.   Genitourinary:  Negative for dysuria and frequency.    Musculoskeletal:  Negative for arthralgias and myalgias.   Skin:  Negative for rash.   Neurological:  Negative for dizziness, headaches and light-headedness.   Hematological:  Negative for adenopathy. Does not bruise/bleed easily.   Psychiatric/Behavioral:  Negative for confusion and decreased concentration. The patient is nervous/anxious.      PHYSICAL EXAM:  Vitals:    03/16/23 0832   BP: (!) 167/95   Pulse: 91   Resp: 20   Temp: 98.4 °F (36.9 °C)   TempSrc: Oral   SpO2: 100%   Weight: 71.7 kg (158 lb 2.9 oz)   Height: 5' (1.524 m)       Physical Exam  Vitals reviewed.   Constitutional:       General: She is not in acute distress.     Appearance: She is well-developed. She is not toxic-appearing.   HENT:      Head: Normocephalic and atraumatic.      Right Ear: External ear normal.      Left Ear: External ear normal.      Nose: Nose normal.      Mouth/Throat:      Mouth: Mucous membranes are moist.      Pharynx: Oropharynx is clear. No oropharyngeal exudate.   Eyes:      General: No scleral icterus.     Extraocular Movements: Extraocular movements intact.      Conjunctiva/sclera: Conjunctivae normal.      Pupils: Pupils are equal, round, and reactive to light.   Cardiovascular:      Rate and Rhythm: Normal rate and regular rhythm.      Heart sounds: Normal heart sounds. No murmur heard.  Pulmonary:      Effort: Pulmonary effort is normal.      Breath sounds: Normal breath sounds. No wheezing or rales.   Abdominal:       General: Bowel sounds are normal. There is no distension.      Palpations: Abdomen is soft.      Tenderness: There is no abdominal tenderness.   Musculoskeletal:         General: No deformity. Normal range of motion.      Cervical back: Normal range of motion and neck supple.      Right lower leg: No edema.      Left lower leg: No edema.   Lymphadenopathy:      Cervical: No cervical adenopathy.   Skin:     General: Skin is warm and dry.   Neurological:      Mental Status: She is alert and oriented to person, place, and time.   Psychiatric:         Behavior: Behavior normal.       LAB:   Results for orders placed or performed in visit on 03/14/23   BCR/ABL, p210, Quant, Monitor, blood   Result Value Ref Range    Specimen Type, p210, Quant, bld Blood    HEPATITIS B SURFACE ANTIBODY   Result Value Ref Range    Hep B S Ab >1000.00 mIU/mL    Hep B S Ab Reactive    HEPATITIS B CORE ANTIBODY, TOTAL   Result Value Ref Range    Hep B Core Total Ab Non-reactive Non-reactive   HEPATITIS B SURFACE ANTIGEN   Result Value Ref Range    Hepatitis B Surface Ag Non-reactive Non-reactive   CBC W/ AUTO DIFFERENTIAL   Result Value Ref Range    .30 (HH) 3.90 - 12.70 K/uL    RBC 3.39 (L) 4.00 - 5.40 M/uL    Hemoglobin 9.9 (L) 12.0 - 16.0 g/dL    Hematocrit 33.7 (L) 37.0 - 48.5 %    MCV 99 (H) 82 - 98 fL    MCH 29.2 27.0 - 31.0 pg    MCHC 29.4 (L) 32.0 - 36.0 g/dL    RDW 17.3 (H) 11.5 - 14.5 %    Platelets 147 (L) 150 - 450 K/uL    MPV 12.0 9.2 - 12.9 fL    Immature Granulocytes CANCELED 0.0 - 0.5 %    Immature Grans (Abs) CANCELED 0.00 - 0.04 K/uL    nRBC 2 (A) 0 /100 WBC    Gran % 64.0 38.0 - 73.0 %    Lymph % 8.0 (L) 18.0 - 48.0 %    Mono % 5.0 4.0 - 15.0 %    Eosinophil % 0.0 0.0 - 8.0 %    Basophil % 1.0 0.0 - 1.9 %    Bands 6.0 %    Metamyelocytes 6.0 %    Myelocytes 5.0 %    Other 5 %    Platelet Estimate Appears normal     Aniso Slight     Poik Slight     Differential Method Manual    COMPREHENSIVE METABOLIC PANEL    Result Value Ref Range    Sodium 137 136 - 145 mmol/L    Potassium 4.0 3.5 - 5.1 mmol/L    Chloride 106 95 - 110 mmol/L    CO2 25 23 - 29 mmol/L    Glucose 91 70 - 110 mg/dL    BUN 7 6 - 20 mg/dL    Creatinine 0.7 0.5 - 1.4 mg/dL    Calcium 9.4 8.7 - 10.5 mg/dL    Total Protein 7.0 6.0 - 8.4 g/dL    Albumin 3.7 3.5 - 5.2 g/dL    Total Bilirubin 0.5 0.1 - 1.0 mg/dL    Alkaline Phosphatase 60 55 - 135 U/L    AST 23 10 - 40 U/L    ALT 20 10 - 44 U/L    Anion Gap 6 (L) 8 - 16 mmol/L    eGFR >60.0 >60 mL/min/1.73 m^2   LACTATE DEHYDROGENASE   Result Value Ref Range     (H) 110 - 260 U/L   URIC ACID   Result Value Ref Range    Uric Acid 5.6 2.4 - 5.7 mg/dL       PROBLEMS ASSESSED THIS VISIT:    1. CML (chronic myelocytic leukemia)    2. Elevated uric acid in blood    3. Macrocytic anemia        ASSESSMENT AND PLAN    1  47 year old female with minimal past medical history who presented to hospital with elevated WBC count. WBC count of 129 and mild anemia with Hg of 11 but no thrombocytopenia. CMP grossly wnl. Elevated LDH to 817, and elevated uric acid to 6.9. Peripheral smear reviewed with findings of arked leukocytosis with a granulocytic predominance. Granulocytes are left shifted to the myelocyte stage.  Occasional blasts and promyelocytes are present, estimated less than 5%. Basophils are increased. Her presentation, initial lab work and clinical presentation indicative of CML chronic phase.     Bone marrow biopsy done on 3/08. Bone marrow pathology significant for 100% cellularity with myeloid predominance (M/E= 11:1) consistent with chronic myeloid leukemia, bcr-abl 1 positive, chronic phase; chromosomal analysis pending. BCR/ABL1 p210 mRNA 27%.    - Will prescribe imatinib 400 mg daily. Had long discussion regarding treatment course and possible side effects (fluid retention, nausea, etc)  - Will get weekly labs (cbc, cmp) for the first month, q2 week labs in the second month prior to spacing out labs to  monthly. Recheck bcr-abl in 3 months  - Follow up with me in person in 1 month  - BCR-ABL peripheral blood pending  - Hep B studies reviewed, negative  - Patient is self pay, she is in discussion with financial aid    2  - Uric acid 6.9 on initial labs, repeat shows uric acid of 5.6  - Patient requesting to hold off on starting medication for now  - Repeat uric acid with labs in 1 month  - prescription for allopurinol 300 mg daily placed    3  - Will add iron studies, folate, b12  - Supplement if deficient    Discussed with Dr. Wero Oviedo MD  PGY-IV  Hematology/Oncology Fellow

## 2023-03-17 ENCOUNTER — PATIENT MESSAGE (OUTPATIENT)
Dept: HEMATOLOGY/ONCOLOGY | Facility: CLINIC | Age: 48
End: 2023-03-17

## 2023-03-17 LAB
CHROM BANDING METHOD: NORMAL
CHROMOSOME ANALYSIS BM ADDITIONAL INFORMATION: NORMAL
CHROMOSOME ANALYSIS BM RELEASED BY: NORMAL
CHROMOSOME ANALYSIS BM RESULT SUMMARY: NORMAL
CLINICAL CYTOGENETICIST REVIEW: NORMAL
COMMENT: NORMAL
FINAL PATHOLOGIC DIAGNOSIS: NORMAL
GROSS: NORMAL
KARYOTYP MAR: NORMAL
Lab: NORMAL
MICROSCOPIC EXAM: NORMAL
REASON FOR REFERRAL (NARRATIVE): NORMAL
REF LAB TEST METHOD: NORMAL
SPECIMEN SOURCE: NORMAL
SPECIMEN: NORMAL
SUPPLEMENTAL DIAGNOSIS: NORMAL

## 2023-03-18 ENCOUNTER — SPECIALTY PHARMACY (OUTPATIENT)
Dept: PHARMACY | Facility: CLINIC | Age: 48
End: 2023-03-18

## 2023-03-20 RX ORDER — IMATINIB MESYLATE 400 MG/1
400 TABLET, FILM COATED ORAL DAILY
Qty: 30 TABLET | Refills: 11 | Status: SHIPPED | OUTPATIENT
Start: 2023-03-20 | End: 2023-07-12

## 2023-03-22 ENCOUNTER — TELEPHONE (OUTPATIENT)
Dept: HEMATOLOGY/ONCOLOGY | Facility: CLINIC | Age: 48
End: 2023-03-22

## 2023-03-22 NOTE — TELEPHONE ENCOUNTER
----- Message from Anibal Sanchez sent at 3/22/2023  1:48 PM CDT -----  Regarding: Pt Advice  Pt called stating that Dr. Conteh spoke with her on her last visit with Dr. Oviedo and she would like to speak with him she had a few questions      Contact Audie [phone]

## 2023-03-22 NOTE — TELEPHONE ENCOUNTER
Called patient at 3:50 pm. She expresses concerns with starting imatinib. Discussed possible side effects as well as the more common side effects that are transient. Questions and concerns addressed.

## 2023-03-29 ENCOUNTER — PATIENT MESSAGE (OUTPATIENT)
Dept: HEMATOLOGY/ONCOLOGY | Facility: CLINIC | Age: 48
End: 2023-03-29

## 2023-03-29 ENCOUNTER — DOCUMENTATION ONLY (OUTPATIENT)
Dept: HEMATOLOGY/ONCOLOGY | Facility: CLINIC | Age: 48
End: 2023-03-29

## 2023-03-29 ENCOUNTER — LAB VISIT (OUTPATIENT)
Dept: LAB | Facility: HOSPITAL | Age: 48
End: 2023-03-29

## 2023-03-29 DIAGNOSIS — C92.10 CML (CHRONIC MYELOCYTIC LEUKEMIA): ICD-10-CM

## 2023-03-29 LAB
ALBUMIN SERPL BCP-MCNC: 3.9 G/DL (ref 3.5–5.2)
ALP SERPL-CCNC: 104 U/L (ref 55–135)
ALT SERPL W/O P-5'-P-CCNC: 37 U/L (ref 10–44)
ANION GAP SERPL CALC-SCNC: 8 MMOL/L (ref 8–16)
ANISOCYTOSIS BLD QL SMEAR: SLIGHT
AST SERPL-CCNC: 37 U/L (ref 10–40)
BASO STIPL BLD QL SMEAR: ABNORMAL
BASOPHILS NFR BLD: 3 % (ref 0–1.9)
BILIRUB SERPL-MCNC: 0.4 MG/DL (ref 0.1–1)
BLASTS NFR BLD MANUAL: 6 %
BUN SERPL-MCNC: 12 MG/DL (ref 6–20)
CALCIUM SERPL-MCNC: 9.7 MG/DL (ref 8.7–10.5)
CHLORIDE SERPL-SCNC: 104 MMOL/L (ref 95–110)
CO2 SERPL-SCNC: 26 MMOL/L (ref 23–29)
CREAT SERPL-MCNC: 0.8 MG/DL (ref 0.5–1.4)
DIFFERENTIAL METHOD: ABNORMAL
DOHLE BOD BLD QL SMEAR: PRESENT
EOSINOPHIL NFR BLD: 2 % (ref 0–8)
ERYTHROCYTE [DISTWIDTH] IN BLOOD BY AUTOMATED COUNT: 17.4 % (ref 11.5–14.5)
EST. GFR  (NO RACE VARIABLE): >60 ML/MIN/1.73 M^2
GLUCOSE SERPL-MCNC: 99 MG/DL (ref 70–110)
HCT VFR BLD AUTO: 31.1 % (ref 37–48.5)
HGB BLD-MCNC: 9.6 G/DL (ref 12–16)
HYPOCHROMIA BLD QL SMEAR: ABNORMAL
IMM GRANULOCYTES # BLD AUTO: ABNORMAL K/UL (ref 0–0.04)
IMM GRANULOCYTES NFR BLD AUTO: ABNORMAL % (ref 0–0.5)
LYMPHOCYTES NFR BLD: 5 % (ref 18–48)
MCH RBC QN AUTO: 29.4 PG (ref 27–31)
MCHC RBC AUTO-ENTMCNC: 30.9 G/DL (ref 32–36)
MCV RBC AUTO: 95 FL (ref 82–98)
METAMYELOCYTES NFR BLD MANUAL: 5 %
MONOCYTES NFR BLD: 2 % (ref 4–15)
MYELOCYTES NFR BLD MANUAL: 5 %
NEUTROPHILS NFR BLD: 64 % (ref 38–73)
NEUTS BAND NFR BLD MANUAL: 7 %
NRBC BLD-RTO: 2 /100 WBC
PLATELET # BLD AUTO: 159 K/UL (ref 150–450)
PLATELET BLD QL SMEAR: ABNORMAL
PMV BLD AUTO: 11.8 FL (ref 9.2–12.9)
POLYCHROMASIA BLD QL SMEAR: ABNORMAL
POTASSIUM SERPL-SCNC: 4.2 MMOL/L (ref 3.5–5.1)
PROMYELOCYTES NFR BLD MANUAL: 1 %
PROT SERPL-MCNC: 7.6 G/DL (ref 6–8.4)
RBC # BLD AUTO: 3.27 M/UL (ref 4–5.4)
SODIUM SERPL-SCNC: 138 MMOL/L (ref 136–145)
WBC # BLD AUTO: 134.5 K/UL (ref 3.9–12.7)

## 2023-03-29 PROCEDURE — 36415 COLL VENOUS BLD VENIPUNCTURE: CPT | Mod: PN | Performed by: STUDENT IN AN ORGANIZED HEALTH CARE EDUCATION/TRAINING PROGRAM

## 2023-03-29 PROCEDURE — 85007 BL SMEAR W/DIFF WBC COUNT: CPT | Performed by: STUDENT IN AN ORGANIZED HEALTH CARE EDUCATION/TRAINING PROGRAM

## 2023-03-29 PROCEDURE — 85060 BLOOD SMEAR INTERPRETATION: CPT | Mod: ,,, | Performed by: PATHOLOGY

## 2023-03-29 PROCEDURE — 85027 COMPLETE CBC AUTOMATED: CPT | Performed by: STUDENT IN AN ORGANIZED HEALTH CARE EDUCATION/TRAINING PROGRAM

## 2023-03-29 PROCEDURE — 80053 COMPREHEN METABOLIC PANEL: CPT | Performed by: STUDENT IN AN ORGANIZED HEALTH CARE EDUCATION/TRAINING PROGRAM

## 2023-03-29 PROCEDURE — 85060 PATHOLOGIST REVIEW: ICD-10-PCS | Mod: ,,, | Performed by: PATHOLOGY

## 2023-03-30 LAB — PATH REV BLD -IMP: NORMAL

## 2023-04-05 ENCOUNTER — LAB VISIT (OUTPATIENT)
Dept: LAB | Facility: HOSPITAL | Age: 48
End: 2023-04-05

## 2023-04-05 DIAGNOSIS — C92.10 CML (CHRONIC MYELOCYTIC LEUKEMIA): ICD-10-CM

## 2023-04-05 LAB
ALBUMIN SERPL BCP-MCNC: 3.6 G/DL (ref 3.5–5.2)
ALP SERPL-CCNC: 95 U/L (ref 55–135)
ALT SERPL W/O P-5'-P-CCNC: 23 U/L (ref 10–44)
ANION GAP SERPL CALC-SCNC: 10 MMOL/L (ref 8–16)
ANISOCYTOSIS BLD QL SMEAR: SLIGHT
AST SERPL-CCNC: 20 U/L (ref 10–40)
BASOPHILS NFR BLD: 4 % (ref 0–1.9)
BILIRUB SERPL-MCNC: 0.4 MG/DL (ref 0.1–1)
BUN SERPL-MCNC: 9 MG/DL (ref 6–20)
CALCIUM SERPL-MCNC: 9.6 MG/DL (ref 8.7–10.5)
CHLORIDE SERPL-SCNC: 102 MMOL/L (ref 95–110)
CO2 SERPL-SCNC: 26 MMOL/L (ref 23–29)
CREAT SERPL-MCNC: 0.9 MG/DL (ref 0.5–1.4)
DIFFERENTIAL METHOD: ABNORMAL
EOSINOPHIL NFR BLD: 0 % (ref 0–8)
ERYTHROCYTE [DISTWIDTH] IN BLOOD BY AUTOMATED COUNT: 17.3 % (ref 11.5–14.5)
EST. GFR  (NO RACE VARIABLE): >60 ML/MIN/1.73 M^2
GLUCOSE SERPL-MCNC: 98 MG/DL (ref 70–110)
HCT VFR BLD AUTO: 31.1 % (ref 37–48.5)
HGB BLD-MCNC: 9.6 G/DL (ref 12–16)
IMM GRANULOCYTES # BLD AUTO: ABNORMAL K/UL (ref 0–0.04)
IMM GRANULOCYTES NFR BLD AUTO: ABNORMAL % (ref 0–0.5)
LYMPHOCYTES NFR BLD: 8 % (ref 18–48)
MCH RBC QN AUTO: 29.5 PG (ref 27–31)
MCHC RBC AUTO-ENTMCNC: 30.9 G/DL (ref 32–36)
MCV RBC AUTO: 96 FL (ref 82–98)
METAMYELOCYTES NFR BLD MANUAL: 3 %
MONOCYTES NFR BLD: 2 % (ref 4–15)
MYELOCYTES NFR BLD MANUAL: 6 %
NEUTROPHILS NFR BLD: 66 % (ref 38–73)
NEUTS BAND NFR BLD MANUAL: 11 %
NRBC BLD-RTO: 0 /100 WBC
PLATELET # BLD AUTO: 127 K/UL (ref 150–450)
PLATELET BLD QL SMEAR: ABNORMAL
PMV BLD AUTO: 12.5 FL (ref 9.2–12.9)
POLYCHROMASIA BLD QL SMEAR: ABNORMAL
POTASSIUM SERPL-SCNC: 4 MMOL/L (ref 3.5–5.1)
PROT SERPL-MCNC: 7.1 G/DL (ref 6–8.4)
RBC # BLD AUTO: 3.25 M/UL (ref 4–5.4)
SODIUM SERPL-SCNC: 138 MMOL/L (ref 136–145)
WBC # BLD AUTO: 96.01 K/UL (ref 3.9–12.7)

## 2023-04-05 PROCEDURE — 36415 COLL VENOUS BLD VENIPUNCTURE: CPT | Mod: PN | Performed by: STUDENT IN AN ORGANIZED HEALTH CARE EDUCATION/TRAINING PROGRAM

## 2023-04-05 PROCEDURE — 85027 COMPLETE CBC AUTOMATED: CPT | Performed by: STUDENT IN AN ORGANIZED HEALTH CARE EDUCATION/TRAINING PROGRAM

## 2023-04-05 PROCEDURE — 85007 BL SMEAR W/DIFF WBC COUNT: CPT | Performed by: STUDENT IN AN ORGANIZED HEALTH CARE EDUCATION/TRAINING PROGRAM

## 2023-04-05 PROCEDURE — 80053 COMPREHEN METABOLIC PANEL: CPT | Performed by: STUDENT IN AN ORGANIZED HEALTH CARE EDUCATION/TRAINING PROGRAM

## 2023-04-06 ENCOUNTER — PATIENT MESSAGE (OUTPATIENT)
Dept: HEMATOLOGY/ONCOLOGY | Facility: CLINIC | Age: 48
End: 2023-04-06

## 2023-04-12 ENCOUNTER — LAB VISIT (OUTPATIENT)
Dept: LAB | Facility: HOSPITAL | Age: 48
End: 2023-04-12

## 2023-04-12 DIAGNOSIS — C92.10 CML (CHRONIC MYELOCYTIC LEUKEMIA): ICD-10-CM

## 2023-04-12 LAB
ALBUMIN SERPL BCP-MCNC: 3.7 G/DL (ref 3.5–5.2)
ALP SERPL-CCNC: 97 U/L (ref 55–135)
ALT SERPL W/O P-5'-P-CCNC: 15 U/L (ref 10–44)
ANION GAP SERPL CALC-SCNC: 10 MMOL/L (ref 8–16)
ANISOCYTOSIS BLD QL SMEAR: SLIGHT
AST SERPL-CCNC: 16 U/L (ref 10–40)
BASOPHILS # BLD AUTO: ABNORMAL K/UL (ref 0–0.2)
BASOPHILS NFR BLD: 1 % (ref 0–1.9)
BILIRUB SERPL-MCNC: 0.5 MG/DL (ref 0.1–1)
BLASTS NFR BLD MANUAL: 3 %
BUN SERPL-MCNC: 10 MG/DL (ref 6–20)
CALCIUM SERPL-MCNC: 9.6 MG/DL (ref 8.7–10.5)
CHLORIDE SERPL-SCNC: 106 MMOL/L (ref 95–110)
CO2 SERPL-SCNC: 23 MMOL/L (ref 23–29)
CREAT SERPL-MCNC: 0.8 MG/DL (ref 0.5–1.4)
DIFFERENTIAL METHOD: ABNORMAL
EOSINOPHIL # BLD AUTO: ABNORMAL K/UL (ref 0–0.5)
EOSINOPHIL NFR BLD: 0 % (ref 0–8)
ERYTHROCYTE [DISTWIDTH] IN BLOOD BY AUTOMATED COUNT: 17.9 % (ref 11.5–14.5)
EST. GFR  (NO RACE VARIABLE): >60 ML/MIN/1.73 M^2
GLUCOSE SERPL-MCNC: 91 MG/DL (ref 70–110)
HCT VFR BLD AUTO: 30.9 % (ref 37–48.5)
HGB BLD-MCNC: 9.5 G/DL (ref 12–16)
IMM GRANULOCYTES # BLD AUTO: ABNORMAL K/UL (ref 0–0.04)
IMM GRANULOCYTES NFR BLD AUTO: ABNORMAL % (ref 0–0.5)
LYMPHOCYTES # BLD AUTO: ABNORMAL K/UL (ref 1–4.8)
LYMPHOCYTES NFR BLD: 4 % (ref 18–48)
MCH RBC QN AUTO: 29 PG (ref 27–31)
MCHC RBC AUTO-ENTMCNC: 30.7 G/DL (ref 32–36)
MCV RBC AUTO: 94 FL (ref 82–98)
METAMYELOCYTES NFR BLD MANUAL: 3 %
MONOCYTES # BLD AUTO: ABNORMAL K/UL (ref 0.3–1)
MONOCYTES NFR BLD: 5 % (ref 4–15)
MYELOCYTES NFR BLD MANUAL: 2 %
NEUTROPHILS NFR BLD: 76 % (ref 38–73)
NEUTS BAND NFR BLD MANUAL: 6 %
NRBC BLD-RTO: 1 /100 WBC
PLATELET # BLD AUTO: 109 K/UL (ref 150–450)
PLATELET BLD QL SMEAR: ABNORMAL
PMV BLD AUTO: 11.3 FL (ref 9.2–12.9)
POTASSIUM SERPL-SCNC: 4.5 MMOL/L (ref 3.5–5.1)
PROT SERPL-MCNC: 7.3 G/DL (ref 6–8.4)
RBC # BLD AUTO: 3.28 M/UL (ref 4–5.4)
SODIUM SERPL-SCNC: 139 MMOL/L (ref 136–145)
WBC # BLD AUTO: 48.23 K/UL (ref 3.9–12.7)

## 2023-04-12 PROCEDURE — 85027 COMPLETE CBC AUTOMATED: CPT | Performed by: STUDENT IN AN ORGANIZED HEALTH CARE EDUCATION/TRAINING PROGRAM

## 2023-04-12 PROCEDURE — 80053 COMPREHEN METABOLIC PANEL: CPT | Performed by: STUDENT IN AN ORGANIZED HEALTH CARE EDUCATION/TRAINING PROGRAM

## 2023-04-12 PROCEDURE — 85007 BL SMEAR W/DIFF WBC COUNT: CPT | Performed by: STUDENT IN AN ORGANIZED HEALTH CARE EDUCATION/TRAINING PROGRAM

## 2023-04-12 PROCEDURE — 36415 COLL VENOUS BLD VENIPUNCTURE: CPT | Mod: PN | Performed by: STUDENT IN AN ORGANIZED HEALTH CARE EDUCATION/TRAINING PROGRAM

## 2023-04-19 ENCOUNTER — LAB VISIT (OUTPATIENT)
Dept: LAB | Facility: HOSPITAL | Age: 48
End: 2023-04-19

## 2023-04-19 DIAGNOSIS — C92.10 CML (CHRONIC MYELOCYTIC LEUKEMIA): ICD-10-CM

## 2023-04-19 LAB
ALBUMIN SERPL BCP-MCNC: 3.5 G/DL (ref 3.5–5.2)
ALP SERPL-CCNC: 93 U/L (ref 55–135)
ALT SERPL W/O P-5'-P-CCNC: 19 U/L (ref 10–44)
ANION GAP SERPL CALC-SCNC: 9 MMOL/L (ref 8–16)
ANISOCYTOSIS BLD QL SMEAR: SLIGHT
AST SERPL-CCNC: 20 U/L (ref 10–40)
BASOPHILS NFR BLD: 1 % (ref 0–1.9)
BILIRUB SERPL-MCNC: 0.4 MG/DL (ref 0.1–1)
BUN SERPL-MCNC: 13 MG/DL (ref 6–20)
CALCIUM SERPL-MCNC: 9.1 MG/DL (ref 8.7–10.5)
CHLORIDE SERPL-SCNC: 105 MMOL/L (ref 95–110)
CO2 SERPL-SCNC: 25 MMOL/L (ref 23–29)
CREAT SERPL-MCNC: 0.8 MG/DL (ref 0.5–1.4)
DIFFERENTIAL METHOD: ABNORMAL
EOSINOPHIL NFR BLD: 1 % (ref 0–8)
ERYTHROCYTE [DISTWIDTH] IN BLOOD BY AUTOMATED COUNT: 18.2 % (ref 11.5–14.5)
EST. GFR  (NO RACE VARIABLE): >60 ML/MIN/1.73 M^2
GLUCOSE SERPL-MCNC: 81 MG/DL (ref 70–110)
HCT VFR BLD AUTO: 30.5 % (ref 37–48.5)
HGB BLD-MCNC: 9.3 G/DL (ref 12–16)
IMM GRANULOCYTES # BLD AUTO: ABNORMAL K/UL (ref 0–0.04)
IMM GRANULOCYTES NFR BLD AUTO: ABNORMAL % (ref 0–0.5)
LYMPHOCYTES NFR BLD: 21 % (ref 18–48)
MCH RBC QN AUTO: 29.2 PG (ref 27–31)
MCHC RBC AUTO-ENTMCNC: 30.5 G/DL (ref 32–36)
MCV RBC AUTO: 96 FL (ref 82–98)
METAMYELOCYTES NFR BLD MANUAL: 2 %
MONOCYTES NFR BLD: 2 % (ref 4–15)
NEUTROPHILS NFR BLD: 69 % (ref 38–73)
NEUTS BAND NFR BLD MANUAL: 4 %
NRBC BLD-RTO: 3 /100 WBC
PLATELET # BLD AUTO: 129 K/UL (ref 150–450)
PLATELET BLD QL SMEAR: ABNORMAL
PMV BLD AUTO: 10.9 FL (ref 9.2–12.9)
POLYCHROMASIA BLD QL SMEAR: ABNORMAL
POTASSIUM SERPL-SCNC: 4.1 MMOL/L (ref 3.5–5.1)
PROT SERPL-MCNC: 6.7 G/DL (ref 6–8.4)
RBC # BLD AUTO: 3.18 M/UL (ref 4–5.4)
SODIUM SERPL-SCNC: 139 MMOL/L (ref 136–145)
URATE SERPL-MCNC: 4.9 MG/DL (ref 2.4–5.7)
WBC # BLD AUTO: 8.83 K/UL (ref 3.9–12.7)

## 2023-04-19 PROCEDURE — 36415 COLL VENOUS BLD VENIPUNCTURE: CPT | Mod: PN | Performed by: STUDENT IN AN ORGANIZED HEALTH CARE EDUCATION/TRAINING PROGRAM

## 2023-04-19 PROCEDURE — 80053 COMPREHEN METABOLIC PANEL: CPT | Performed by: STUDENT IN AN ORGANIZED HEALTH CARE EDUCATION/TRAINING PROGRAM

## 2023-04-19 PROCEDURE — 84550 ASSAY OF BLOOD/URIC ACID: CPT | Performed by: STUDENT IN AN ORGANIZED HEALTH CARE EDUCATION/TRAINING PROGRAM

## 2023-04-19 PROCEDURE — 85007 BL SMEAR W/DIFF WBC COUNT: CPT | Performed by: STUDENT IN AN ORGANIZED HEALTH CARE EDUCATION/TRAINING PROGRAM

## 2023-04-19 PROCEDURE — 85027 COMPLETE CBC AUTOMATED: CPT | Performed by: STUDENT IN AN ORGANIZED HEALTH CARE EDUCATION/TRAINING PROGRAM

## 2023-04-20 ENCOUNTER — OFFICE VISIT (OUTPATIENT)
Dept: HEMATOLOGY/ONCOLOGY | Facility: CLINIC | Age: 48
End: 2023-04-20

## 2023-04-20 VITALS
HEIGHT: 60 IN | DIASTOLIC BLOOD PRESSURE: 75 MMHG | RESPIRATION RATE: 16 BRPM | WEIGHT: 160.63 LBS | SYSTOLIC BLOOD PRESSURE: 139 MMHG | OXYGEN SATURATION: 100 % | HEART RATE: 90 BPM | TEMPERATURE: 98 F | BODY MASS INDEX: 31.54 KG/M2

## 2023-04-20 DIAGNOSIS — C92.10 CML (CHRONIC MYELOCYTIC LEUKEMIA): Primary | ICD-10-CM

## 2023-04-20 DIAGNOSIS — E79.0 ELEVATED URIC ACID IN BLOOD: ICD-10-CM

## 2023-04-20 DIAGNOSIS — I10 ESSENTIAL HYPERTENSION: ICD-10-CM

## 2023-04-20 DIAGNOSIS — D53.9 MACROCYTIC ANEMIA: ICD-10-CM

## 2023-04-20 PROCEDURE — 99999 PR PBB SHADOW E&M-EST. PATIENT-LVL III: ICD-10-PCS | Mod: PBBFAC,,, | Performed by: STUDENT IN AN ORGANIZED HEALTH CARE EDUCATION/TRAINING PROGRAM

## 2023-04-20 PROCEDURE — 99213 OFFICE O/P EST LOW 20 MIN: CPT | Mod: PBBFAC | Performed by: STUDENT IN AN ORGANIZED HEALTH CARE EDUCATION/TRAINING PROGRAM

## 2023-04-20 PROCEDURE — 99999 PR PBB SHADOW E&M-EST. PATIENT-LVL III: CPT | Mod: PBBFAC,,, | Performed by: STUDENT IN AN ORGANIZED HEALTH CARE EDUCATION/TRAINING PROGRAM

## 2023-04-20 PROCEDURE — 99215 PR OFFICE/OUTPT VISIT, EST, LEVL V, 40-54 MIN: ICD-10-PCS | Mod: S$PBB,,, | Performed by: STUDENT IN AN ORGANIZED HEALTH CARE EDUCATION/TRAINING PROGRAM

## 2023-04-20 PROCEDURE — 99215 OFFICE O/P EST HI 40 MIN: CPT | Mod: S$PBB,,, | Performed by: STUDENT IN AN ORGANIZED HEALTH CARE EDUCATION/TRAINING PROGRAM

## 2023-04-20 NOTE — PROGRESS NOTES
BMT Chart Routing      Follow up with physician 2 months. already scheduled   Follow up with MICHELL    Provider visit type    Infusion scheduling note    Injection scheduling note    Labs CBC and CMP   Scheduling:  Preferred lab:  Lab interval:  cbc, cmp every 2 weeks. cbc, cmp, and bcr/abl p210 in 2 months, Already scheduled   Imaging    Pharmacy appointment    Other referrals           HEMATOLOGY ONCOLOGY FELLOW CLINIC    IDENTIFYING STATEMENT   Audie Sandoval (Audie) is a 47 y.o. female with history of hypertension who presents to clinic for further evaluation and management of her leukocytosis    HISTORY OF PRESENT ILLNESS:    Patient initially alerted to her elevated WBC count when she got lab work through [employer] After having blood drawn she received her results about one week later which showed WBC count of 120k. She discussed this with a friend of her's who is a nurse who encouraged her to be evaluated in emergency department. She presented to [facility] main [facility] where hematology team was alerted by ED physician out of concern for acute leukemia. Hematology team evaluated patient's ED lab work. WBC count of 129 and mild anemia with Hg of 11 but no thrombocytopenia. CMP grossly wnl. Elevated LDH to 817, and elevated uric acid to 6.9. Peripheral smear reviewed with findings of arked leukocytosis with a granulocytic predominance.   Granulocytes are left shifted to the myelocyte stage.  Occasional blasts and promyelocytes are present, estimated less than 5%. Basophils are increased. Patient evaluated by hematology at bedside. Physical exam unremarkable and patient denies major systemic symptoms. Likely diagnosis of CML (chronic phase) was made with discharge directly from ED to clinic on 3/2 for further work up and management.     Bone marrow pathology significant for 100% cellularity with myeloid predominance (M/E= 11:1) consistent with chronic myeloid leukemia, bcr-abl 1 positive, chronic phase;  chromosomal analysis pending. BCR/ABL1 p210 mRNA 27%.  --------------------------------------------------------------------------------------------------------------------------------------------------------------------------------------------------  Patient presents to clinic for follow up appointment. She reports that she has been taking imatinib without significant issue. She notes some side effects of generalized fatigue and having come down with a viral illness from her kids a few weeks ago. She takes her medication at night. She denies any other systemic symptoms of headaches, dizziness, SOB, cough, chest pains, abdominal pain, n/v/d, fevers or chills.     Past Medical History:   Diagnosis Date    Allergy     Gestational hypertension        Family History   Problem Relation Age of Onset    Hypertension Mother     Arthritis Mother         DJD spine    Abnormal EKG Father     Diabetes Father        Social History     Socioeconomic History    Marital status:    Tobacco Use    Smoking status: Never    Smokeless tobacco: Never   Substance and Sexual Activity    Alcohol use: Yes     Comment: Rare    Drug use: Never    Sexual activity: Yes     Partners: Male         MEDICATIONS:     Current Outpatient Medications on File Prior to Visit   Medication Sig Dispense Refill    acyclovir (ZOVIRAX) 400 MG tablet Take 400 mg by mouth 2 (two) times daily.      ALBUTEROL, REFILL, INHL       azithromycin (Z-NOHEMY) 250 MG tablet Take 1 tablet (250 mg total) by mouth once daily. Take first 2 tablets together, then 1 every day until finished. 6 tablet 0    imatinib (GLEEVEC) 400 MG Tab Take 1 tablet (400 mg total) by mouth once daily. 30 tablet 11     No current facility-administered medications on file prior to visit.       ALLERGIES:   Review of patient's allergies indicates:   Allergen Reactions    Codeine     Wheat containing prod         ROS:       Review of Systems   Constitutional:  Negative for appetite change, chills,  fatigue, fever and unexpected weight change.   HENT:   Negative for sore throat and trouble swallowing.    Respiratory:  Negative for cough, shortness of breath and wheezing.    Cardiovascular:  Negative for leg swelling and palpitations.   Gastrointestinal:  Negative for abdominal pain, diarrhea and nausea.   Genitourinary:  Negative for dysuria and frequency.    Musculoskeletal:  Negative for arthralgias and myalgias.   Skin:  Negative for rash.   Neurological:  Negative for dizziness, headaches and light-headedness.   Hematological:  Negative for adenopathy. Does not bruise/bleed easily.   Psychiatric/Behavioral:  Negative for confusion and decreased concentration. The patient is nervous/anxious.      PHYSICAL EXAM:  Vitals:    04/20/23 0915   BP: 139/75   Pulse: 90   Resp: 16   Temp: 98.3 °F (36.8 °C)   SpO2: 100%   Weight: 72.8 kg (160 lb 9.7 oz)   Height: 5' (1.524 m)   PainSc: 0-No pain         Physical Exam  Vitals reviewed.   Constitutional:       General: She is not in acute distress.     Appearance: She is well-developed. She is not toxic-appearing.   HENT:      Head: Normocephalic and atraumatic.      Right Ear: External ear normal.      Left Ear: External ear normal.      Nose: Nose normal.      Mouth/Throat:      Mouth: Mucous membranes are moist.      Pharynx: Oropharynx is clear. No oropharyngeal exudate.   Eyes:      General: No scleral icterus.     Extraocular Movements: Extraocular movements intact.      Conjunctiva/sclera: Conjunctivae normal.      Pupils: Pupils are equal, round, and reactive to light.   Cardiovascular:      Rate and Rhythm: Normal rate and regular rhythm.      Heart sounds: Normal heart sounds. No murmur heard.  Pulmonary:      Effort: Pulmonary effort is normal.      Breath sounds: Normal breath sounds. No wheezing or rales.   Abdominal:      General: Bowel sounds are normal. There is no distension.      Palpations: Abdomen is soft.      Tenderness: There is no abdominal  tenderness.   Musculoskeletal:         General: No deformity. Normal range of motion.      Cervical back: Normal range of motion and neck supple.      Right lower leg: No edema.      Left lower leg: No edema.   Lymphadenopathy:      Cervical: No cervical adenopathy.   Skin:     General: Skin is warm and dry.   Neurological:      Mental Status: She is alert and oriented to person, place, and time.   Psychiatric:         Behavior: Behavior normal.       LAB:   Results for orders placed or performed in visit on 04/19/23   CBC W/ AUTO DIFFERENTIAL   Result Value Ref Range    WBC 8.83 3.90 - 12.70 K/uL    RBC 3.18 (L) 4.00 - 5.40 M/uL    Hemoglobin 9.3 (L) 12.0 - 16.0 g/dL    Hematocrit 30.5 (L) 37.0 - 48.5 %    MCV 96 82 - 98 fL    MCH 29.2 27.0 - 31.0 pg    MCHC 30.5 (L) 32.0 - 36.0 g/dL    RDW 18.2 (H) 11.5 - 14.5 %    Platelets 129 (L) 150 - 450 K/uL    MPV 10.9 9.2 - 12.9 fL    Immature Granulocytes CANCELED 0.0 - 0.5 %    Immature Grans (Abs) CANCELED 0.00 - 0.04 K/uL    nRBC 3 (A) 0 /100 WBC    Gran % 69.0 38.0 - 73.0 %    Lymph % 21.0 18.0 - 48.0 %    Mono % 2.0 (L) 4.0 - 15.0 %    Eosinophil % 1.0 0.0 - 8.0 %    Basophil % 1.0 0.0 - 1.9 %    Bands 4.0 %    Metamyelocytes 2.0 %    Platelet Estimate Decreased (A)     Aniso Slight     Poly Occasional     Differential Method Manual    COMPREHENSIVE METABOLIC PANEL   Result Value Ref Range    Sodium 139 136 - 145 mmol/L    Potassium 4.1 3.5 - 5.1 mmol/L    Chloride 105 95 - 110 mmol/L    CO2 25 23 - 29 mmol/L    Glucose 81 70 - 110 mg/dL    BUN 13 6 - 20 mg/dL    Creatinine 0.8 0.5 - 1.4 mg/dL    Calcium 9.1 8.7 - 10.5 mg/dL    Total Protein 6.7 6.0 - 8.4 g/dL    Albumin 3.5 3.5 - 5.2 g/dL    Total Bilirubin 0.4 0.1 - 1.0 mg/dL    Alkaline Phosphatase 93 55 - 135 U/L    AST 20 10 - 40 U/L    ALT 19 10 - 44 U/L    Anion Gap 9 8 - 16 mmol/L    eGFR >60.0 >60 mL/min/1.73 m^2   URIC ACID   Result Value Ref Range    Uric Acid 4.9 2.4 - 5.7 mg/dL       PROBLEMS ASSESSED  THIS VISIT:    1. CML (chronic myelocytic leukemia)    2. Elevated uric acid in blood    3. Macrocytic anemia          ASSESSMENT AND PLAN    1  47 year old female with minimal past medical history who presented to hospital with elevated WBC count. WBC count of 129 and mild anemia with Hg of 11 but no thrombocytopenia. CMP grossly wnl. Elevated LDH to 817, and elevated uric acid to 6.9. Peripheral smear reviewed with findings of arked leukocytosis with a granulocytic predominance. Granulocytes are left shifted to the myelocyte stage.  Occasional blasts and promyelocytes are present, estimated less than 5%. Basophils are increased. Her presentation, initial lab work and clinical presentation indicative of CML chronic phase.     Bone marrow biopsy done on 3/08. Bone marrow pathology significant for 100% cellularity with myeloid predominance (M/E= 11:1) consistent with chronic myeloid leukemia, bcr-abl 1 positive, chronic phase; chromosomal analysis pending. BCR/ABL1 p210 mRNA 27%. Low risk per Sokal score    - Currently taking imatinib 400 mg daily. Continue to monitor for possible side effects (fluid retention, nausea, etc)  - Will get weekly labs (cbc, cmp) for the first month, q2 week labs in the second month prior to spacing out labs to monthly. Recheck bcr-abl in 3 months  - Follow up with me in person in 2 month  - BCR-ABL peripheral blood 30% on 3/14  - Hep B studies reviewed, negative    2  - Uric acid 6.9 on initial labs, repeat shows uric acid of 5.6, currently 4.9  - Repeat uric acid with labs in 1 month    3  - Continues to have macrocytic anemia. Patient is self pay so labs not previously drawn  - will add iron studies, b12, and folate when able     Discussed with Dr. Jared Oviedo MD  PGY-IV  Hematology/Oncology Fellow

## 2023-04-21 ENCOUNTER — PATIENT MESSAGE (OUTPATIENT)
Dept: ADMINISTRATIVE | Facility: HOSPITAL | Age: 48
End: 2023-04-21

## 2023-05-03 ENCOUNTER — PATIENT MESSAGE (OUTPATIENT)
Dept: HEMATOLOGY/ONCOLOGY | Facility: CLINIC | Age: 48
End: 2023-05-03

## 2023-05-03 ENCOUNTER — LAB VISIT (OUTPATIENT)
Dept: LAB | Facility: HOSPITAL | Age: 48
End: 2023-05-03

## 2023-05-03 DIAGNOSIS — C92.10 CML (CHRONIC MYELOCYTIC LEUKEMIA): ICD-10-CM

## 2023-05-03 LAB
ALBUMIN SERPL BCP-MCNC: 3.4 G/DL (ref 3.5–5.2)
ALP SERPL-CCNC: 98 U/L (ref 55–135)
ALT SERPL W/O P-5'-P-CCNC: 18 U/L (ref 10–44)
ANION GAP SERPL CALC-SCNC: 5 MMOL/L (ref 8–16)
AST SERPL-CCNC: 14 U/L (ref 10–40)
BASOPHILS # BLD AUTO: 0.09 K/UL (ref 0–0.2)
BASOPHILS NFR BLD: 2.1 % (ref 0–1.9)
BILIRUB SERPL-MCNC: 0.4 MG/DL (ref 0.1–1)
BUN SERPL-MCNC: 13 MG/DL (ref 6–20)
CALCIUM SERPL-MCNC: 9 MG/DL (ref 8.7–10.5)
CHLORIDE SERPL-SCNC: 107 MMOL/L (ref 95–110)
CO2 SERPL-SCNC: 25 MMOL/L (ref 23–29)
CREAT SERPL-MCNC: 0.7 MG/DL (ref 0.5–1.4)
DIFFERENTIAL METHOD: ABNORMAL
EOSINOPHIL # BLD AUTO: 0 K/UL (ref 0–0.5)
EOSINOPHIL NFR BLD: 0.9 % (ref 0–8)
ERYTHROCYTE [DISTWIDTH] IN BLOOD BY AUTOMATED COUNT: 17.5 % (ref 11.5–14.5)
EST. GFR  (NO RACE VARIABLE): >60 ML/MIN/1.73 M^2
GLUCOSE SERPL-MCNC: 104 MG/DL (ref 70–110)
HCT VFR BLD AUTO: 29.6 % (ref 37–48.5)
HGB BLD-MCNC: 9.2 G/DL (ref 12–16)
IMM GRANULOCYTES # BLD AUTO: 0.06 K/UL (ref 0–0.04)
IMM GRANULOCYTES NFR BLD AUTO: 1.4 % (ref 0–0.5)
LYMPHOCYTES # BLD AUTO: 1 K/UL (ref 1–4.8)
LYMPHOCYTES NFR BLD: 22.8 % (ref 18–48)
MCH RBC QN AUTO: 29.7 PG (ref 27–31)
MCHC RBC AUTO-ENTMCNC: 31.1 G/DL (ref 32–36)
MCV RBC AUTO: 96 FL (ref 82–98)
MONOCYTES # BLD AUTO: 0.1 K/UL (ref 0.3–1)
MONOCYTES NFR BLD: 2.1 % (ref 4–15)
NEUTROPHILS # BLD AUTO: 3.1 K/UL (ref 1.8–7.7)
NEUTROPHILS NFR BLD: 70.7 % (ref 38–73)
NRBC BLD-RTO: 0 /100 WBC
PLATELET # BLD AUTO: 93 K/UL (ref 150–450)
PMV BLD AUTO: 10.5 FL (ref 9.2–12.9)
POTASSIUM SERPL-SCNC: 4 MMOL/L (ref 3.5–5.1)
PROT SERPL-MCNC: 6.4 G/DL (ref 6–8.4)
RBC # BLD AUTO: 3.1 M/UL (ref 4–5.4)
SODIUM SERPL-SCNC: 137 MMOL/L (ref 136–145)
WBC # BLD AUTO: 4.39 K/UL (ref 3.9–12.7)

## 2023-05-03 PROCEDURE — 36415 COLL VENOUS BLD VENIPUNCTURE: CPT | Mod: PN | Performed by: STUDENT IN AN ORGANIZED HEALTH CARE EDUCATION/TRAINING PROGRAM

## 2023-05-03 PROCEDURE — 85025 COMPLETE CBC W/AUTO DIFF WBC: CPT | Performed by: STUDENT IN AN ORGANIZED HEALTH CARE EDUCATION/TRAINING PROGRAM

## 2023-05-03 PROCEDURE — 80053 COMPREHEN METABOLIC PANEL: CPT | Performed by: STUDENT IN AN ORGANIZED HEALTH CARE EDUCATION/TRAINING PROGRAM

## 2023-05-11 DIAGNOSIS — C92.10 CML (CHRONIC MYELOCYTIC LEUKEMIA): ICD-10-CM

## 2023-05-11 DIAGNOSIS — D53.9 MACROCYTIC ANEMIA: Primary | ICD-10-CM

## 2023-05-17 ENCOUNTER — LAB VISIT (OUTPATIENT)
Dept: LAB | Facility: HOSPITAL | Age: 48
End: 2023-05-17

## 2023-05-17 DIAGNOSIS — D53.9 MACROCYTIC ANEMIA: ICD-10-CM

## 2023-05-17 DIAGNOSIS — C92.10 CML (CHRONIC MYELOCYTIC LEUKEMIA): ICD-10-CM

## 2023-05-17 LAB
ALBUMIN SERPL BCP-MCNC: 3.7 G/DL (ref 3.5–5.2)
ALP SERPL-CCNC: 95 U/L (ref 55–135)
ALT SERPL W/O P-5'-P-CCNC: 18 U/L (ref 10–44)
ANION GAP SERPL CALC-SCNC: 6 MMOL/L (ref 8–16)
AST SERPL-CCNC: 19 U/L (ref 10–40)
BASOPHILS # BLD AUTO: 0.05 K/UL (ref 0–0.2)
BASOPHILS NFR BLD: 1.2 % (ref 0–1.9)
BILIRUB SERPL-MCNC: 0.4 MG/DL (ref 0.1–1)
BUN SERPL-MCNC: 11 MG/DL (ref 6–20)
CALCIUM SERPL-MCNC: 8.9 MG/DL (ref 8.7–10.5)
CHLORIDE SERPL-SCNC: 109 MMOL/L (ref 95–110)
CO2 SERPL-SCNC: 25 MMOL/L (ref 23–29)
CREAT SERPL-MCNC: 0.7 MG/DL (ref 0.5–1.4)
DIFFERENTIAL METHOD: ABNORMAL
EOSINOPHIL # BLD AUTO: 0.1 K/UL (ref 0–0.5)
EOSINOPHIL NFR BLD: 1.2 % (ref 0–8)
ERYTHROCYTE [DISTWIDTH] IN BLOOD BY AUTOMATED COUNT: 16.1 % (ref 11.5–14.5)
EST. GFR  (NO RACE VARIABLE): >60 ML/MIN/1.73 M^2
FERRITIN SERPL-MCNC: 17 NG/ML (ref 20–300)
GLUCOSE SERPL-MCNC: 82 MG/DL (ref 70–110)
HCT VFR BLD AUTO: 30.2 % (ref 37–48.5)
HGB BLD-MCNC: 9.1 G/DL (ref 12–16)
IMM GRANULOCYTES # BLD AUTO: 0.02 K/UL (ref 0–0.04)
IMM GRANULOCYTES NFR BLD AUTO: 0.5 % (ref 0–0.5)
IRON SERPL-MCNC: 51 UG/DL (ref 30–160)
LYMPHOCYTES # BLD AUTO: 1.3 K/UL (ref 1–4.8)
LYMPHOCYTES NFR BLD: 30.6 % (ref 18–48)
MCH RBC QN AUTO: 28.7 PG (ref 27–31)
MCHC RBC AUTO-ENTMCNC: 30.1 G/DL (ref 32–36)
MCV RBC AUTO: 95 FL (ref 82–98)
MONOCYTES # BLD AUTO: 0.1 K/UL (ref 0.3–1)
MONOCYTES NFR BLD: 3.4 % (ref 4–15)
NEUTROPHILS # BLD AUTO: 2.6 K/UL (ref 1.8–7.7)
NEUTROPHILS NFR BLD: 63.1 % (ref 38–73)
NRBC BLD-RTO: 0 /100 WBC
PLATELET # BLD AUTO: 143 K/UL (ref 150–450)
PMV BLD AUTO: 10.5 FL (ref 9.2–12.9)
POTASSIUM SERPL-SCNC: 4.1 MMOL/L (ref 3.5–5.1)
PROT SERPL-MCNC: 6.5 G/DL (ref 6–8.4)
RBC # BLD AUTO: 3.17 M/UL (ref 4–5.4)
SATURATED IRON: 14 % (ref 20–50)
SODIUM SERPL-SCNC: 140 MMOL/L (ref 136–145)
TOTAL IRON BINDING CAPACITY: 370 UG/DL (ref 250–450)
TRANSFERRIN SERPL-MCNC: 250 MG/DL (ref 200–375)
WBC # BLD AUTO: 4.09 K/UL (ref 3.9–12.7)

## 2023-05-17 PROCEDURE — 85025 COMPLETE CBC W/AUTO DIFF WBC: CPT | Performed by: STUDENT IN AN ORGANIZED HEALTH CARE EDUCATION/TRAINING PROGRAM

## 2023-05-17 PROCEDURE — 84466 ASSAY OF TRANSFERRIN: CPT | Performed by: STUDENT IN AN ORGANIZED HEALTH CARE EDUCATION/TRAINING PROGRAM

## 2023-05-17 PROCEDURE — 82652 VIT D 1 25-DIHYDROXY: CPT | Performed by: STUDENT IN AN ORGANIZED HEALTH CARE EDUCATION/TRAINING PROGRAM

## 2023-05-17 PROCEDURE — 82728 ASSAY OF FERRITIN: CPT | Performed by: STUDENT IN AN ORGANIZED HEALTH CARE EDUCATION/TRAINING PROGRAM

## 2023-05-17 PROCEDURE — 80053 COMPREHEN METABOLIC PANEL: CPT | Performed by: STUDENT IN AN ORGANIZED HEALTH CARE EDUCATION/TRAINING PROGRAM

## 2023-05-17 PROCEDURE — 36415 COLL VENOUS BLD VENIPUNCTURE: CPT | Mod: PN | Performed by: STUDENT IN AN ORGANIZED HEALTH CARE EDUCATION/TRAINING PROGRAM

## 2023-05-22 LAB — 1,25(OH)2D3 SERPL-MCNC: 55 PG/ML (ref 20–79)

## 2023-05-31 ENCOUNTER — LAB VISIT (OUTPATIENT)
Dept: LAB | Facility: HOSPITAL | Age: 48
End: 2023-05-31

## 2023-05-31 DIAGNOSIS — C92.10 CML (CHRONIC MYELOCYTIC LEUKEMIA): ICD-10-CM

## 2023-05-31 LAB
ALBUMIN SERPL BCP-MCNC: 3.6 G/DL (ref 3.5–5.2)
ALP SERPL-CCNC: 95 U/L (ref 55–135)
ALT SERPL W/O P-5'-P-CCNC: 17 U/L (ref 10–44)
ANION GAP SERPL CALC-SCNC: 7 MMOL/L (ref 8–16)
AST SERPL-CCNC: 18 U/L (ref 10–40)
BASOPHILS # BLD AUTO: 0.02 K/UL (ref 0–0.2)
BASOPHILS NFR BLD: 0.5 % (ref 0–1.9)
BILIRUB SERPL-MCNC: 0.4 MG/DL (ref 0.1–1)
BUN SERPL-MCNC: 10 MG/DL (ref 6–20)
CALCIUM SERPL-MCNC: 8.7 MG/DL (ref 8.7–10.5)
CHLORIDE SERPL-SCNC: 106 MMOL/L (ref 95–110)
CO2 SERPL-SCNC: 25 MMOL/L (ref 23–29)
CREAT SERPL-MCNC: 0.8 MG/DL (ref 0.5–1.4)
DIFFERENTIAL METHOD: ABNORMAL
EOSINOPHIL # BLD AUTO: 0 K/UL (ref 0–0.5)
EOSINOPHIL NFR BLD: 0.5 % (ref 0–8)
ERYTHROCYTE [DISTWIDTH] IN BLOOD BY AUTOMATED COUNT: 15.9 % (ref 11.5–14.5)
EST. GFR  (NO RACE VARIABLE): >60 ML/MIN/1.73 M^2
GLUCOSE SERPL-MCNC: 79 MG/DL (ref 70–110)
HCT VFR BLD AUTO: 31.8 % (ref 37–48.5)
HGB BLD-MCNC: 9.8 G/DL (ref 12–16)
IMM GRANULOCYTES # BLD AUTO: 0.01 K/UL (ref 0–0.04)
IMM GRANULOCYTES NFR BLD AUTO: 0.2 % (ref 0–0.5)
LYMPHOCYTES # BLD AUTO: 0.9 K/UL (ref 1–4.8)
LYMPHOCYTES NFR BLD: 21 % (ref 18–48)
MCH RBC QN AUTO: 29.5 PG (ref 27–31)
MCHC RBC AUTO-ENTMCNC: 30.8 G/DL (ref 32–36)
MCV RBC AUTO: 96 FL (ref 82–98)
MONOCYTES # BLD AUTO: 0.2 K/UL (ref 0.3–1)
MONOCYTES NFR BLD: 4 % (ref 4–15)
NEUTROPHILS # BLD AUTO: 3.1 K/UL (ref 1.8–7.7)
NEUTROPHILS NFR BLD: 73.8 % (ref 38–73)
NRBC BLD-RTO: 0 /100 WBC
PLATELET # BLD AUTO: 135 K/UL (ref 150–450)
PMV BLD AUTO: 11.1 FL (ref 9.2–12.9)
POTASSIUM SERPL-SCNC: 4.3 MMOL/L (ref 3.5–5.1)
PROT SERPL-MCNC: 6.5 G/DL (ref 6–8.4)
RBC # BLD AUTO: 3.32 M/UL (ref 4–5.4)
SODIUM SERPL-SCNC: 138 MMOL/L (ref 136–145)
WBC # BLD AUTO: 4.23 K/UL (ref 3.9–12.7)

## 2023-05-31 PROCEDURE — 80053 COMPREHEN METABOLIC PANEL: CPT | Performed by: STUDENT IN AN ORGANIZED HEALTH CARE EDUCATION/TRAINING PROGRAM

## 2023-05-31 PROCEDURE — 85025 COMPLETE CBC W/AUTO DIFF WBC: CPT | Performed by: STUDENT IN AN ORGANIZED HEALTH CARE EDUCATION/TRAINING PROGRAM

## 2023-05-31 PROCEDURE — 36415 COLL VENOUS BLD VENIPUNCTURE: CPT | Mod: PN | Performed by: STUDENT IN AN ORGANIZED HEALTH CARE EDUCATION/TRAINING PROGRAM

## 2023-06-14 ENCOUNTER — LAB VISIT (OUTPATIENT)
Dept: LAB | Facility: HOSPITAL | Age: 48
End: 2023-06-14

## 2023-06-14 DIAGNOSIS — C92.10 CML (CHRONIC MYELOCYTIC LEUKEMIA): ICD-10-CM

## 2023-06-14 LAB
ALBUMIN SERPL BCP-MCNC: 3.7 G/DL (ref 3.5–5.2)
ALP SERPL-CCNC: 82 U/L (ref 55–135)
ALT SERPL W/O P-5'-P-CCNC: 17 U/L (ref 10–44)
ANION GAP SERPL CALC-SCNC: 6 MMOL/L (ref 8–16)
AST SERPL-CCNC: 17 U/L (ref 10–40)
BASOPHILS # BLD AUTO: 0.01 K/UL (ref 0–0.2)
BASOPHILS NFR BLD: 0.4 % (ref 0–1.9)
BILIRUB SERPL-MCNC: 0.3 MG/DL (ref 0.1–1)
BUN SERPL-MCNC: 13 MG/DL (ref 6–20)
CALCIUM SERPL-MCNC: 8.9 MG/DL (ref 8.7–10.5)
CHLORIDE SERPL-SCNC: 106 MMOL/L (ref 95–110)
CO2 SERPL-SCNC: 26 MMOL/L (ref 23–29)
CREAT SERPL-MCNC: 0.7 MG/DL (ref 0.5–1.4)
DIFFERENTIAL METHOD: ABNORMAL
EOSINOPHIL # BLD AUTO: 0 K/UL (ref 0–0.5)
EOSINOPHIL NFR BLD: 1.4 % (ref 0–8)
ERYTHROCYTE [DISTWIDTH] IN BLOOD BY AUTOMATED COUNT: 15.9 % (ref 11.5–14.5)
EST. GFR  (NO RACE VARIABLE): >60 ML/MIN/1.73 M^2
GLUCOSE SERPL-MCNC: 85 MG/DL (ref 70–110)
HCT VFR BLD AUTO: 31.5 % (ref 37–48.5)
HGB BLD-MCNC: 9.8 G/DL (ref 12–16)
IMM GRANULOCYTES # BLD AUTO: 0.01 K/UL (ref 0–0.04)
IMM GRANULOCYTES NFR BLD AUTO: 0.4 % (ref 0–0.5)
LYMPHOCYTES # BLD AUTO: 1 K/UL (ref 1–4.8)
LYMPHOCYTES NFR BLD: 34.8 % (ref 18–48)
MCH RBC QN AUTO: 30.1 PG (ref 27–31)
MCHC RBC AUTO-ENTMCNC: 31.1 G/DL (ref 32–36)
MCV RBC AUTO: 97 FL (ref 82–98)
MONOCYTES # BLD AUTO: 0.1 K/UL (ref 0.3–1)
MONOCYTES NFR BLD: 3.6 % (ref 4–15)
NEUTROPHILS # BLD AUTO: 1.7 K/UL (ref 1.8–7.7)
NEUTROPHILS NFR BLD: 59.4 % (ref 38–73)
NRBC BLD-RTO: 0 /100 WBC
PLATELET # BLD AUTO: 158 K/UL (ref 150–450)
PMV BLD AUTO: 10.6 FL (ref 9.2–12.9)
POTASSIUM SERPL-SCNC: 4.5 MMOL/L (ref 3.5–5.1)
PROT SERPL-MCNC: 6.4 G/DL (ref 6–8.4)
RBC # BLD AUTO: 3.26 M/UL (ref 4–5.4)
SODIUM SERPL-SCNC: 138 MMOL/L (ref 136–145)
WBC # BLD AUTO: 2.79 K/UL (ref 3.9–12.7)

## 2023-06-14 PROCEDURE — 85025 COMPLETE CBC W/AUTO DIFF WBC: CPT | Performed by: STUDENT IN AN ORGANIZED HEALTH CARE EDUCATION/TRAINING PROGRAM

## 2023-06-14 PROCEDURE — 36415 COLL VENOUS BLD VENIPUNCTURE: CPT | Mod: PN | Performed by: STUDENT IN AN ORGANIZED HEALTH CARE EDUCATION/TRAINING PROGRAM

## 2023-06-14 PROCEDURE — 80053 COMPREHEN METABOLIC PANEL: CPT | Performed by: STUDENT IN AN ORGANIZED HEALTH CARE EDUCATION/TRAINING PROGRAM

## 2023-06-28 ENCOUNTER — LAB VISIT (OUTPATIENT)
Dept: LAB | Facility: HOSPITAL | Age: 48
End: 2023-06-28

## 2023-06-28 DIAGNOSIS — C92.10 CML (CHRONIC MYELOCYTIC LEUKEMIA): ICD-10-CM

## 2023-06-28 LAB
ALBUMIN SERPL BCP-MCNC: 4 G/DL (ref 3.5–5.2)
ALP SERPL-CCNC: 81 U/L (ref 55–135)
ALT SERPL W/O P-5'-P-CCNC: 21 U/L (ref 10–44)
ANION GAP SERPL CALC-SCNC: 7 MMOL/L (ref 8–16)
AST SERPL-CCNC: 18 U/L (ref 10–40)
BASOPHILS # BLD AUTO: 0.01 K/UL (ref 0–0.2)
BASOPHILS NFR BLD: 0.3 % (ref 0–1.9)
BILIRUB SERPL-MCNC: 0.5 MG/DL (ref 0.1–1)
BUN SERPL-MCNC: 10 MG/DL (ref 6–20)
CALCIUM SERPL-MCNC: 8.9 MG/DL (ref 8.7–10.5)
CHLORIDE SERPL-SCNC: 109 MMOL/L (ref 95–110)
CO2 SERPL-SCNC: 24 MMOL/L (ref 23–29)
CREAT SERPL-MCNC: 0.8 MG/DL (ref 0.5–1.4)
DIFFERENTIAL METHOD: ABNORMAL
EOSINOPHIL # BLD AUTO: 0 K/UL (ref 0–0.5)
EOSINOPHIL NFR BLD: 0.3 % (ref 0–8)
ERYTHROCYTE [DISTWIDTH] IN BLOOD BY AUTOMATED COUNT: 15.6 % (ref 11.5–14.5)
EST. GFR  (NO RACE VARIABLE): >60 ML/MIN/1.73 M^2
GLUCOSE SERPL-MCNC: 92 MG/DL (ref 70–110)
HCT VFR BLD AUTO: 31.6 % (ref 37–48.5)
HGB BLD-MCNC: 10 G/DL (ref 12–16)
IMM GRANULOCYTES # BLD AUTO: 0 K/UL (ref 0–0.04)
IMM GRANULOCYTES NFR BLD AUTO: 0 % (ref 0–0.5)
LYMPHOCYTES # BLD AUTO: 1 K/UL (ref 1–4.8)
LYMPHOCYTES NFR BLD: 30.7 % (ref 18–48)
MCH RBC QN AUTO: 30.7 PG (ref 27–31)
MCHC RBC AUTO-ENTMCNC: 31.6 G/DL (ref 32–36)
MCV RBC AUTO: 97 FL (ref 82–98)
MONOCYTES # BLD AUTO: 0.2 K/UL (ref 0.3–1)
MONOCYTES NFR BLD: 4.7 % (ref 4–15)
NEUTROPHILS # BLD AUTO: 2.1 K/UL (ref 1.8–7.7)
NEUTROPHILS NFR BLD: 64 % (ref 38–73)
NRBC BLD-RTO: 0 /100 WBC
PLATELET # BLD AUTO: 171 K/UL (ref 150–450)
PMV BLD AUTO: 10.9 FL (ref 9.2–12.9)
POTASSIUM SERPL-SCNC: 4.1 MMOL/L (ref 3.5–5.1)
PROT SERPL-MCNC: 6.7 G/DL (ref 6–8.4)
RBC # BLD AUTO: 3.26 M/UL (ref 4–5.4)
SODIUM SERPL-SCNC: 140 MMOL/L (ref 136–145)
URATE SERPL-MCNC: 3.6 MG/DL (ref 2.4–5.7)
WBC # BLD AUTO: 3.22 K/UL (ref 3.9–12.7)

## 2023-06-28 PROCEDURE — 85025 COMPLETE CBC W/AUTO DIFF WBC: CPT | Performed by: STUDENT IN AN ORGANIZED HEALTH CARE EDUCATION/TRAINING PROGRAM

## 2023-06-28 PROCEDURE — 81206 BCR/ABL1 GENE MAJOR BP: CPT | Performed by: STUDENT IN AN ORGANIZED HEALTH CARE EDUCATION/TRAINING PROGRAM

## 2023-06-28 PROCEDURE — 80053 COMPREHEN METABOLIC PANEL: CPT | Performed by: STUDENT IN AN ORGANIZED HEALTH CARE EDUCATION/TRAINING PROGRAM

## 2023-06-28 PROCEDURE — 84550 ASSAY OF BLOOD/URIC ACID: CPT | Performed by: STUDENT IN AN ORGANIZED HEALTH CARE EDUCATION/TRAINING PROGRAM

## 2023-06-28 PROCEDURE — 36415 COLL VENOUS BLD VENIPUNCTURE: CPT | Mod: PN | Performed by: STUDENT IN AN ORGANIZED HEALTH CARE EDUCATION/TRAINING PROGRAM

## 2023-06-29 ENCOUNTER — OFFICE VISIT (OUTPATIENT)
Dept: HEMATOLOGY/ONCOLOGY | Facility: CLINIC | Age: 48
End: 2023-06-29

## 2023-06-29 VITALS
BODY MASS INDEX: 33.19 KG/M2 | RESPIRATION RATE: 16 BRPM | OXYGEN SATURATION: 100 % | WEIGHT: 169.06 LBS | SYSTOLIC BLOOD PRESSURE: 120 MMHG | DIASTOLIC BLOOD PRESSURE: 71 MMHG | HEART RATE: 78 BPM | HEIGHT: 60 IN | TEMPERATURE: 98 F

## 2023-06-29 DIAGNOSIS — D50.9 IRON DEFICIENCY ANEMIA, UNSPECIFIED IRON DEFICIENCY ANEMIA TYPE: ICD-10-CM

## 2023-06-29 DIAGNOSIS — C92.10 CML (CHRONIC MYELOCYTIC LEUKEMIA): Primary | ICD-10-CM

## 2023-06-29 PROCEDURE — 99215 OFFICE O/P EST HI 40 MIN: CPT | Mod: S$PBB,,, | Performed by: STUDENT IN AN ORGANIZED HEALTH CARE EDUCATION/TRAINING PROGRAM

## 2023-06-29 PROCEDURE — 99215 PR OFFICE/OUTPT VISIT, EST, LEVL V, 40-54 MIN: ICD-10-PCS | Mod: S$PBB,,, | Performed by: STUDENT IN AN ORGANIZED HEALTH CARE EDUCATION/TRAINING PROGRAM

## 2023-06-29 PROCEDURE — 99999 PR PBB SHADOW E&M-EST. PATIENT-LVL III: ICD-10-PCS | Mod: PBBFAC,,, | Performed by: STUDENT IN AN ORGANIZED HEALTH CARE EDUCATION/TRAINING PROGRAM

## 2023-06-29 PROCEDURE — 99999 PR PBB SHADOW E&M-EST. PATIENT-LVL III: CPT | Mod: PBBFAC,,, | Performed by: STUDENT IN AN ORGANIZED HEALTH CARE EDUCATION/TRAINING PROGRAM

## 2023-06-29 PROCEDURE — 99213 OFFICE O/P EST LOW 20 MIN: CPT | Mod: PBBFAC | Performed by: STUDENT IN AN ORGANIZED HEALTH CARE EDUCATION/TRAINING PROGRAM

## 2023-06-29 NOTE — PROGRESS NOTES
BMT Chart Routing      Follow up with physician 3 months.   Follow up with MICHELL    Provider visit type    Infusion scheduling note    Injection scheduling note    Labs   Scheduling:  Preferred lab:  Lab interval:  Cbc in 6 weeks. CBC and bcr-able p210 in 3 months. For 3 month appointment, please make lab appointment for 1 week prior to appointment   Imaging    Pharmacy appointment    Other referrals           HEMATOLOGY ONCOLOGY FELLOW CLINIC    IDENTIFYING STATEMENT   Audie Sandoval (Audie) is a 47 y.o. female with history of hypertension who presents to clinic for further evaluation and management of her leukocytosis    HISTORY OF PRESENT ILLNESS:    Patient initially alerted to her elevated WBC count when she got lab work through [employer]. After having blood drawn she received her results about one week later which showed WBC count of 120k. She discussed this with a friend of her's who is a nurse who encouraged her to be evaluated in emergency department. She presented to [facility] main [facility] where hematology team was alerted by ED physician out of concern for acute leukemia. Hematology team evaluated patient's ED lab work. WBC count of 129 and mild anemia with Hg of 11 but no thrombocytopenia. CMP grossly wnl. Elevated LDH to 817, and elevated uric acid to 6.9. Peripheral smear reviewed with findings of arked leukocytosis with a granulocytic predominance.   Granulocytes are left shifted to the myelocyte stage.  Occasional blasts and promyelocytes are present, estimated less than 5%. Basophils are increased. Patient evaluated by hematology at bedside. Physical exam unremarkable and patient denies major systemic symptoms. Likely diagnosis of CML (chronic phase) was made with discharge directly from ED to clinic on 3/2 for further work up and management.     Bone marrow pathology significant for 100% cellularity with myeloid predominance (M/E= 11:1) consistent with chronic myeloid leukemia, bcr-abl 1  positive, chronic phase; chromosomal analysis pending. BCR/ABL1 p210 mRNA 27%.  --------------------------------------------------------------------------------------------------------------------------------------------------------------------------------------------------  Patient presents to clinic for follow up appointment. She reports significant side effects of ongoing fatigue, nausea, and transient facial swelling when she takes the medication at night; however she feels that she can tolerate it if she needs to. She requests to be on a lower dose of imatinib because she feels it will be easier to come off the medication in 3 years if it is a lower dose. We had long discussion regarding the transient nature of the side effects as well as its mechanism of action; in that it does not cause the patient to become dependent in the way that a benzodiazapine would. We also discussed that patient should consider getting insurance. While imatinib is affordable on self pay, should she ever need a different generation TKI, this would not be manageable on self pay. We also discussed her ongoing fatigue and being iron deficient. As she is self pay, we discussed PO iron supplementation.      Past Medical History:   Diagnosis Date    Allergy     Gestational hypertension        Family History   Problem Relation Age of Onset    Hypertension Mother     Arthritis Mother         DJD spine    Abnormal EKG Father     Diabetes Father        Social History     Socioeconomic History    Marital status:    Tobacco Use    Smoking status: Never    Smokeless tobacco: Never   Substance and Sexual Activity    Alcohol use: Yes     Comment: Rare    Drug use: Never    Sexual activity: Yes     Partners: Male         MEDICATIONS:     Current Outpatient Medications on File Prior to Visit   Medication Sig Dispense Refill    acyclovir (ZOVIRAX) 400 MG tablet Take 400 mg by mouth 2 (two) times daily.      ALBUTEROL, REFILL, INHL        azithromycin (Z-NOHEMY) 250 MG tablet Take 1 tablet (250 mg total) by mouth once daily. Take first 2 tablets together, then 1 every day until finished. (Patient not taking: Reported on 4/20/2023) 6 tablet 0    imatinib (GLEEVEC) 400 MG Tab Take 1 tablet (400 mg total) by mouth once daily. 30 tablet 11     No current facility-administered medications on file prior to visit.       ALLERGIES:   Review of patient's allergies indicates:   Allergen Reactions    Codeine     Wheat containing prod         ROS:       Review of Systems   Constitutional:  Negative for appetite change, chills, fatigue, fever and unexpected weight change.   HENT:   Negative for sore throat and trouble swallowing.    Respiratory:  Negative for cough, shortness of breath and wheezing.    Cardiovascular:  Negative for leg swelling and palpitations.   Gastrointestinal:  Negative for abdominal pain, diarrhea and nausea.   Genitourinary:  Negative for dysuria and frequency.    Musculoskeletal:  Negative for arthralgias and myalgias.   Skin:  Negative for rash.   Neurological:  Negative for dizziness, headaches and light-headedness.   Hematological:  Negative for adenopathy. Does not bruise/bleed easily.   Psychiatric/Behavioral:  Negative for confusion and decreased concentration. The patient is nervous/anxious.      PHYSICAL EXAM:  Vitals:    06/29/23 0946   BP: 120/71   Pulse: 78   Resp: 16   Temp: 98.3 °F (36.8 °C)   SpO2: 100%   Weight: 76.7 kg (169 lb 1.5 oz)   Height: 5' (1.524 m)   PainSc: 0-No pain           Physical Exam  Vitals reviewed.   Constitutional:       General: She is not in acute distress.     Appearance: She is well-developed. She is not toxic-appearing.   HENT:      Head: Normocephalic and atraumatic.      Right Ear: External ear normal.      Left Ear: External ear normal.      Nose: Nose normal.      Mouth/Throat:      Mouth: Mucous membranes are moist.      Pharynx: Oropharynx is clear. No oropharyngeal exudate.   Eyes:       General: No scleral icterus.     Extraocular Movements: Extraocular movements intact.      Conjunctiva/sclera: Conjunctivae normal.      Pupils: Pupils are equal, round, and reactive to light.   Cardiovascular:      Rate and Rhythm: Normal rate and regular rhythm.      Heart sounds: Normal heart sounds. No murmur heard.  Pulmonary:      Effort: Pulmonary effort is normal.      Breath sounds: Normal breath sounds. No wheezing or rales.   Abdominal:      General: Bowel sounds are normal. There is no distension.      Palpations: Abdomen is soft.      Tenderness: There is no abdominal tenderness.   Musculoskeletal:         General: No deformity. Normal range of motion.      Cervical back: Normal range of motion and neck supple.      Right lower leg: No edema.      Left lower leg: No edema.   Lymphadenopathy:      Cervical: No cervical adenopathy.   Skin:     General: Skin is warm and dry.   Neurological:      Mental Status: She is alert and oriented to person, place, and time.   Psychiatric:         Behavior: Behavior normal.       LAB:   Results for orders placed or performed in visit on 06/28/23   CBC W/ AUTO DIFFERENTIAL   Result Value Ref Range    WBC 3.22 (L) 3.90 - 12.70 K/uL    RBC 3.26 (L) 4.00 - 5.40 M/uL    Hemoglobin 10.0 (L) 12.0 - 16.0 g/dL    Hematocrit 31.6 (L) 37.0 - 48.5 %    MCV 97 82 - 98 fL    MCH 30.7 27.0 - 31.0 pg    MCHC 31.6 (L) 32.0 - 36.0 g/dL    RDW 15.6 (H) 11.5 - 14.5 %    Platelets 171 150 - 450 K/uL    MPV 10.9 9.2 - 12.9 fL    Immature Granulocytes 0.0 0.0 - 0.5 %    Gran # (ANC) 2.1 1.8 - 7.7 K/uL    Immature Grans (Abs) 0.00 0.00 - 0.04 K/uL    Lymph # 1.0 1.0 - 4.8 K/uL    Mono # 0.2 (L) 0.3 - 1.0 K/uL    Eos # 0.0 0.0 - 0.5 K/uL    Baso # 0.01 0.00 - 0.20 K/uL    nRBC 0 0 /100 WBC    Gran % 64.0 38.0 - 73.0 %    Lymph % 30.7 18.0 - 48.0 %    Mono % 4.7 4.0 - 15.0 %    Eosinophil % 0.3 0.0 - 8.0 %    Basophil % 0.3 0.0 - 1.9 %    Differential Method Automated    COMPREHENSIVE  METABOLIC PANEL   Result Value Ref Range    Sodium 140 136 - 145 mmol/L    Potassium 4.1 3.5 - 5.1 mmol/L    Chloride 109 95 - 110 mmol/L    CO2 24 23 - 29 mmol/L    Glucose 92 70 - 110 mg/dL    BUN 10 6 - 20 mg/dL    Creatinine 0.8 0.5 - 1.4 mg/dL    Calcium 8.9 8.7 - 10.5 mg/dL    Total Protein 6.7 6.0 - 8.4 g/dL    Albumin 4.0 3.5 - 5.2 g/dL    Total Bilirubin 0.5 0.1 - 1.0 mg/dL    Alkaline Phosphatase 81 55 - 135 U/L    AST 18 10 - 40 U/L    ALT 21 10 - 44 U/L    eGFR >60.0 >60 mL/min/1.73 m^2    Anion Gap 7 (L) 8 - 16 mmol/L   URIC ACID   Result Value Ref Range    Uric Acid 3.6 2.4 - 5.7 mg/dL   BCR/ABL, p210, Quant, Monitor, blood   Result Value Ref Range    Specimen Type, p210, Quant, bld Blood        PROBLEMS ASSESSED THIS VISIT:    1. CML (chronic myelocytic leukemia)    2. Iron deficiency anemia, unspecified iron deficiency anemia type        ASSESSMENT AND PLAN    1  47 year old female with minimal past medical history who presented to hospital with elevated WBC count. WBC count of 129 and mild anemia with Hg of 11 but no thrombocytopenia. CMP grossly wnl. Elevated LDH to 817, and elevated uric acid to 6.9. Peripheral smear reviewed with findings of arked leukocytosis with a granulocytic predominance. Granulocytes are left shifted to the myelocyte stage.  Occasional blasts and promyelocytes are present, estimated less than 5%. Basophils are increased. Her presentation, initial lab work and clinical presentation indicative of CML chronic phase.     Bone marrow biopsy done on 3/08. Bone marrow pathology significant for 100% cellularity with myeloid predominance (M/E= 11:1) consistent with chronic myeloid leukemia, bcr-abl 1 positive, chronic phase; chromosomal analysis pending. BCR/ABL1 p210 mRNA 27%. Low risk per Sokal score    - Currently taking imatinib 400 mg daily. She reports significant side effects that are manageable by supportive medications. We discussed transient nature of side effects and  recommended remaining on 400 mg dose. Will consider reducing dose to 300 mg depending on most recent p210. Continue to monitor for possible side effects (fluid retention, nausea, etc)  - No significant cytopenia noted during initiation of imatinib. Will space out labs to every 6 weeks. Recheck bcr-abl in 3 months  - Follow up with me in person in 3 month  - BCR-ABL peripheral blood 30% on 3/14, most recent bcr-abl pending    2  - Continues to have macrocytic anemia. Patient is self pay so labs not previously drawn  - Iron studies complete. Patient has iron deficiency (no storage iron on marrow)  - Will inquire about self pay price for IV iron replace. Advised PO ferrous sulfate in the meantime     Discussed with Dr. Brianda Oviedo MD  PGY-IV  Hematology/Oncology Fellow

## 2023-07-03 ENCOUNTER — PATIENT MESSAGE (OUTPATIENT)
Dept: HEMATOLOGY/ONCOLOGY | Facility: CLINIC | Age: 48
End: 2023-07-03

## 2023-07-03 LAB
BCR/ABL,P210 RESULT: NORMAL
PATH REPORT.FINAL DX SPEC: NORMAL
SPECIMEN TYPE: NORMAL

## 2023-07-12 ENCOUNTER — LAB VISIT (OUTPATIENT)
Dept: LAB | Facility: HOSPITAL | Age: 48
End: 2023-07-12

## 2023-07-12 DIAGNOSIS — C92.10 CML (CHRONIC MYELOCYTIC LEUKEMIA): ICD-10-CM

## 2023-07-12 LAB
ALBUMIN SERPL BCP-MCNC: 3.6 G/DL (ref 3.5–5.2)
ALP SERPL-CCNC: 77 U/L (ref 55–135)
ALT SERPL W/O P-5'-P-CCNC: 19 U/L (ref 10–44)
ANION GAP SERPL CALC-SCNC: 6 MMOL/L (ref 8–16)
AST SERPL-CCNC: 20 U/L (ref 10–40)
BASOPHILS # BLD AUTO: 0.01 K/UL (ref 0–0.2)
BASOPHILS NFR BLD: 0.3 % (ref 0–1.9)
BILIRUB SERPL-MCNC: 0.3 MG/DL (ref 0.1–1)
BUN SERPL-MCNC: 7 MG/DL (ref 6–20)
CALCIUM SERPL-MCNC: 8.5 MG/DL (ref 8.7–10.5)
CHLORIDE SERPL-SCNC: 107 MMOL/L (ref 95–110)
CO2 SERPL-SCNC: 26 MMOL/L (ref 23–29)
CREAT SERPL-MCNC: 0.7 MG/DL (ref 0.5–1.4)
DIFFERENTIAL METHOD: ABNORMAL
EOSINOPHIL # BLD AUTO: 0.1 K/UL (ref 0–0.5)
EOSINOPHIL NFR BLD: 1.8 % (ref 0–8)
ERYTHROCYTE [DISTWIDTH] IN BLOOD BY AUTOMATED COUNT: 15.8 % (ref 11.5–14.5)
EST. GFR  (NO RACE VARIABLE): >60 ML/MIN/1.73 M^2
GLUCOSE SERPL-MCNC: 88 MG/DL (ref 70–110)
HCT VFR BLD AUTO: 32.4 % (ref 37–48.5)
HGB BLD-MCNC: 10.1 G/DL (ref 12–16)
IMM GRANULOCYTES # BLD AUTO: 0 K/UL (ref 0–0.04)
IMM GRANULOCYTES NFR BLD AUTO: 0 % (ref 0–0.5)
LYMPHOCYTES # BLD AUTO: 1.3 K/UL (ref 1–4.8)
LYMPHOCYTES NFR BLD: 32.7 % (ref 18–48)
MCH RBC QN AUTO: 30.4 PG (ref 27–31)
MCHC RBC AUTO-ENTMCNC: 31.2 G/DL (ref 32–36)
MCV RBC AUTO: 98 FL (ref 82–98)
MONOCYTES # BLD AUTO: 0.2 K/UL (ref 0.3–1)
MONOCYTES NFR BLD: 4.4 % (ref 4–15)
NEUTROPHILS # BLD AUTO: 2.3 K/UL (ref 1.8–7.7)
NEUTROPHILS NFR BLD: 60.8 % (ref 38–73)
NRBC BLD-RTO: 0 /100 WBC
PLATELET # BLD AUTO: 199 K/UL (ref 150–450)
PMV BLD AUTO: 10.3 FL (ref 9.2–12.9)
POTASSIUM SERPL-SCNC: 4.1 MMOL/L (ref 3.5–5.1)
PROT SERPL-MCNC: 6.1 G/DL (ref 6–8.4)
RBC # BLD AUTO: 3.32 M/UL (ref 4–5.4)
SODIUM SERPL-SCNC: 139 MMOL/L (ref 136–145)
WBC # BLD AUTO: 3.85 K/UL (ref 3.9–12.7)

## 2023-07-12 PROCEDURE — 85025 COMPLETE CBC W/AUTO DIFF WBC: CPT | Performed by: STUDENT IN AN ORGANIZED HEALTH CARE EDUCATION/TRAINING PROGRAM

## 2023-07-12 PROCEDURE — 36415 COLL VENOUS BLD VENIPUNCTURE: CPT | Mod: PN | Performed by: STUDENT IN AN ORGANIZED HEALTH CARE EDUCATION/TRAINING PROGRAM

## 2023-07-12 PROCEDURE — 80053 COMPREHEN METABOLIC PANEL: CPT | Performed by: STUDENT IN AN ORGANIZED HEALTH CARE EDUCATION/TRAINING PROGRAM

## 2023-07-12 RX ORDER — IMATINIB MESYLATE 100 MG/1
300 TABLET, FILM COATED ORAL DAILY
Qty: 60 TABLET | Refills: 3 | Status: SHIPPED | OUTPATIENT
Start: 2023-07-12 | End: 2023-07-14 | Stop reason: SDUPTHER

## 2023-07-14 DIAGNOSIS — C92.10 CML (CHRONIC MYELOCYTIC LEUKEMIA): ICD-10-CM

## 2023-07-14 RX ORDER — IMATINIB MESYLATE 100 MG/1
300 TABLET, FILM COATED ORAL DAILY
Qty: 90 TABLET | Refills: 3 | Status: SHIPPED | OUTPATIENT
Start: 2023-07-14 | End: 2023-10-12

## 2023-07-26 ENCOUNTER — LAB VISIT (OUTPATIENT)
Dept: LAB | Facility: HOSPITAL | Age: 48
End: 2023-07-26

## 2023-07-26 DIAGNOSIS — I10 ESSENTIAL HYPERTENSION: ICD-10-CM

## 2023-07-26 DIAGNOSIS — D50.9 IRON DEFICIENCY ANEMIA, UNSPECIFIED IRON DEFICIENCY ANEMIA TYPE: ICD-10-CM

## 2023-07-26 DIAGNOSIS — C92.10 CML (CHRONIC MYELOCYTIC LEUKEMIA): ICD-10-CM

## 2023-07-26 LAB
ALBUMIN SERPL BCP-MCNC: 3.7 G/DL (ref 3.5–5.2)
ALP SERPL-CCNC: 70 U/L (ref 55–135)
ALT SERPL W/O P-5'-P-CCNC: 17 U/L (ref 10–44)
ANION GAP SERPL CALC-SCNC: 8 MMOL/L (ref 8–16)
AST SERPL-CCNC: 17 U/L (ref 10–40)
BASOPHILS # BLD AUTO: 0.01 K/UL (ref 0–0.2)
BASOPHILS NFR BLD: 0.2 % (ref 0–1.9)
BILIRUB SERPL-MCNC: 0.4 MG/DL (ref 0.1–1)
BUN SERPL-MCNC: 13 MG/DL (ref 6–20)
CALCIUM SERPL-MCNC: 8.8 MG/DL (ref 8.7–10.5)
CHLORIDE SERPL-SCNC: 108 MMOL/L (ref 95–110)
CO2 SERPL-SCNC: 23 MMOL/L (ref 23–29)
CREAT SERPL-MCNC: 0.7 MG/DL (ref 0.5–1.4)
DIFFERENTIAL METHOD: ABNORMAL
EOSINOPHIL # BLD AUTO: 0.1 K/UL (ref 0–0.5)
EOSINOPHIL NFR BLD: 2 % (ref 0–8)
ERYTHROCYTE [DISTWIDTH] IN BLOOD BY AUTOMATED COUNT: 16.3 % (ref 11.5–14.5)
EST. GFR  (NO RACE VARIABLE): >60 ML/MIN/1.73 M^2
GLUCOSE SERPL-MCNC: 92 MG/DL (ref 70–110)
HCT VFR BLD AUTO: 34.5 % (ref 37–48.5)
HGB BLD-MCNC: 11 G/DL (ref 12–16)
IMM GRANULOCYTES # BLD AUTO: 0.01 K/UL (ref 0–0.04)
IMM GRANULOCYTES NFR BLD AUTO: 0.2 % (ref 0–0.5)
LYMPHOCYTES # BLD AUTO: 1.3 K/UL (ref 1–4.8)
LYMPHOCYTES NFR BLD: 29.3 % (ref 18–48)
MCH RBC QN AUTO: 30.5 PG (ref 27–31)
MCHC RBC AUTO-ENTMCNC: 31.9 G/DL (ref 32–36)
MCV RBC AUTO: 96 FL (ref 82–98)
MONOCYTES # BLD AUTO: 0.2 K/UL (ref 0.3–1)
MONOCYTES NFR BLD: 4.8 % (ref 4–15)
NEUTROPHILS # BLD AUTO: 2.8 K/UL (ref 1.8–7.7)
NEUTROPHILS NFR BLD: 63.5 % (ref 38–73)
NRBC BLD-RTO: 0 /100 WBC
PLATELET # BLD AUTO: 214 K/UL (ref 150–450)
PMV BLD AUTO: 9.9 FL (ref 9.2–12.9)
POTASSIUM SERPL-SCNC: 4.4 MMOL/L (ref 3.5–5.1)
PROT SERPL-MCNC: 6.4 G/DL (ref 6–8.4)
RBC # BLD AUTO: 3.61 M/UL (ref 4–5.4)
SODIUM SERPL-SCNC: 139 MMOL/L (ref 136–145)
WBC # BLD AUTO: 4.4 K/UL (ref 3.9–12.7)

## 2023-07-26 PROCEDURE — 36415 COLL VENOUS BLD VENIPUNCTURE: CPT | Mod: PN | Performed by: FAMILY MEDICINE

## 2023-07-26 PROCEDURE — 85025 COMPLETE CBC W/AUTO DIFF WBC: CPT | Performed by: STUDENT IN AN ORGANIZED HEALTH CARE EDUCATION/TRAINING PROGRAM

## 2023-07-26 PROCEDURE — 80053 COMPREHEN METABOLIC PANEL: CPT | Performed by: FAMILY MEDICINE

## 2023-08-09 ENCOUNTER — LAB VISIT (OUTPATIENT)
Dept: LAB | Facility: HOSPITAL | Age: 48
End: 2023-08-09

## 2023-08-09 DIAGNOSIS — C92.10 CML (CHRONIC MYELOCYTIC LEUKEMIA): ICD-10-CM

## 2023-08-09 DIAGNOSIS — D50.9 IRON DEFICIENCY ANEMIA, UNSPECIFIED IRON DEFICIENCY ANEMIA TYPE: ICD-10-CM

## 2023-08-09 LAB
BASOPHILS # BLD AUTO: 0.01 K/UL (ref 0–0.2)
BASOPHILS NFR BLD: 0.2 % (ref 0–1.9)
DIFFERENTIAL METHOD: ABNORMAL
EOSINOPHIL # BLD AUTO: 0.1 K/UL (ref 0–0.5)
EOSINOPHIL NFR BLD: 2.1 % (ref 0–8)
ERYTHROCYTE [DISTWIDTH] IN BLOOD BY AUTOMATED COUNT: 15.4 % (ref 11.5–14.5)
HCT VFR BLD AUTO: 34.5 % (ref 37–48.5)
HGB BLD-MCNC: 11.4 G/DL (ref 12–16)
IMM GRANULOCYTES # BLD AUTO: 0 K/UL (ref 0–0.04)
IMM GRANULOCYTES NFR BLD AUTO: 0 % (ref 0–0.5)
LYMPHOCYTES # BLD AUTO: 1.4 K/UL (ref 1–4.8)
LYMPHOCYTES NFR BLD: 30.8 % (ref 18–48)
MCH RBC QN AUTO: 31.3 PG (ref 27–31)
MCHC RBC AUTO-ENTMCNC: 33 G/DL (ref 32–36)
MCV RBC AUTO: 95 FL (ref 82–98)
MONOCYTES # BLD AUTO: 0.2 K/UL (ref 0.3–1)
MONOCYTES NFR BLD: 3.9 % (ref 4–15)
NEUTROPHILS # BLD AUTO: 2.8 K/UL (ref 1.8–7.7)
NEUTROPHILS NFR BLD: 63 % (ref 38–73)
NRBC BLD-RTO: 0 /100 WBC
PLATELET # BLD AUTO: 178 K/UL (ref 150–450)
PMV BLD AUTO: 10.2 FL (ref 9.2–12.9)
RBC # BLD AUTO: 3.64 M/UL (ref 4–5.4)
WBC # BLD AUTO: 4.39 K/UL (ref 3.9–12.7)

## 2023-08-09 PROCEDURE — 85025 COMPLETE CBC W/AUTO DIFF WBC: CPT | Performed by: STUDENT IN AN ORGANIZED HEALTH CARE EDUCATION/TRAINING PROGRAM

## 2023-08-09 PROCEDURE — 36415 COLL VENOUS BLD VENIPUNCTURE: CPT | Mod: PN | Performed by: STUDENT IN AN ORGANIZED HEALTH CARE EDUCATION/TRAINING PROGRAM

## 2023-08-23 ENCOUNTER — LAB VISIT (OUTPATIENT)
Dept: LAB | Facility: HOSPITAL | Age: 48
End: 2023-08-23
Attending: FAMILY MEDICINE

## 2023-08-23 ENCOUNTER — PATIENT MESSAGE (OUTPATIENT)
Dept: HEMATOLOGY/ONCOLOGY | Facility: CLINIC | Age: 48
End: 2023-08-23

## 2023-08-23 DIAGNOSIS — D50.9 IRON DEFICIENCY ANEMIA, UNSPECIFIED IRON DEFICIENCY ANEMIA TYPE: ICD-10-CM

## 2023-08-23 DIAGNOSIS — C92.10 CML (CHRONIC MYELOCYTIC LEUKEMIA): ICD-10-CM

## 2023-08-23 LAB
BASOPHILS # BLD AUTO: 0.02 K/UL (ref 0–0.2)
BASOPHILS NFR BLD: 0.2 % (ref 0–1.9)
DIFFERENTIAL METHOD: ABNORMAL
EOSINOPHIL # BLD AUTO: 0.1 K/UL (ref 0–0.5)
EOSINOPHIL NFR BLD: 0.7 % (ref 0–8)
ERYTHROCYTE [DISTWIDTH] IN BLOOD BY AUTOMATED COUNT: 15.4 % (ref 11.5–14.5)
HCT VFR BLD AUTO: 37.6 % (ref 37–48.5)
HGB BLD-MCNC: 12 G/DL (ref 12–16)
IMM GRANULOCYTES # BLD AUTO: 0.01 K/UL (ref 0–0.04)
IMM GRANULOCYTES NFR BLD AUTO: 0.1 % (ref 0–0.5)
LYMPHOCYTES # BLD AUTO: 1.1 K/UL (ref 1–4.8)
LYMPHOCYTES NFR BLD: 13.6 % (ref 18–48)
MCH RBC QN AUTO: 30.7 PG (ref 27–31)
MCHC RBC AUTO-ENTMCNC: 31.9 G/DL (ref 32–36)
MCV RBC AUTO: 96 FL (ref 82–98)
MONOCYTES # BLD AUTO: 0.4 K/UL (ref 0.3–1)
MONOCYTES NFR BLD: 4.6 % (ref 4–15)
NEUTROPHILS # BLD AUTO: 6.5 K/UL (ref 1.8–7.7)
NEUTROPHILS NFR BLD: 80.8 % (ref 38–73)
NRBC BLD-RTO: 0 /100 WBC
PLATELET # BLD AUTO: 186 K/UL (ref 150–450)
PMV BLD AUTO: 10.1 FL (ref 9.2–12.9)
RBC # BLD AUTO: 3.91 M/UL (ref 4–5.4)
WBC # BLD AUTO: 8.04 K/UL (ref 3.9–12.7)

## 2023-08-23 PROCEDURE — 85025 COMPLETE CBC W/AUTO DIFF WBC: CPT | Performed by: STUDENT IN AN ORGANIZED HEALTH CARE EDUCATION/TRAINING PROGRAM

## 2023-08-23 PROCEDURE — 36415 COLL VENOUS BLD VENIPUNCTURE: CPT | Mod: PN | Performed by: STUDENT IN AN ORGANIZED HEALTH CARE EDUCATION/TRAINING PROGRAM

## 2023-09-06 ENCOUNTER — LAB VISIT (OUTPATIENT)
Dept: LAB | Facility: HOSPITAL | Age: 48
End: 2023-09-06
Attending: FAMILY MEDICINE

## 2023-09-06 DIAGNOSIS — C92.10 CML (CHRONIC MYELOCYTIC LEUKEMIA): ICD-10-CM

## 2023-09-06 DIAGNOSIS — D50.9 IRON DEFICIENCY ANEMIA, UNSPECIFIED IRON DEFICIENCY ANEMIA TYPE: ICD-10-CM

## 2023-09-06 LAB
BASOPHILS # BLD AUTO: 0.01 K/UL (ref 0–0.2)
BASOPHILS NFR BLD: 0.2 % (ref 0–1.9)
DIFFERENTIAL METHOD: ABNORMAL
EOSINOPHIL # BLD AUTO: 0.1 K/UL (ref 0–0.5)
EOSINOPHIL NFR BLD: 2.1 % (ref 0–8)
ERYTHROCYTE [DISTWIDTH] IN BLOOD BY AUTOMATED COUNT: 14.2 % (ref 11.5–14.5)
HCT VFR BLD AUTO: 36.5 % (ref 37–48.5)
HGB BLD-MCNC: 11.6 G/DL (ref 12–16)
IMM GRANULOCYTES # BLD AUTO: 0.01 K/UL (ref 0–0.04)
IMM GRANULOCYTES NFR BLD AUTO: 0.2 % (ref 0–0.5)
LYMPHOCYTES # BLD AUTO: 1.5 K/UL (ref 1–4.8)
LYMPHOCYTES NFR BLD: 28 % (ref 18–48)
MCH RBC QN AUTO: 31.3 PG (ref 27–31)
MCHC RBC AUTO-ENTMCNC: 31.8 G/DL (ref 32–36)
MCV RBC AUTO: 98 FL (ref 82–98)
MONOCYTES # BLD AUTO: 0.3 K/UL (ref 0.3–1)
MONOCYTES NFR BLD: 4.7 % (ref 4–15)
NEUTROPHILS # BLD AUTO: 3.5 K/UL (ref 1.8–7.7)
NEUTROPHILS NFR BLD: 64.8 % (ref 38–73)
NRBC BLD-RTO: 0 /100 WBC
PLATELET # BLD AUTO: 206 K/UL (ref 150–450)
PMV BLD AUTO: 10.1 FL (ref 9.2–12.9)
RBC # BLD AUTO: 3.71 M/UL (ref 4–5.4)
WBC # BLD AUTO: 5.36 K/UL (ref 3.9–12.7)

## 2023-09-06 PROCEDURE — 81206 BCR/ABL1 GENE MAJOR BP: CPT | Performed by: STUDENT IN AN ORGANIZED HEALTH CARE EDUCATION/TRAINING PROGRAM

## 2023-09-06 PROCEDURE — 85025 COMPLETE CBC W/AUTO DIFF WBC: CPT | Performed by: STUDENT IN AN ORGANIZED HEALTH CARE EDUCATION/TRAINING PROGRAM

## 2023-09-11 ENCOUNTER — PATIENT MESSAGE (OUTPATIENT)
Dept: HEMATOLOGY/ONCOLOGY | Facility: CLINIC | Age: 48
End: 2023-09-11

## 2023-09-11 LAB
BCR/ABL,P210 RESULT: NORMAL
PATH REPORT.FINAL DX SPEC: NORMAL
SPECIMEN TYPE: NORMAL

## 2023-09-20 ENCOUNTER — LAB VISIT (OUTPATIENT)
Dept: LAB | Facility: HOSPITAL | Age: 48
End: 2023-09-20

## 2023-09-20 DIAGNOSIS — D50.9 IRON DEFICIENCY ANEMIA, UNSPECIFIED IRON DEFICIENCY ANEMIA TYPE: ICD-10-CM

## 2023-09-20 DIAGNOSIS — C92.10 CML (CHRONIC MYELOCYTIC LEUKEMIA): ICD-10-CM

## 2023-09-20 LAB
BASOPHILS # BLD AUTO: 0.01 K/UL (ref 0–0.2)
BASOPHILS NFR BLD: 0.2 % (ref 0–1.9)
DIFFERENTIAL METHOD: ABNORMAL
EOSINOPHIL # BLD AUTO: 0.1 K/UL (ref 0–0.5)
EOSINOPHIL NFR BLD: 1.8 % (ref 0–8)
ERYTHROCYTE [DISTWIDTH] IN BLOOD BY AUTOMATED COUNT: 14 % (ref 11.5–14.5)
HCT VFR BLD AUTO: 37.5 % (ref 37–48.5)
HGB BLD-MCNC: 12.1 G/DL (ref 12–16)
IMM GRANULOCYTES # BLD AUTO: 0.01 K/UL (ref 0–0.04)
IMM GRANULOCYTES NFR BLD AUTO: 0.2 % (ref 0–0.5)
LYMPHOCYTES # BLD AUTO: 1.3 K/UL (ref 1–4.8)
LYMPHOCYTES NFR BLD: 29 % (ref 18–48)
MCH RBC QN AUTO: 31.9 PG (ref 27–31)
MCHC RBC AUTO-ENTMCNC: 32.3 G/DL (ref 32–36)
MCV RBC AUTO: 99 FL (ref 82–98)
MONOCYTES # BLD AUTO: 0.2 K/UL (ref 0.3–1)
MONOCYTES NFR BLD: 4.7 % (ref 4–15)
NEUTROPHILS # BLD AUTO: 2.9 K/UL (ref 1.8–7.7)
NEUTROPHILS NFR BLD: 64.1 % (ref 38–73)
NRBC BLD-RTO: 0 /100 WBC
PLATELET # BLD AUTO: 186 K/UL (ref 150–450)
PMV BLD AUTO: 10.4 FL (ref 9.2–12.9)
RBC # BLD AUTO: 3.79 M/UL (ref 4–5.4)
WBC # BLD AUTO: 4.51 K/UL (ref 3.9–12.7)

## 2023-09-20 PROCEDURE — 85025 COMPLETE CBC W/AUTO DIFF WBC: CPT | Performed by: STUDENT IN AN ORGANIZED HEALTH CARE EDUCATION/TRAINING PROGRAM

## 2023-09-20 PROCEDURE — 36415 COLL VENOUS BLD VENIPUNCTURE: CPT | Mod: PN | Performed by: STUDENT IN AN ORGANIZED HEALTH CARE EDUCATION/TRAINING PROGRAM

## 2023-10-11 ENCOUNTER — LAB VISIT (OUTPATIENT)
Dept: LAB | Facility: HOSPITAL | Age: 48
End: 2023-10-11
Attending: FAMILY MEDICINE

## 2023-10-11 DIAGNOSIS — D50.9 IRON DEFICIENCY ANEMIA, UNSPECIFIED IRON DEFICIENCY ANEMIA TYPE: ICD-10-CM

## 2023-10-11 DIAGNOSIS — C92.10 CML (CHRONIC MYELOCYTIC LEUKEMIA): ICD-10-CM

## 2023-10-11 DIAGNOSIS — C92.10 CML (CHRONIC MYELOCYTIC LEUKEMIA): Primary | ICD-10-CM

## 2023-10-11 LAB
ALBUMIN SERPL BCP-MCNC: 4 G/DL (ref 3.5–5.2)
ALP SERPL-CCNC: 63 U/L (ref 55–135)
ALT SERPL W/O P-5'-P-CCNC: 14 U/L (ref 10–44)
ANION GAP SERPL CALC-SCNC: 8 MMOL/L (ref 8–16)
AST SERPL-CCNC: 15 U/L (ref 10–40)
BASOPHILS # BLD AUTO: 0.01 K/UL (ref 0–0.2)
BASOPHILS NFR BLD: 0.2 % (ref 0–1.9)
BILIRUB SERPL-MCNC: 0.4 MG/DL (ref 0.1–1)
BUN SERPL-MCNC: 13 MG/DL (ref 6–20)
CALCIUM SERPL-MCNC: 9.7 MG/DL (ref 8.7–10.5)
CHLORIDE SERPL-SCNC: 107 MMOL/L (ref 95–110)
CO2 SERPL-SCNC: 22 MMOL/L (ref 23–29)
CREAT SERPL-MCNC: 0.7 MG/DL (ref 0.5–1.4)
DIFFERENTIAL METHOD: ABNORMAL
EOSINOPHIL # BLD AUTO: 0.1 K/UL (ref 0–0.5)
EOSINOPHIL NFR BLD: 1.5 % (ref 0–8)
ERYTHROCYTE [DISTWIDTH] IN BLOOD BY AUTOMATED COUNT: 13.3 % (ref 11.5–14.5)
EST. GFR  (NO RACE VARIABLE): >60 ML/MIN/1.73 M^2
GLUCOSE SERPL-MCNC: 91 MG/DL (ref 70–110)
HCT VFR BLD AUTO: 40.6 % (ref 37–48.5)
HGB BLD-MCNC: 13.1 G/DL (ref 12–16)
IMM GRANULOCYTES # BLD AUTO: 0.01 K/UL (ref 0–0.04)
IMM GRANULOCYTES NFR BLD AUTO: 0.2 % (ref 0–0.5)
LYMPHOCYTES # BLD AUTO: 1.5 K/UL (ref 1–4.8)
LYMPHOCYTES NFR BLD: 25.5 % (ref 18–48)
MCH RBC QN AUTO: 32.2 PG (ref 27–31)
MCHC RBC AUTO-ENTMCNC: 32.3 G/DL (ref 32–36)
MCV RBC AUTO: 100 FL (ref 82–98)
MONOCYTES # BLD AUTO: 0.2 K/UL (ref 0.3–1)
MONOCYTES NFR BLD: 3.7 % (ref 4–15)
NEUTROPHILS # BLD AUTO: 4.1 K/UL (ref 1.8–7.7)
NEUTROPHILS NFR BLD: 68.9 % (ref 38–73)
NRBC BLD-RTO: 0 /100 WBC
PLATELET # BLD AUTO: 196 K/UL (ref 150–450)
PMV BLD AUTO: 11.4 FL (ref 9.2–12.9)
POTASSIUM SERPL-SCNC: 4.5 MMOL/L (ref 3.5–5.1)
PROT SERPL-MCNC: 7 G/DL (ref 6–8.4)
RBC # BLD AUTO: 4.07 M/UL (ref 4–5.4)
SODIUM SERPL-SCNC: 137 MMOL/L (ref 136–145)
WBC # BLD AUTO: 5.99 K/UL (ref 3.9–12.7)

## 2023-10-11 PROCEDURE — 36415 COLL VENOUS BLD VENIPUNCTURE: CPT | Mod: PN | Performed by: STUDENT IN AN ORGANIZED HEALTH CARE EDUCATION/TRAINING PROGRAM

## 2023-10-11 PROCEDURE — 85025 COMPLETE CBC W/AUTO DIFF WBC: CPT | Performed by: STUDENT IN AN ORGANIZED HEALTH CARE EDUCATION/TRAINING PROGRAM

## 2023-10-11 PROCEDURE — 80053 COMPREHEN METABOLIC PANEL: CPT | Performed by: STUDENT IN AN ORGANIZED HEALTH CARE EDUCATION/TRAINING PROGRAM

## 2023-10-12 ENCOUNTER — OFFICE VISIT (OUTPATIENT)
Dept: HEMATOLOGY/ONCOLOGY | Facility: CLINIC | Age: 48
End: 2023-10-12

## 2023-10-12 VITALS
RESPIRATION RATE: 17 BRPM | HEIGHT: 60 IN | TEMPERATURE: 98 F | DIASTOLIC BLOOD PRESSURE: 83 MMHG | WEIGHT: 179.81 LBS | BODY MASS INDEX: 35.3 KG/M2 | HEART RATE: 98 BPM | SYSTOLIC BLOOD PRESSURE: 156 MMHG | OXYGEN SATURATION: 99 %

## 2023-10-12 DIAGNOSIS — D50.9 IRON DEFICIENCY ANEMIA, UNSPECIFIED IRON DEFICIENCY ANEMIA TYPE: ICD-10-CM

## 2023-10-12 DIAGNOSIS — C92.10 CML (CHRONIC MYELOCYTIC LEUKEMIA): Primary | ICD-10-CM

## 2023-10-12 DIAGNOSIS — D53.9 MACROCYTIC ANEMIA: ICD-10-CM

## 2023-10-12 PROCEDURE — 99213 OFFICE O/P EST LOW 20 MIN: CPT | Mod: PBBFAC | Performed by: STUDENT IN AN ORGANIZED HEALTH CARE EDUCATION/TRAINING PROGRAM

## 2023-10-12 PROCEDURE — 99214 OFFICE O/P EST MOD 30 MIN: CPT | Mod: S$PBB,,, | Performed by: STUDENT IN AN ORGANIZED HEALTH CARE EDUCATION/TRAINING PROGRAM

## 2023-10-12 PROCEDURE — 99999 PR PBB SHADOW E&M-EST. PATIENT-LVL III: ICD-10-PCS | Mod: PBBFAC,,, | Performed by: STUDENT IN AN ORGANIZED HEALTH CARE EDUCATION/TRAINING PROGRAM

## 2023-10-12 PROCEDURE — 99214 PR OFFICE/OUTPT VISIT, EST, LEVL IV, 30-39 MIN: ICD-10-PCS | Mod: S$PBB,,, | Performed by: STUDENT IN AN ORGANIZED HEALTH CARE EDUCATION/TRAINING PROGRAM

## 2023-10-12 PROCEDURE — 99999 PR PBB SHADOW E&M-EST. PATIENT-LVL III: CPT | Mod: PBBFAC,,, | Performed by: STUDENT IN AN ORGANIZED HEALTH CARE EDUCATION/TRAINING PROGRAM

## 2023-10-12 RX ORDER — IMATINIB MESYLATE 100 MG/1
200 TABLET, FILM COATED ORAL DAILY
Qty: 60 TABLET | Refills: 3 | Status: SHIPPED | OUTPATIENT
Start: 2023-10-12

## 2023-10-12 NOTE — PROGRESS NOTES
BMT Chart Routing  Urgent    Follow up with physician 3 months.   Follow up with MICHELL    Provider visit type    Infusion scheduling note    Injection scheduling note    Labs   Scheduling:  Preferred lab:  Lab interval:  CBC monthly. CBC, CMP, bcr-abl in 3 months on 12/06   Imaging    Pharmacy appointment    Other referrals                 HEMATOLOGY ONCOLOGY FELLOW CLINIC    IDENTIFYING STATEMENT   Audie Sandoval (Audie) is a 47 y.o. female with history of hypertension who presents to clinic for further evaluation and management of her leukocytosis    HISTORY OF PRESENT ILLNESS:    Patient initially alerted to her elevated WBC count when she got lab work through [facility]. After having blood drawn she received her results about one week later which showed WBC count of 120k. She discussed this with a friend of her's who is a nurse who encouraged her to be evaluated in emergency department. She presented to [facility] where hematology team was alerted by ED physician out of concern for acute leukemia. Hematology team evaluated patient's ED lab work. WBC count of 129 and mild anemia with Hg of 11 but no thrombocytopenia. CMP grossly wnl. Elevated LDH to 817, and elevated uric acid to 6.9. Peripheral smear reviewed with findings of arked leukocytosis with a granulocytic predominance.   Granulocytes are left shifted to the myelocyte stage.  Occasional blasts and promyelocytes are present, estimated less than 5%. Basophils are increased. Patient evaluated by hematology at bedside. Physical exam unremarkable and patient denies major systemic symptoms. Likely diagnosis of CML (chronic phase) was made with discharge directly from ED to clinic on 3/2 for further work up and management.     Bone marrow pathology significant for 100% cellularity with myeloid predominance (M/E= 11:1) consistent with chronic myeloid leukemia, bcr-abl 1 positive, chronic phase. BCR/ABL1 p210 mRNA 27%.    Patient started imatinib  on 3/26/23  --------------------------------------------------------------------------------------------------------------------------------------------------------------------------------------------------  Patient presents to clinic for follow up. She has been on imatinib for 6 months now at a dose of 200 mg. She finds this dose more tolerable in terms of side effects. She still reports mild nausea and some facial swelling, but this is improved from previous exams. No other systemic side effects.       Past Medical History:   Diagnosis Date    Allergy     Gestational hypertension        Family History   Problem Relation Age of Onset    Hypertension Mother     Arthritis Mother         DJD spine    Abnormal EKG Father     Diabetes Father        Social History     Socioeconomic History    Marital status:    Tobacco Use    Smoking status: Never    Smokeless tobacco: Never   Substance and Sexual Activity    Alcohol use: Yes     Comment: Rare    Drug use: Never    Sexual activity: Yes     Partners: Male     Social Determinants of Health     Financial Resource Strain: Low Risk  (2/10/2021)    Overall Financial Resource Strain ([facility])     Difficulty of Paying Living Expenses: Not hard at all   Food Insecurity: No Food Insecurity (2/10/2021)    Hunger Vital Sign     Worried About Running Out of Food in the Last Year: Never true     Ran Out of Food in the Last Year: Never true   Transportation Needs: No Transportation Needs (2/10/2021)    PRAPARE - Transportation     Lack of Transportation (Medical): No     Lack of Transportation (Non-Medical): No   Physical Activity: Sufficiently Active (2/10/2021)    Exercise Vital Sign     Days of Exercise per Week: 3 days     Minutes of Exercise per Session: 60 min   Stress: Stress Concern Present (2/10/2021)    [employer] - Occupational Stress Questionnaire     Feeling of Stress : To some extent   Social Connections: Unknown (2/10/2021)    Social  Connection and Isolation Panel [NHANES]     Frequency of Communication with Friends and Family: More than three times a week     Frequency of Social Gatherings with Friends and Family: Once a week     Active Member of Clubs or Organizations: Yes     Attends Club or Organization Meetings: More than 4 times per year     Marital Status:          MEDICATIONS:     Current Outpatient Medications on File Prior to Visit   Medication Sig Dispense Refill    acyclovir (ZOVIRAX) 400 MG tablet Take 400 mg by mouth 2 (two) times daily.      ALBUTEROL, REFILL, INHL       azithromycin (Z-NOHEMY) 250 MG tablet Take 1 tablet (250 mg total) by mouth once daily. Take first 2 tablets together, then 1 every day until finished. (Patient not taking: Reported on 4/20/2023) 6 tablet 0    imatinib (GLEEVEC) 100 MG Tab Take 3 tablets (300 mg total) by mouth once daily. 90 tablet 3     No current facility-administered medications on file prior to visit.       ALLERGIES:   Review of patient's allergies indicates:   Allergen Reactions    Codeine     Wheat containing prod         ROS:       Review of Systems   Constitutional:  Negative for appetite change, chills, fatigue, fever and unexpected weight change.   HENT:   Negative for sore throat and trouble swallowing.    Respiratory:  Negative for cough, shortness of breath and wheezing.    Cardiovascular:  Negative for leg swelling and palpitations.   Gastrointestinal:  Negative for abdominal pain, diarrhea and nausea.   Genitourinary:  Negative for dysuria and frequency.    Musculoskeletal:  Negative for arthralgias and myalgias.   Skin:  Negative for rash.   Neurological:  Negative for dizziness, headaches and light-headedness.   Hematological:  Negative for adenopathy. Does not bruise/bleed easily.   Psychiatric/Behavioral:  Negative for confusion and decreased concentration. The patient is nervous/anxious.        PHYSICAL EXAM:  Vitals:    10/12/23 1056   BP: (!) 156/83   Pulse: 98   Resp:  17   Temp: 98.4 °F (36.9 °C)   TempSrc: Oral   SpO2: 99%   Weight: 81.5 kg (179 lb 12.6 oz)   Height: 5' (1.524 m)   PainSc: 0-No pain       Physical Exam  Vitals reviewed.   Constitutional:       General: She is not in acute distress.     Appearance: She is well-developed. She is not toxic-appearing.   HENT:      Head: Normocephalic and atraumatic.      Right Ear: External ear normal.      Left Ear: External ear normal.      Nose: Nose normal.      Mouth/Throat:      Mouth: Mucous membranes are moist.      Pharynx: Oropharynx is clear. No oropharyngeal exudate.   Eyes:      General: No scleral icterus.     Extraocular Movements: Extraocular movements intact.      Conjunctiva/sclera: Conjunctivae normal.      Pupils: Pupils are equal, round, and reactive to light.   Cardiovascular:      Rate and Rhythm: Normal rate and regular rhythm.      Heart sounds: Normal heart sounds. No murmur heard.  Pulmonary:      Effort: Pulmonary effort is normal.      Breath sounds: Normal breath sounds. No wheezing or rales.   Abdominal:      General: Bowel sounds are normal. There is no distension.      Palpations: Abdomen is soft.      Tenderness: There is no abdominal tenderness.   Musculoskeletal:         General: No deformity. Normal range of motion.      Cervical back: Normal range of motion and neck supple.      Right lower leg: No edema.      Left lower leg: No edema.   Lymphadenopathy:      Cervical: No cervical adenopathy.   Skin:     General: Skin is warm and dry.   Neurological:      Mental Status: She is alert and oriented to person, place, and time.   Psychiatric:         Behavior: Behavior normal.         LAB:   Results for orders placed or performed in visit on 10/11/23   CBC W/ AUTO DIFFERENTIAL   Result Value Ref Range    WBC 5.99 3.90 - 12.70 K/uL    RBC 4.07 4.00 - 5.40 M/uL    Hemoglobin 13.1 12.0 - 16.0 g/dL    Hematocrit 40.6 37.0 - 48.5 %     (H) 82 - 98 fL    MCH 32.2 (H) 27.0 - 31.0 pg    MCHC 32.3 32.0  - 36.0 g/dL    RDW 13.3 11.5 - 14.5 %    Platelets 196 150 - 450 K/uL    MPV 11.4 9.2 - 12.9 fL    Immature Granulocytes 0.2 0.0 - 0.5 %    Gran # (ANC) 4.1 1.8 - 7.7 K/uL    Immature Grans (Abs) 0.01 0.00 - 0.04 K/uL    Lymph # 1.5 1.0 - 4.8 K/uL    Mono # 0.2 (L) 0.3 - 1.0 K/uL    Eos # 0.1 0.0 - 0.5 K/uL    Baso # 0.01 0.00 - 0.20 K/uL    nRBC 0 0 /100 WBC    Gran % 68.9 38.0 - 73.0 %    Lymph % 25.5 18.0 - 48.0 %    Mono % 3.7 (L) 4.0 - 15.0 %    Eosinophil % 1.5 0.0 - 8.0 %    Basophil % 0.2 0.0 - 1.9 %    Differential Method Automated    COMPREHENSIVE METABOLIC PANEL   Result Value Ref Range    Sodium 137 136 - 145 mmol/L    Potassium 4.5 3.5 - 5.1 mmol/L    Chloride 107 95 - 110 mmol/L    CO2 22 (L) 23 - 29 mmol/L    Glucose 91 70 - 110 mg/dL    BUN 13 6 - 20 mg/dL    Creatinine 0.7 0.5 - 1.4 mg/dL    Calcium 9.7 8.7 - 10.5 mg/dL    Total Protein 7.0 6.0 - 8.4 g/dL    Albumin 4.0 3.5 - 5.2 g/dL    Total Bilirubin 0.4 0.1 - 1.0 mg/dL    Alkaline Phosphatase 63 55 - 135 U/L    AST 15 10 - 40 U/L    ALT 14 10 - 44 U/L    eGFR >60.0 >60 mL/min/1.73 m^2    Anion Gap 8 8 - 16 mmol/L       PROBLEMS ASSESSED THIS VISIT:    1. CML (chronic myelocytic leukemia)    2. Iron deficiency anemia, unspecified iron deficiency anemia type    3. Macrocytic anemia        ASSESSMENT AND PLAN    1  47 year old female with minimal past medical history who presented to hospital with elevated WBC count. WBC count of 129 and mild anemia with Hg of 11 but no thrombocytopenia. CMP grossly wnl. Elevated LDH to 817, and elevated uric acid to 6.9. Peripheral smear reviewed with findings of arked leukocytosis with a granulocytic predominance. Granulocytes are left shifted to the myelocyte stage.  Occasional blasts and promyelocytes are present, estimated less than 5%. Basophils are increased. Her presentation, initial lab work and clinical presentation indicative of CML chronic phase.     Bone marrow biopsy done on 3/08. Bone marrow  pathology significant for 100% cellularity with myeloid predominance (M/E= 11:1) consistent with chronic myeloid leukemia, bcr-abl 1 positive, chronic phase; chromosomal analysis pending. BCR/ABL1 p210 mRNA 27%. Low risk per Sokal score    - Currently taking imatinib 200 mg daily. Continue to monitor for possible side effects (fluid retention, nausea, etc)  - No significant cytopenia noted during initiation of imatinib. CBC monthly. Cbc, cmp, bcr-abl in 3 months  - Follow up with me in person in 3 month  - 9/06 bcr-abl .4%    2,3  - anemia resolved on most recent labs   - continue po ferrous sulfate    Discussed with Dr. Brianda Oviedo MD  PGY-V  Hematology/Oncology Fellow

## 2023-11-15 ENCOUNTER — LAB VISIT (OUTPATIENT)
Dept: LAB | Facility: HOSPITAL | Age: 48
End: 2023-11-15
Attending: FAMILY MEDICINE

## 2023-11-15 DIAGNOSIS — D50.9 IRON DEFICIENCY ANEMIA, UNSPECIFIED IRON DEFICIENCY ANEMIA TYPE: ICD-10-CM

## 2023-11-15 DIAGNOSIS — C92.10 CML (CHRONIC MYELOCYTIC LEUKEMIA): ICD-10-CM

## 2023-11-15 LAB
BASOPHILS # BLD AUTO: 0.01 K/UL (ref 0–0.2)
BASOPHILS NFR BLD: 0.2 % (ref 0–1.9)
DIFFERENTIAL METHOD: ABNORMAL
EOSINOPHIL # BLD AUTO: 0.1 K/UL (ref 0–0.5)
EOSINOPHIL NFR BLD: 1.6 % (ref 0–8)
ERYTHROCYTE [DISTWIDTH] IN BLOOD BY AUTOMATED COUNT: 12.8 % (ref 11.5–14.5)
HCT VFR BLD AUTO: 38.1 % (ref 37–48.5)
HGB BLD-MCNC: 12.7 G/DL (ref 12–16)
IMM GRANULOCYTES # BLD AUTO: 0.01 K/UL (ref 0–0.04)
IMM GRANULOCYTES NFR BLD AUTO: 0.2 % (ref 0–0.5)
LYMPHOCYTES # BLD AUTO: 1.4 K/UL (ref 1–4.8)
LYMPHOCYTES NFR BLD: 24.2 % (ref 18–48)
MCH RBC QN AUTO: 31.9 PG (ref 27–31)
MCHC RBC AUTO-ENTMCNC: 33.3 G/DL (ref 32–36)
MCV RBC AUTO: 96 FL (ref 82–98)
MONOCYTES # BLD AUTO: 0.3 K/UL (ref 0.3–1)
MONOCYTES NFR BLD: 4.3 % (ref 4–15)
NEUTROPHILS # BLD AUTO: 4 K/UL (ref 1.8–7.7)
NEUTROPHILS NFR BLD: 69.5 % (ref 38–73)
NRBC BLD-RTO: 0 /100 WBC
PLATELET # BLD AUTO: 209 K/UL (ref 150–450)
PMV BLD AUTO: 10.6 FL (ref 9.2–12.9)
RBC # BLD AUTO: 3.98 M/UL (ref 4–5.4)
WBC # BLD AUTO: 5.75 K/UL (ref 3.9–12.7)

## 2023-11-15 PROCEDURE — 85025 COMPLETE CBC W/AUTO DIFF WBC: CPT | Performed by: STUDENT IN AN ORGANIZED HEALTH CARE EDUCATION/TRAINING PROGRAM

## 2023-11-15 PROCEDURE — 36415 COLL VENOUS BLD VENIPUNCTURE: CPT | Mod: PN | Performed by: STUDENT IN AN ORGANIZED HEALTH CARE EDUCATION/TRAINING PROGRAM

## 2023-12-06 ENCOUNTER — LAB VISIT (OUTPATIENT)
Dept: LAB | Facility: HOSPITAL | Age: 48
End: 2023-12-06
Attending: FAMILY MEDICINE

## 2023-12-06 DIAGNOSIS — C92.10 CML (CHRONIC MYELOCYTIC LEUKEMIA): ICD-10-CM

## 2023-12-06 DIAGNOSIS — D50.9 IRON DEFICIENCY ANEMIA, UNSPECIFIED IRON DEFICIENCY ANEMIA TYPE: ICD-10-CM

## 2023-12-06 LAB
BASOPHILS # BLD AUTO: 0.02 K/UL (ref 0–0.2)
BASOPHILS NFR BLD: 0.3 % (ref 0–1.9)
DIFFERENTIAL METHOD: ABNORMAL
EOSINOPHIL # BLD AUTO: 0.1 K/UL (ref 0–0.5)
EOSINOPHIL NFR BLD: 1.4 % (ref 0–8)
ERYTHROCYTE [DISTWIDTH] IN BLOOD BY AUTOMATED COUNT: 13.1 % (ref 11.5–14.5)
HCT VFR BLD AUTO: 39.5 % (ref 37–48.5)
HGB BLD-MCNC: 13.1 G/DL (ref 12–16)
IMM GRANULOCYTES # BLD AUTO: 0 K/UL (ref 0–0.04)
IMM GRANULOCYTES NFR BLD AUTO: 0 % (ref 0–0.5)
LYMPHOCYTES # BLD AUTO: 1.4 K/UL (ref 1–4.8)
LYMPHOCYTES NFR BLD: 21.7 % (ref 18–48)
MCH RBC QN AUTO: 31.8 PG (ref 27–31)
MCHC RBC AUTO-ENTMCNC: 33.2 G/DL (ref 32–36)
MCV RBC AUTO: 96 FL (ref 82–98)
MONOCYTES # BLD AUTO: 0.3 K/UL (ref 0.3–1)
MONOCYTES NFR BLD: 4.1 % (ref 4–15)
NEUTROPHILS # BLD AUTO: 4.6 K/UL (ref 1.8–7.7)
NEUTROPHILS NFR BLD: 72.5 % (ref 38–73)
NRBC BLD-RTO: 0 /100 WBC
PLATELET # BLD AUTO: 199 K/UL (ref 150–450)
PMV BLD AUTO: 11.4 FL (ref 9.2–12.9)
RBC # BLD AUTO: 4.12 M/UL (ref 4–5.4)
WBC # BLD AUTO: 6.31 K/UL (ref 3.9–12.7)

## 2023-12-06 PROCEDURE — 81206 BCR/ABL1 GENE MAJOR BP: CPT | Performed by: STUDENT IN AN ORGANIZED HEALTH CARE EDUCATION/TRAINING PROGRAM

## 2023-12-06 PROCEDURE — 85025 COMPLETE CBC W/AUTO DIFF WBC: CPT | Performed by: STUDENT IN AN ORGANIZED HEALTH CARE EDUCATION/TRAINING PROGRAM

## 2023-12-11 LAB
BCR/ABL,P210 RESULT: NORMAL
PATH REPORT.FINAL DX SPEC: NORMAL
SPECIMEN TYPE: NORMAL

## 2023-12-12 ENCOUNTER — PATIENT MESSAGE (OUTPATIENT)
Dept: HEMATOLOGY/ONCOLOGY | Facility: CLINIC | Age: 48
End: 2023-12-12

## 2023-12-12 ENCOUNTER — OFFICE VISIT (OUTPATIENT)
Dept: URGENT CARE | Facility: CLINIC | Age: 48
End: 2023-12-12

## 2023-12-12 ENCOUNTER — PATIENT MESSAGE (OUTPATIENT)
Dept: INTERNAL MEDICINE | Facility: CLINIC | Age: 48
End: 2023-12-12

## 2023-12-12 VITALS
TEMPERATURE: 99 F | OXYGEN SATURATION: 99 % | BODY MASS INDEX: 35.14 KG/M2 | HEIGHT: 60 IN | WEIGHT: 179 LBS | HEART RATE: 113 BPM | RESPIRATION RATE: 18 BRPM | SYSTOLIC BLOOD PRESSURE: 188 MMHG | DIASTOLIC BLOOD PRESSURE: 106 MMHG

## 2023-12-12 DIAGNOSIS — I10 HYPERTENSION, UNSPECIFIED TYPE: Primary | ICD-10-CM

## 2023-12-12 DIAGNOSIS — C92.10 CML (CHRONIC MYELOCYTIC LEUKEMIA): ICD-10-CM

## 2023-12-12 PROCEDURE — 99213 PR OFFICE/OUTPT VISIT, EST, LEVL III, 20-29 MIN: ICD-10-PCS | Mod: S$GLB,,, | Performed by: FAMILY MEDICINE

## 2023-12-12 PROCEDURE — 99213 OFFICE O/P EST LOW 20 MIN: CPT | Mod: S$GLB,,, | Performed by: FAMILY MEDICINE

## 2023-12-12 RX ORDER — AMLODIPINE BESYLATE 10 MG/1
5 TABLET ORAL DAILY
Qty: 30 TABLET | Refills: 0 | Status: SHIPPED | OUTPATIENT
Start: 2023-12-12 | End: 2023-12-12

## 2023-12-12 RX ORDER — AMLODIPINE BESYLATE 5 MG/1
5 TABLET ORAL DAILY
Qty: 30 TABLET | Refills: 0 | Status: SHIPPED | OUTPATIENT
Start: 2023-12-12 | End: 2024-01-30

## 2023-12-12 RX ORDER — AMLODIPINE BESYLATE 10 MG/1
10 TABLET ORAL DAILY
Qty: 30 TABLET | Refills: 0 | Status: SHIPPED | OUTPATIENT
Start: 2023-12-12 | End: 2023-12-12 | Stop reason: SDUPTHER

## 2023-12-12 NOTE — TELEPHONE ENCOUNTER
Pt states she has been diagnosed with CML, she is feeling ok but her blood pressure has been elevated 150/109 and think she needs to go on Bp meds(amlodipine) she wants to know if she could have something now sent to [facility]. She will be booking appointment asap.

## 2023-12-12 NOTE — PROGRESS NOTES
Subjective:      Patient ID: Audie Sandoval is a 48 y.o. female.    Vitals:  vitals were not taken for this visit.     Chief Complaint: Hypertension  Pt is out of her blood pressure medication for 2 years now  Patient' started that her B/P has been elevated for about a weeks.  Patient has been off her medication for about two years.     Hypertension  This is a new problem. The current episode started 1 to 4 weeks ago. The problem is unchanged. The problem is controlled. Associated symptoms include headaches. Past treatments include calcium channel blockers. The current treatment provides moderate improvement.     Neurological:  Positive for headaches.    Objective:     Physical Exam   Constitutional: She is oriented to person, place, and time. No distress. obesity  HENT:   Head: Normocephalic and atraumatic.   Ears:   Right Ear: Tympanic membrane, external ear and ear canal normal.   Left Ear: Tympanic membrane, external ear and ear canal normal.   Nose: No rhinorrhea or congestion.   Mouth/Throat: Mucous membranes are moist. No oropharyngeal exudate or posterior oropharyngeal erythema. Oropharynx is clear.   Eyes: Conjunctivae are normal. Pupils are equal, round, and reactive to light. Extraocular movement intact   Neck: Neck supple.   Cardiovascular: Normal rate, regular rhythm, normal heart sounds and normal pulses.   Pulmonary/Chest: Effort normal and breath sounds normal. No respiratory distress.   Abdominal: Normal appearance and bowel sounds are normal. Soft. flat abdomen   Musculoskeletal: Normal range of motion.         General: Normal range of motion.   Neurological: no focal deficit. She is alert, oriented to person, place, and time and at baseline.   Skin: Skin is warm and dry. Capillary refill takes less than 2 seconds. No bruising   Psychiatric: Her behavior is normal. Mood, judgment and thought content normal.   Nursing note and vitals reviewed.    Assessment:     Plan:     1. Hypertension,  unspecified type  - amLODIPine (NORVASC) 5 MG tablet; Take 1 tablet (5 mg total) by mouth once daily.  Dispense: 30 tablet; Refill: 0   2. Hx of CMLpt has f/u pcp next week for bp check

## 2023-12-12 NOTE — TELEPHONE ENCOUNTER
Unfortunately, I have not seen the patient since 2021.  She will need to be seen for a physical exam and evaluation before any medication is prescribed.  She can be scheduled with another provider for blood pressure evaluation if I do not have an earlier appointment.  Thank you.

## 2023-12-18 NOTE — PROGRESS NOTES
Subjective:       Patient ID: Audie Sandoval is a 48 y.o. female.    Chief Complaint: Hypertension, Annual Exam, and Nasal Congestion      Patient is a 48 y.o. female who traditionally follows with Adonay Neri MD presenting today for blood pressure follow up.     Patient was seen in Urgent Care on 12/12 for /106 having headaches. She was started on 5 mg Norvasc at that time.     Review of patient's allergies indicates:   Allergen Reactions    Codeine     Wheat containing prod        Medication List with Changes/Refills   New Medications    LOSARTAN (COZAAR) 25 MG TABLET    Take 0.5 tablets (12.5 mg total) by mouth once daily.   Current Medications    ACYCLOVIR (ZOVIRAX) 400 MG TABLET    Take 400 mg by mouth 2 (two) times daily.    ALBUTEROL, REFILL, INHL        AMLODIPINE (NORVASC) 5 MG TABLET    Take 1 tablet (5 mg total) by mouth once daily.    IMATINIB (GLEEVEC) 100 MG TAB    Take 2 tablets (200 mg total) by mouth once daily.     Medical, social and surgical history has been reviewed with the patient.     Review of Systems   Constitutional:  Negative for chills and fever.   Respiratory:  Negative for cough and shortness of breath.    Cardiovascular:  Negative for chest pain.   Neurological:  Negative for dizziness and headaches.        Objective:   BP (!) 148/94 (BP Location: Right arm, Patient Position: Sitting, BP Method: Medium (Manual))   Pulse 82   Temp 98.2 °F (36.8 °C) (Temporal)   Resp 18   Ht 5' (1.524 m)   Wt 81.8 kg (180 lb 5.4 oz)   LMP 12/09/2023 (Exact Date)   SpO2 98%   BMI 35.22 kg/m²       Physical Exam  Vitals reviewed.   Constitutional:       Appearance: Normal appearance.   HENT:      Head: Normocephalic and atraumatic.   Cardiovascular:      Rate and Rhythm: Normal rate and regular rhythm.      Heart sounds: Normal heart sounds. No murmur heard.  Pulmonary:      Effort: Pulmonary effort is normal.      Breath sounds: Normal breath sounds. No wheezing.   Skin:      General: Skin is warm and dry.   Neurological:      Mental Status: She is alert and oriented to person, place, and time.         I reviewed and independently interpreted the labs and imaging below:  Last Labs:  Glucose   Date Value Ref Range Status   12/26/2023 89 70 - 110 mg/dL Final   10/11/2023 91 70 - 110 mg/dL Final     BUN   Date Value Ref Range Status   12/26/2023 8 6 - 20 mg/dL Final   10/11/2023 13 6 - 20 mg/dL Final     Creatinine   Date Value Ref Range Status   12/26/2023 0.7 0.5 - 1.4 mg/dL Final   10/11/2023 0.7 0.5 - 1.4 mg/dL Final     Cholesterol   Date Value Ref Range Status   12/15/2020 181 120 - 199 mg/dL Final     Comment:     The National Cholesterol Education Program (NCEP) has set the  following guidelines (reference ranges) for Cholesterol:  Optimal.....................<200 mg/dL  Borderline High.............200-239 mg/dL  High........................> or = 240 mg/dL       Hemoglobin A1C   Date Value Ref Range Status   12/15/2020 5.3 4.0 - 5.6 % Final     Comment:     ADA Screening Guidelines:  5.7-6.4%  Consistent with prediabetes  >or=6.5%  Consistent with diabetes  High levels of fetal hemoglobin interfere with the HbA1C  assay. Heterozygous hemoglobin variants (HbS, HgC, etc)do  not significantly interfere with this assay.   However, presence of multiple variants may affect accuracy.       Hemoglobin   Date Value Ref Range Status   12/06/2023 13.1 12.0 - 16.0 g/dL Final   11/15/2023 12.7 12.0 - 16.0 g/dL Final     Hematocrit   Date Value Ref Range Status   12/06/2023 39.5 37.0 - 48.5 % Final   11/15/2023 38.1 37.0 - 48.5 % Final       Assessment and Plan:     1. Essential hypertension    Chronic, elevated, continue 5 mg Norvasc and add Losartan. Continue to monitor blood pressure at home.     - losartan (COZAAR) 25 MG tablet; Take 0.5 tablets (12.5 mg total) by mouth once daily.  Dispense: 30 tablet; Refill: 11  - TSH; Future  - Lipid Panel; Future  - COMPREHENSIVE METABOLIC PANEL;  Future

## 2023-12-19 ENCOUNTER — OFFICE VISIT (OUTPATIENT)
Dept: INTERNAL MEDICINE | Facility: CLINIC | Age: 48
End: 2023-12-19

## 2023-12-19 VITALS
RESPIRATION RATE: 18 BRPM | SYSTOLIC BLOOD PRESSURE: 148 MMHG | HEART RATE: 82 BPM | BODY MASS INDEX: 35.4 KG/M2 | WEIGHT: 180.31 LBS | DIASTOLIC BLOOD PRESSURE: 94 MMHG | TEMPERATURE: 98 F | HEIGHT: 60 IN | OXYGEN SATURATION: 98 %

## 2023-12-19 DIAGNOSIS — I10 ESSENTIAL HYPERTENSION: Primary | ICD-10-CM

## 2023-12-19 PROCEDURE — 99214 OFFICE O/P EST MOD 30 MIN: CPT | Mod: PBBFAC,PO | Performed by: NURSE PRACTITIONER

## 2023-12-19 PROCEDURE — 99999 PR PBB SHADOW E&M-EST. PATIENT-LVL IV: CPT | Mod: PBBFAC,,, | Performed by: NURSE PRACTITIONER

## 2023-12-19 PROCEDURE — 99214 OFFICE O/P EST MOD 30 MIN: CPT | Mod: S$PBB,,, | Performed by: NURSE PRACTITIONER

## 2023-12-19 PROCEDURE — 99214 PR OFFICE/OUTPT VISIT, EST, LEVL IV, 30-39 MIN: ICD-10-PCS | Mod: S$PBB,,, | Performed by: NURSE PRACTITIONER

## 2023-12-19 PROCEDURE — 99999 PR PBB SHADOW E&M-EST. PATIENT-LVL IV: ICD-10-PCS | Mod: PBBFAC,,, | Performed by: NURSE PRACTITIONER

## 2023-12-19 RX ORDER — LOSARTAN POTASSIUM 25 MG/1
12.5 TABLET ORAL DAILY
Qty: 30 TABLET | Refills: 11 | Status: SHIPPED | OUTPATIENT
Start: 2023-12-19 | End: 2024-12-18

## 2023-12-26 ENCOUNTER — LAB VISIT (OUTPATIENT)
Dept: LAB | Facility: HOSPITAL | Age: 48
End: 2023-12-26
Attending: FAMILY MEDICINE

## 2023-12-26 ENCOUNTER — PATIENT MESSAGE (OUTPATIENT)
Dept: INTERNAL MEDICINE | Facility: CLINIC | Age: 48
End: 2023-12-26

## 2023-12-26 DIAGNOSIS — I10 ESSENTIAL HYPERTENSION: ICD-10-CM

## 2023-12-26 LAB
ALBUMIN SERPL BCP-MCNC: 3.7 G/DL (ref 3.5–5.2)
ALP SERPL-CCNC: 56 U/L (ref 55–135)
ALT SERPL W/O P-5'-P-CCNC: 21 U/L (ref 10–44)
ANION GAP SERPL CALC-SCNC: 7 MMOL/L (ref 8–16)
AST SERPL-CCNC: 17 U/L (ref 10–40)
BILIRUB SERPL-MCNC: 0.4 MG/DL (ref 0.1–1)
BUN SERPL-MCNC: 8 MG/DL (ref 6–20)
CALCIUM SERPL-MCNC: 8.6 MG/DL (ref 8.7–10.5)
CHLORIDE SERPL-SCNC: 106 MMOL/L (ref 95–110)
CO2 SERPL-SCNC: 24 MMOL/L (ref 23–29)
CREAT SERPL-MCNC: 0.7 MG/DL (ref 0.5–1.4)
EST. GFR  (NO RACE VARIABLE): >60 ML/MIN/1.73 M^2
GLUCOSE SERPL-MCNC: 89 MG/DL (ref 70–110)
POTASSIUM SERPL-SCNC: 4.4 MMOL/L (ref 3.5–5.1)
PROT SERPL-MCNC: 6.2 G/DL (ref 6–8.4)
SODIUM SERPL-SCNC: 137 MMOL/L (ref 136–145)

## 2023-12-26 PROCEDURE — 80053 COMPREHEN METABOLIC PANEL: CPT | Performed by: NURSE PRACTITIONER

## 2023-12-26 PROCEDURE — 36415 COLL VENOUS BLD VENIPUNCTURE: CPT | Mod: PN | Performed by: NURSE PRACTITIONER

## 2024-01-18 ENCOUNTER — LAB VISIT (OUTPATIENT)
Dept: LAB | Facility: HOSPITAL | Age: 49
End: 2024-01-18
Attending: FAMILY MEDICINE
Payer: COMMERCIAL

## 2024-01-18 DIAGNOSIS — C92.10 CML (CHRONIC MYELOCYTIC LEUKEMIA): ICD-10-CM

## 2024-01-18 DIAGNOSIS — I10 ESSENTIAL HYPERTENSION: ICD-10-CM

## 2024-01-18 DIAGNOSIS — D50.9 IRON DEFICIENCY ANEMIA, UNSPECIFIED IRON DEFICIENCY ANEMIA TYPE: ICD-10-CM

## 2024-01-18 LAB
BASOPHILS # BLD AUTO: 0.02 K/UL (ref 0–0.2)
BASOPHILS NFR BLD: 0.4 % (ref 0–1.9)
CHOLEST SERPL-MCNC: 203 MG/DL (ref 120–199)
CHOLEST/HDLC SERPL: 4.4 {RATIO} (ref 2–5)
DIFFERENTIAL METHOD BLD: ABNORMAL
EOSINOPHIL # BLD AUTO: 0.1 K/UL (ref 0–0.5)
EOSINOPHIL NFR BLD: 2.5 % (ref 0–8)
ERYTHROCYTE [DISTWIDTH] IN BLOOD BY AUTOMATED COUNT: 13.2 % (ref 11.5–14.5)
HCT VFR BLD AUTO: 38.7 % (ref 37–48.5)
HDLC SERPL-MCNC: 46 MG/DL (ref 40–75)
HDLC SERPL: 22.7 % (ref 20–50)
HGB BLD-MCNC: 12.6 G/DL (ref 12–16)
IMM GRANULOCYTES # BLD AUTO: 0.01 K/UL (ref 0–0.04)
IMM GRANULOCYTES NFR BLD AUTO: 0.2 % (ref 0–0.5)
LDLC SERPL CALC-MCNC: 126.4 MG/DL (ref 63–159)
LYMPHOCYTES # BLD AUTO: 1.5 K/UL (ref 1–4.8)
LYMPHOCYTES NFR BLD: 26.1 % (ref 18–48)
MCH RBC QN AUTO: 31.7 PG (ref 27–31)
MCHC RBC AUTO-ENTMCNC: 32.6 G/DL (ref 32–36)
MCV RBC AUTO: 97 FL (ref 82–98)
MONOCYTES # BLD AUTO: 0.2 K/UL (ref 0.3–1)
MONOCYTES NFR BLD: 4 % (ref 4–15)
NEUTROPHILS # BLD AUTO: 3.8 K/UL (ref 1.8–7.7)
NEUTROPHILS NFR BLD: 66.8 % (ref 38–73)
NONHDLC SERPL-MCNC: 157 MG/DL
NRBC BLD-RTO: 0 /100 WBC
PLATELET # BLD AUTO: 223 K/UL (ref 150–450)
PMV BLD AUTO: 10.9 FL (ref 9.2–12.9)
RBC # BLD AUTO: 3.98 M/UL (ref 4–5.4)
TRIGL SERPL-MCNC: 153 MG/DL (ref 30–150)
TSH SERPL DL<=0.005 MIU/L-ACNC: 1.81 UIU/ML (ref 0.4–4)
WBC # BLD AUTO: 5.68 K/UL (ref 3.9–12.7)

## 2024-01-18 PROCEDURE — 80061 LIPID PANEL: CPT | Performed by: NURSE PRACTITIONER

## 2024-01-18 PROCEDURE — 85025 COMPLETE CBC W/AUTO DIFF WBC: CPT | Performed by: STUDENT IN AN ORGANIZED HEALTH CARE EDUCATION/TRAINING PROGRAM

## 2024-01-18 PROCEDURE — 84443 ASSAY THYROID STIM HORMONE: CPT | Performed by: NURSE PRACTITIONER

## 2024-01-18 PROCEDURE — 36415 COLL VENOUS BLD VENIPUNCTURE: CPT | Mod: PN | Performed by: NURSE PRACTITIONER

## 2024-01-29 DIAGNOSIS — C92.10 CML (CHRONIC MYELOCYTIC LEUKEMIA): Primary | ICD-10-CM

## 2024-01-30 ENCOUNTER — OFFICE VISIT (OUTPATIENT)
Dept: HEMATOLOGY/ONCOLOGY | Facility: CLINIC | Age: 49
End: 2024-01-30
Payer: COMMERCIAL

## 2024-01-30 ENCOUNTER — LAB VISIT (OUTPATIENT)
Dept: LAB | Facility: HOSPITAL | Age: 49
End: 2024-01-30
Payer: COMMERCIAL

## 2024-01-30 VITALS
DIASTOLIC BLOOD PRESSURE: 88 MMHG | HEIGHT: 60 IN | SYSTOLIC BLOOD PRESSURE: 142 MMHG | OXYGEN SATURATION: 100 % | HEART RATE: 91 BPM | TEMPERATURE: 98 F | BODY MASS INDEX: 36.7 KG/M2 | WEIGHT: 186.94 LBS

## 2024-01-30 DIAGNOSIS — C92.10 CML (CHRONIC MYELOCYTIC LEUKEMIA): Primary | ICD-10-CM

## 2024-01-30 DIAGNOSIS — R11.2 CINV (CHEMOTHERAPY-INDUCED NAUSEA AND VOMITING): ICD-10-CM

## 2024-01-30 DIAGNOSIS — C92.10 CML (CHRONIC MYELOCYTIC LEUKEMIA): ICD-10-CM

## 2024-01-30 DIAGNOSIS — D53.9 MACROCYTIC ANEMIA: ICD-10-CM

## 2024-01-30 DIAGNOSIS — T45.1X5A CINV (CHEMOTHERAPY-INDUCED NAUSEA AND VOMITING): ICD-10-CM

## 2024-01-30 LAB
ALBUMIN SERPL BCP-MCNC: 4.1 G/DL (ref 3.5–5.2)
ALP SERPL-CCNC: 64 U/L (ref 55–135)
ALT SERPL W/O P-5'-P-CCNC: 24 U/L (ref 10–44)
ANION GAP SERPL CALC-SCNC: 5 MMOL/L (ref 8–16)
AST SERPL-CCNC: 17 U/L (ref 10–40)
BASOPHILS # BLD AUTO: 0.01 K/UL (ref 0–0.2)
BASOPHILS NFR BLD: 0.2 % (ref 0–1.9)
BILIRUB SERPL-MCNC: 0.4 MG/DL (ref 0.1–1)
BUN SERPL-MCNC: 10 MG/DL (ref 6–20)
CALCIUM SERPL-MCNC: 9.7 MG/DL (ref 8.7–10.5)
CHLORIDE SERPL-SCNC: 105 MMOL/L (ref 95–110)
CO2 SERPL-SCNC: 27 MMOL/L (ref 23–29)
CREAT SERPL-MCNC: 0.8 MG/DL (ref 0.5–1.4)
DIFFERENTIAL METHOD BLD: ABNORMAL
EOSINOPHIL # BLD AUTO: 0.1 K/UL (ref 0–0.5)
EOSINOPHIL NFR BLD: 1.5 % (ref 0–8)
ERYTHROCYTE [DISTWIDTH] IN BLOOD BY AUTOMATED COUNT: 13.2 % (ref 11.5–14.5)
EST. GFR  (NO RACE VARIABLE): >60 ML/MIN/1.73 M^2
GLUCOSE SERPL-MCNC: 90 MG/DL (ref 70–110)
HCT VFR BLD AUTO: 39.2 % (ref 37–48.5)
HGB BLD-MCNC: 13 G/DL (ref 12–16)
IMM GRANULOCYTES # BLD AUTO: 0.02 K/UL (ref 0–0.04)
IMM GRANULOCYTES NFR BLD AUTO: 0.3 % (ref 0–0.5)
LYMPHOCYTES # BLD AUTO: 1.7 K/UL (ref 1–4.8)
LYMPHOCYTES NFR BLD: 25.9 % (ref 18–48)
MCH RBC QN AUTO: 31.1 PG (ref 27–31)
MCHC RBC AUTO-ENTMCNC: 33.2 G/DL (ref 32–36)
MCV RBC AUTO: 94 FL (ref 82–98)
MONOCYTES # BLD AUTO: 0.4 K/UL (ref 0.3–1)
MONOCYTES NFR BLD: 5.7 % (ref 4–15)
NEUTROPHILS # BLD AUTO: 4.4 K/UL (ref 1.8–7.7)
NEUTROPHILS NFR BLD: 66.4 % (ref 38–73)
NRBC BLD-RTO: 0 /100 WBC
PLATELET # BLD AUTO: 207 K/UL (ref 150–450)
PMV BLD AUTO: 10 FL (ref 9.2–12.9)
POTASSIUM SERPL-SCNC: 3.9 MMOL/L (ref 3.5–5.1)
PROT SERPL-MCNC: 7.1 G/DL (ref 6–8.4)
RBC # BLD AUTO: 4.18 M/UL (ref 4–5.4)
SODIUM SERPL-SCNC: 137 MMOL/L (ref 136–145)
WBC # BLD AUTO: 6.63 K/UL (ref 3.9–12.7)

## 2024-01-30 PROCEDURE — 81206 BCR/ABL1 GENE MAJOR BP: CPT

## 2024-01-30 PROCEDURE — 1159F MED LIST DOCD IN RCRD: CPT | Mod: CPTII,S$GLB,, | Performed by: INTERNAL MEDICINE

## 2024-01-30 PROCEDURE — 3008F BODY MASS INDEX DOCD: CPT | Mod: CPTII,S$GLB,, | Performed by: INTERNAL MEDICINE

## 2024-01-30 PROCEDURE — 80053 COMPREHEN METABOLIC PANEL: CPT

## 2024-01-30 PROCEDURE — 3077F SYST BP >= 140 MM HG: CPT | Mod: CPTII,S$GLB,, | Performed by: INTERNAL MEDICINE

## 2024-01-30 PROCEDURE — 99999 PR PBB SHADOW E&M-EST. PATIENT-LVL III: CPT | Mod: PBBFAC,,, | Performed by: INTERNAL MEDICINE

## 2024-01-30 PROCEDURE — 99214 OFFICE O/P EST MOD 30 MIN: CPT | Mod: S$GLB,,, | Performed by: INTERNAL MEDICINE

## 2024-01-30 PROCEDURE — 85025 COMPLETE CBC W/AUTO DIFF WBC: CPT

## 2024-01-30 PROCEDURE — 3079F DIAST BP 80-89 MM HG: CPT | Mod: CPTII,S$GLB,, | Performed by: INTERNAL MEDICINE

## 2024-01-30 PROCEDURE — 1160F RVW MEDS BY RX/DR IN RCRD: CPT | Mod: CPTII,S$GLB,, | Performed by: INTERNAL MEDICINE

## 2024-01-30 RX ORDER — ONDANSETRON 4 MG/1
4 TABLET, FILM COATED ORAL EVERY 6 HOURS PRN
Qty: 30 TABLET | Refills: 11 | Status: SHIPPED | OUTPATIENT
Start: 2024-01-30

## 2024-01-30 RX ORDER — VALACYCLOVIR HYDROCHLORIDE 1 G/1
1000 TABLET, FILM COATED ORAL 2 TIMES DAILY
COMMUNITY
Start: 2023-11-14 | End: 2024-05-13 | Stop reason: SDUPTHER

## 2024-02-01 LAB
BCR/ABL,P210 RESULT: NORMAL
PATH REPORT.FINAL DX SPEC: NORMAL
SPECIMEN TYPE: NORMAL

## 2024-02-08 ENCOUNTER — PATIENT MESSAGE (OUTPATIENT)
Dept: HEMATOLOGY/ONCOLOGY | Facility: CLINIC | Age: 49
End: 2024-02-08
Payer: COMMERCIAL

## 2024-02-09 NOTE — PROGRESS NOTES
Section of Hematology and Stem Cell Transplantation  Follow Up Visit     Date of visit: 24  Visit diagnosis: CML (chronic myelocytic leukemia) [C92.10]  Referred by:  Dr. Brandt Oviedo    Oncologic History:     Primary Oncologic Diagnosis: Chronic myeloid leukemia, chronic phase    23: Routine labs notable for , Hgb 11.1, Plts 165.  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 Karyotype 46,XX,t(9;22)(q34;q11.2)[20]. BCR/ABL 27%. She had low risk disease by Sokal.   3/26/23: She started imatinib 400mg daily.   23: BCR/ABL at 3 months - 5.5%  23: Imatinib decreased to 300mg daily due to N/V and facial swelling.  23: BCR/ABL at 6 months - 0.4%  10/12/23: Imatinib decreased to 200mg daily due to persistent N/V and facial swelling.  23: BCR/ABL at 9 months - 1.3%  24: Recheck BCR/ABL - 1.0%    History of Present Ilness:   Audie Sandoval (Audei) is a pleasant 48 y.o.female who presents for follow up regarding CML. She is tolerating imatinib 200mg daily without significant side effects. She feels well overall.     PAST MEDICAL HISTORY:   Past Medical History:   Diagnosis Date    Allergy     Chronic myeloid leukemia 2023    Gestational hypertension        PAST SURGICAL HISTORY:   Past Surgical History:   Procedure Laterality Date     SECTION      TONSILLECTOMY         PAST SOCIAL HISTORY:  Social History     Tobacco Use    Smoking status: Never    Smokeless tobacco: Never   Substance Use Topics    Alcohol use: Yes     Comment: Rare    Drug use: Never       FAMILY HISTORY:  Family History   Problem Relation Age of Onset    Hypertension Mother     Arthritis Mother         DJD spine    Abnormal EKG Father     Diabetes Father        CURRENT MEDICATIONS:   Current Outpatient Medications   Medication Sig    acyclovir (ZOVIRAX) 400 MG tablet Take 400 mg by mouth 2 (two) times daily.    ALBUTEROL, REFILL, INHL     imatinib (GLEEVEC) 100 MG Tab Take 2 tablets  (200 mg total) by mouth once daily.    losartan (COZAAR) 25 MG tablet Take 0.5 tablets (12.5 mg total) by mouth once daily.    valACYclovir (VALTREX) 1000 MG tablet Take 1,000 mg by mouth 2 (two) times daily.    ondansetron (ZOFRAN) 4 MG tablet Take 1 tablet (4 mg total) by mouth every 6 (six) hours as needed for Nausea.     No current facility-administered medications for this visit.       ALLERGIES:   Review of patient's allergies indicates:   Allergen Reactions    Codeine     Wheat containing prod        Review of Systems:     Pertinent positives and negatives included in the HPI. Otherwise a complete review of systems is negative.    Physical Exam:     Vitals:    01/30/24 1331   BP: (!) 142/88   Pulse: 91   Temp: 98.3 °F (36.8 °C)     General: Appears well, NAD  Pulmonary: CTAB, no increased work of breathing, no W/R/C  Cardiovascular: S1S2 normal, RRR, no M/R/G  Abdominal: Soft, NT, ND, BS+, no HSM  Extremities: No C/C/E  Neurological: AAOx4, grossly normal, no focal deficits  Dermatologic: No appreciable rashes or lesions    ECOG Performance Status: (foot note - ECOG PS provided by Eastern Cooperative Oncology Group) 0 - Asymptomatic    Karnofsky Performance Score:  100%- Normal, No Complaints, No Evidence of Disease    Labs:   Lab Results   Component Value Date    WBC 6.63 01/30/2024    RBC 4.18 01/30/2024    HGB 13.0 01/30/2024    HCT 39.2 01/30/2024    MCV 94 01/30/2024    MCH 31.1 (H) 01/30/2024    MCHC 33.2 01/30/2024    RDW 13.2 01/30/2024     01/30/2024    MPV 10.0 01/30/2024    GRAN 4.4 01/30/2024    GRAN 66.4 01/30/2024    LYMPH 1.7 01/30/2024    LYMPH 25.9 01/30/2024    MONO 0.4 01/30/2024    MONO 5.7 01/30/2024    EOS 0.1 01/30/2024    BASO 0.01 01/30/2024    EOSINOPHIL 1.5 01/30/2024    BASOPHIL 0.2 01/30/2024       CMP  Sodium   Date Value Ref Range Status   01/30/2024 137 136 - 145 mmol/L Final     Potassium   Date Value Ref Range Status   01/30/2024 3.9 3.5 - 5.1 mmol/L Final     Chloride    Date Value Ref Range Status   01/30/2024 105 95 - 110 mmol/L Final     CO2   Date Value Ref Range Status   01/30/2024 27 23 - 29 mmol/L Final     Glucose   Date Value Ref Range Status   01/30/2024 90 70 - 110 mg/dL Final     BUN   Date Value Ref Range Status   01/30/2024 10 6 - 20 mg/dL Final     Creatinine   Date Value Ref Range Status   01/30/2024 0.8 0.5 - 1.4 mg/dL Final     Calcium   Date Value Ref Range Status   01/30/2024 9.7 8.7 - 10.5 mg/dL Final     Total Protein   Date Value Ref Range Status   01/30/2024 7.1 6.0 - 8.4 g/dL Final     Albumin   Date Value Ref Range Status   01/30/2024 4.1 3.5 - 5.2 g/dL Final     Total Bilirubin   Date Value Ref Range Status   01/30/2024 0.4 0.1 - 1.0 mg/dL Final     Comment:     For infants and newborns, interpretation of results should be based  on gestational age, weight and in agreement with clinical  observations.    Premature Infant recommended reference ranges:  Up to 24 hours.............<8.0 mg/dL  Up to 48 hours............<12.0 mg/dL  3-5 days..................<15.0 mg/dL  6-29 days.................<15.0 mg/dL       Alkaline Phosphatase   Date Value Ref Range Status   01/30/2024 64 55 - 135 U/L Final     AST   Date Value Ref Range Status   01/30/2024 17 10 - 40 U/L Final     ALT   Date Value Ref Range Status   01/30/2024 24 10 - 44 U/L Final     Anion Gap   Date Value Ref Range Status   01/30/2024 5 (L) 8 - 16 mmol/L Final     eGFR if    Date Value Ref Range Status   12/15/2020 >60.0 >60 mL/min/1.73 m^2 Final     eGFR if non    Date Value Ref Range Status   12/15/2020 >60.0 >60 mL/min/1.73 m^2 Final     Comment:     Calculation used to obtain the estimated glomerular filtration  rate (eGFR) is the CKD-EPI equation.          Imaging:   No results found for this or any previous visit (from the past 2160 hour(s)).    Pathology:  Reviewed     Assessment and Plan:   Audie Sandoval (Audie) is a pleasant 48 y.o.female with CP-CML  who presents for follow up.    Chronic myeloid leukemia, chronic phase:  She was diagnosed with chronic phase CML on 3/8/23. CG with t(9;22) without additional chromosomal abnormalities. She has low risk disease by Sokal. She started imatinib 400mg daily on 3/26/23, but she had N/V and facial swelling ultimately requiring dose reductions to 200mg daily (since 10/2023). Her BCR/ABL decreased appropriately by 6 months (9/2023) to 0.4%; however, more recently it increased to 1.3%. Repeat on 1/30/24 noted improvement to 1%. Will repeat in 6 weeks with follow up. If stable or increased will send resistance testing and change to another TKI.   - Repeat BCR/ABL in 6 weeks (mid-March).   - If >1% will send mutation testing and change TKI.     Chemotherapy-induced nausea/vomiting:  Zofran as needed.    Orders/Follow Up:      Orders Placed This Encounter    CBC Auto Differential    Comprehensive Metabolic Panel    Phosphorus    Magnesium    BCR/ABL, p210, Quant, Monitor, blood    ondansetron (ZOFRAN) 4 MG tablet       Route Chart for Scheduling    BMT Chart Routing      Follow up with physician 6 weeks. Mid-March   Follow up with MICHELL    Provider visit type    Infusion scheduling note    Injection scheduling note    Labs CMP, CBC, BCR/ABL, phosphorus and magnesium   Scheduling:  Preferred lab:  Lab interval:  Labs 1 week prior to visit   Imaging    Pharmacy appointment    Other referrals                       Total time of this visit was 35 minutes, including time spent face to face with patient and/or via video/audio, and also in preparing for today's visit for MDM and documentation. (Medical Decision Making, including consideration of possible diagnoses, management options, complex medical record review, review of diagnostic tests and information, consideration and discussion of significant complications based on comorbidities, and discussion with providers involved with the care of the patient). Greater than 50% was spent  face to face with the patient counseling and coordinating care.    Hamzah Conteh MD  Malignant Hematology, Stem Cell Transplant, and Cellular Therapy  [facility]  [facility]

## 2024-02-21 DIAGNOSIS — Z12.31 OTHER SCREENING MAMMOGRAM: ICD-10-CM

## 2024-02-27 ENCOUNTER — PATIENT MESSAGE (OUTPATIENT)
Dept: HEMATOLOGY/ONCOLOGY | Facility: CLINIC | Age: 49
End: 2024-02-27
Payer: COMMERCIAL

## 2024-03-05 ENCOUNTER — PATIENT MESSAGE (OUTPATIENT)
Dept: HEMATOLOGY/ONCOLOGY | Facility: CLINIC | Age: 49
End: 2024-03-05
Payer: COMMERCIAL

## 2024-03-05 ENCOUNTER — PATIENT MESSAGE (OUTPATIENT)
Dept: INTERNAL MEDICINE | Facility: CLINIC | Age: 49
End: 2024-03-05
Payer: COMMERCIAL

## 2024-03-05 DIAGNOSIS — C92.10 CML (CHRONIC MYELOCYTIC LEUKEMIA): Primary | ICD-10-CM

## 2024-03-05 DIAGNOSIS — D53.9 MACROCYTIC ANEMIA: ICD-10-CM

## 2024-03-05 NOTE — TELEPHONE ENCOUNTER
I spoke to the patient and informed her that she will need to schedule an appointment with her PCP.  The patient agreed and will call to schedule.

## 2024-03-06 ENCOUNTER — LAB VISIT (OUTPATIENT)
Dept: LAB | Facility: HOSPITAL | Age: 49
End: 2024-03-06
Attending: INTERNAL MEDICINE
Payer: COMMERCIAL

## 2024-03-06 DIAGNOSIS — C92.10 CML (CHRONIC MYELOCYTIC LEUKEMIA): ICD-10-CM

## 2024-03-06 DIAGNOSIS — D53.9 MACROCYTIC ANEMIA: ICD-10-CM

## 2024-03-06 LAB
ALBUMIN SERPL BCP-MCNC: 3.9 G/DL (ref 3.5–5.2)
ALP SERPL-CCNC: 56 U/L (ref 55–135)
ALT SERPL W/O P-5'-P-CCNC: 27 U/L (ref 10–44)
ANION GAP SERPL CALC-SCNC: 7 MMOL/L (ref 8–16)
AST SERPL-CCNC: 24 U/L (ref 10–40)
BASOPHILS # BLD AUTO: 0.01 K/UL (ref 0–0.2)
BASOPHILS NFR BLD: 0.2 % (ref 0–1.9)
BILIRUB SERPL-MCNC: 0.3 MG/DL (ref 0.1–1)
BUN SERPL-MCNC: 14 MG/DL (ref 6–20)
CALCIUM SERPL-MCNC: 9.4 MG/DL (ref 8.7–10.5)
CHLORIDE SERPL-SCNC: 107 MMOL/L (ref 95–110)
CO2 SERPL-SCNC: 23 MMOL/L (ref 23–29)
CREAT SERPL-MCNC: 0.8 MG/DL (ref 0.5–1.4)
DIFFERENTIAL METHOD BLD: ABNORMAL
EOSINOPHIL # BLD AUTO: 0.1 K/UL (ref 0–0.5)
EOSINOPHIL NFR BLD: 1.6 % (ref 0–8)
ERYTHROCYTE [DISTWIDTH] IN BLOOD BY AUTOMATED COUNT: 13.2 % (ref 11.5–14.5)
EST. GFR  (NO RACE VARIABLE): >60 ML/MIN/1.73 M^2
FERRITIN SERPL-MCNC: 28 NG/ML (ref 20–300)
GLUCOSE SERPL-MCNC: 87 MG/DL (ref 70–110)
HCT VFR BLD AUTO: 38.4 % (ref 37–48.5)
HGB BLD-MCNC: 12.7 G/DL (ref 12–16)
IMM GRANULOCYTES # BLD AUTO: 0.03 K/UL (ref 0–0.04)
IMM GRANULOCYTES NFR BLD AUTO: 0.5 % (ref 0–0.5)
IRON SERPL-MCNC: 59 UG/DL (ref 30–160)
LYMPHOCYTES # BLD AUTO: 1.5 K/UL (ref 1–4.8)
LYMPHOCYTES NFR BLD: 27.4 % (ref 18–48)
MAGNESIUM SERPL-MCNC: 2 MG/DL (ref 1.6–2.6)
MCH RBC QN AUTO: 31.8 PG (ref 27–31)
MCHC RBC AUTO-ENTMCNC: 33.1 G/DL (ref 32–36)
MCV RBC AUTO: 96 FL (ref 82–98)
MONOCYTES # BLD AUTO: 0.3 K/UL (ref 0.3–1)
MONOCYTES NFR BLD: 4.8 % (ref 4–15)
NEUTROPHILS # BLD AUTO: 3.6 K/UL (ref 1.8–7.7)
NEUTROPHILS NFR BLD: 65.5 % (ref 38–73)
NRBC BLD-RTO: 0 /100 WBC
PHOSPHATE SERPL-MCNC: 3.1 MG/DL (ref 2.7–4.5)
PLATELET # BLD AUTO: 214 K/UL (ref 150–450)
PMV BLD AUTO: 11.1 FL (ref 9.2–12.9)
POTASSIUM SERPL-SCNC: 4.4 MMOL/L (ref 3.5–5.1)
PROT SERPL-MCNC: 6.6 G/DL (ref 6–8.4)
RBC # BLD AUTO: 3.99 M/UL (ref 4–5.4)
SATURATED IRON: 16 % (ref 20–50)
SODIUM SERPL-SCNC: 137 MMOL/L (ref 136–145)
TOTAL IRON BINDING CAPACITY: 364 UG/DL (ref 250–450)
TRANSFERRIN SERPL-MCNC: 246 MG/DL (ref 200–375)
WBC # BLD AUTO: 5.47 K/UL (ref 3.9–12.7)

## 2024-03-06 PROCEDURE — 81206 BCR/ABL1 GENE MAJOR BP: CPT | Performed by: INTERNAL MEDICINE

## 2024-03-06 PROCEDURE — 80053 COMPREHEN METABOLIC PANEL: CPT | Performed by: INTERNAL MEDICINE

## 2024-03-06 PROCEDURE — 82728 ASSAY OF FERRITIN: CPT | Performed by: INTERNAL MEDICINE

## 2024-03-06 PROCEDURE — 85025 COMPLETE CBC W/AUTO DIFF WBC: CPT | Performed by: INTERNAL MEDICINE

## 2024-03-06 PROCEDURE — 83735 ASSAY OF MAGNESIUM: CPT | Performed by: INTERNAL MEDICINE

## 2024-03-06 PROCEDURE — 83540 ASSAY OF IRON: CPT | Performed by: INTERNAL MEDICINE

## 2024-03-06 PROCEDURE — 84100 ASSAY OF PHOSPHORUS: CPT | Performed by: INTERNAL MEDICINE

## 2024-03-12 LAB
BCR/ABL,P210 RESULT: NORMAL
PATH REPORT.FINAL DX SPEC: NORMAL
SPECIMEN TYPE: NORMAL

## 2024-03-13 ENCOUNTER — TELEPHONE (OUTPATIENT)
Dept: HEMATOLOGY/ONCOLOGY | Facility: CLINIC | Age: 49
End: 2024-03-13
Payer: COMMERCIAL

## 2024-03-13 ENCOUNTER — OFFICE VISIT (OUTPATIENT)
Dept: HEMATOLOGY/ONCOLOGY | Facility: CLINIC | Age: 49
End: 2024-03-13
Payer: COMMERCIAL

## 2024-03-13 VITALS
WEIGHT: 182.88 LBS | HEART RATE: 76 BPM | HEIGHT: 60 IN | TEMPERATURE: 98 F | SYSTOLIC BLOOD PRESSURE: 139 MMHG | OXYGEN SATURATION: 99 % | DIASTOLIC BLOOD PRESSURE: 81 MMHG | BODY MASS INDEX: 35.9 KG/M2

## 2024-03-13 DIAGNOSIS — F41.1 ANXIETY ASSOCIATED WITH CANCER DIAGNOSIS: ICD-10-CM

## 2024-03-13 DIAGNOSIS — E86.0 DEHYDRATION: ICD-10-CM

## 2024-03-13 DIAGNOSIS — D50.9 IRON DEFICIENCY ANEMIA, UNSPECIFIED IRON DEFICIENCY ANEMIA TYPE: ICD-10-CM

## 2024-03-13 DIAGNOSIS — C92.10 CML (CHRONIC MYELOCYTIC LEUKEMIA): Primary | ICD-10-CM

## 2024-03-13 DIAGNOSIS — C80.1 ANXIETY ASSOCIATED WITH CANCER DIAGNOSIS: ICD-10-CM

## 2024-03-13 PROCEDURE — 99999 PR PBB SHADOW E&M-EST. PATIENT-LVL IV: CPT | Mod: PBBFAC,,, | Performed by: INTERNAL MEDICINE

## 2024-03-13 PROCEDURE — 3075F SYST BP GE 130 - 139MM HG: CPT | Mod: CPTII,S$GLB,, | Performed by: INTERNAL MEDICINE

## 2024-03-13 PROCEDURE — 1160F RVW MEDS BY RX/DR IN RCRD: CPT | Mod: CPTII,S$GLB,, | Performed by: INTERNAL MEDICINE

## 2024-03-13 PROCEDURE — 1159F MED LIST DOCD IN RCRD: CPT | Mod: CPTII,S$GLB,, | Performed by: INTERNAL MEDICINE

## 2024-03-13 PROCEDURE — 99215 OFFICE O/P EST HI 40 MIN: CPT | Mod: S$GLB,,, | Performed by: INTERNAL MEDICINE

## 2024-03-13 PROCEDURE — 3008F BODY MASS INDEX DOCD: CPT | Mod: CPTII,S$GLB,, | Performed by: INTERNAL MEDICINE

## 2024-03-13 PROCEDURE — 3079F DIAST BP 80-89 MM HG: CPT | Mod: CPTII,S$GLB,, | Performed by: INTERNAL MEDICINE

## 2024-03-13 NOTE — TELEPHONE ENCOUNTER
Spoke w/ pt in regards to scheduling integrative consultation per Dr. Conteh referral. Pt approved to schedule in-person visit with Joanna Dubinsky, PA-C for tomorrow 3/14/2024 @ 8AM. MA advised pt. That this is a consultation visit whereby the provider will review pt's overall health and all services offered by [facility]. MA informed pt that clinic is located on the 3rd floor of the [facility]. Pt acknowledged.       [place], [place] Ext [phone]

## 2024-03-13 NOTE — PROGRESS NOTES
Blood drawn and sent to lab   Section of Hematology and Stem Cell Transplantation  Follow Up Visit     Date of visit: 3/13/24  Visit diagnosis: CML (chronic myelocytic leukemia) [C92.10]    Oncologic History:     Primary Oncologic Diagnosis: Chronic myeloid leukemia, chronic phase    23: Routine labs notable for , Hgb 11.1, Plts 165.  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 Karyotype 46,XX,t(9;22)(q34;q11.2)[20]. BCR/ABL 27%. She had low risk disease by Sokal.   3/26/23: She started imatinib 400mg daily.   23: BCR/ABL at 3 months - 5.5%  23: Imatinib decreased to 300mg daily due to N/V and facial swelling.  23: BCR/ABL at 6 months - 0.4%  10/12/23: Imatinib decreased to 200mg daily due to persistent N/V and facial swelling.  23: BCR/ABL at 9 months - 1.3%  24: Recheck BCR/ABL - 1.0%  3/6/24: BCR/ABL 0.7%    History of Present Ilness:   Audie Sandoval (Audie) is a pleasant 48 y.o.female who presents for follow up regarding CML. She had a recent URI with fevers around [place]. She feels well now. She has had more emotional distress. She also had recent dehydration with associated muscle cramps.     PAST MEDICAL HISTORY:   Past Medical History:   Diagnosis Date    Allergy     Chronic myeloid leukemia 2023    Gestational hypertension        PAST SURGICAL HISTORY:   Past Surgical History:   Procedure Laterality Date     SECTION      TONSILLECTOMY         PAST SOCIAL HISTORY:  Social History     Tobacco Use    Smoking status: Never    Smokeless tobacco: Never   Substance Use Topics    Alcohol use: Yes     Comment: Rare    Drug use: Never       FAMILY HISTORY:  Family History   Problem Relation Age of Onset    Hypertension Mother     Arthritis Mother         DJD spine    Abnormal EKG Father     Diabetes Father        CURRENT MEDICATIONS:   Current Outpatient Medications   Medication Sig    acyclovir (ZOVIRAX) 400 MG tablet Take 400 mg by mouth 2 (two) times daily.    ALBUTEROL,  REFILL, INHL     imatinib (GLEEVEC) 100 MG Tab Take 2 tablets (200 mg total) by mouth once daily.    losartan (COZAAR) 25 MG tablet Take 0.5 tablets (12.5 mg total) by mouth once daily.    ondansetron (ZOFRAN) 4 MG tablet Take 1 tablet (4 mg total) by mouth every 6 (six) hours as needed for Nausea.    valACYclovir (VALTREX) 1000 MG tablet Take 1,000 mg by mouth 2 (two) times daily.     No current facility-administered medications for this visit.       ALLERGIES:   Review of patient's allergies indicates:   Allergen Reactions    Codeine     Wheat containing prod        Review of Systems:     Pertinent positives and negatives included in the HPI. Otherwise a complete review of systems is negative.    Physical Exam:     Vitals:    03/13/24 0946   BP: 139/81   Pulse: 76   Temp: 97.6 °F (36.4 °C)     General: Appears well, NAD  Pulmonary: CTAB, no increased work of breathing, no W/R/C  Cardiovascular: S1S2 normal, RRR, no M/R/G  Abdominal: Soft, NT, ND, BS+, no HSM  Extremities: No C/C/E  Neurological: AAOx4, grossly normal, no focal deficits  Dermatologic: No appreciable rashes or lesions    ECOG Performance Status: (foot note - ECOG PS provided by Eastern Cooperative Oncology Group) 0 - Asymptomatic    Karnofsky Performance Score:  100%- Normal, No Complaints, No Evidence of Disease    Labs:   Lab Results   Component Value Date    WBC 5.47 03/06/2024    RBC 3.99 (L) 03/06/2024    HGB 12.7 03/06/2024    HCT 38.4 03/06/2024    MCV 96 03/06/2024    MCH 31.8 (H) 03/06/2024    MCHC 33.1 03/06/2024    RDW 13.2 03/06/2024     03/06/2024    MPV 11.1 03/06/2024    GRAN 3.6 03/06/2024    GRAN 65.5 03/06/2024    LYMPH 1.5 03/06/2024    LYMPH 27.4 03/06/2024    MONO 0.3 03/06/2024    MONO 4.8 03/06/2024    EOS 0.1 03/06/2024    BASO 0.01 03/06/2024    EOSINOPHIL 1.6 03/06/2024    BASOPHIL 0.2 03/06/2024       CMP  Sodium   Date Value Ref Range Status   03/06/2024 137 136 - 145 mmol/L Final     Potassium   Date Value Ref  Range Status   03/06/2024 4.4 3.5 - 5.1 mmol/L Final     Chloride   Date Value Ref Range Status   03/06/2024 107 95 - 110 mmol/L Final     CO2   Date Value Ref Range Status   03/06/2024 23 23 - 29 mmol/L Final     Glucose   Date Value Ref Range Status   03/06/2024 87 70 - 110 mg/dL Final     BUN   Date Value Ref Range Status   03/06/2024 14 6 - 20 mg/dL Final     Creatinine   Date Value Ref Range Status   03/06/2024 0.8 0.5 - 1.4 mg/dL Final     Calcium   Date Value Ref Range Status   03/06/2024 9.4 8.7 - 10.5 mg/dL Final     Total Protein   Date Value Ref Range Status   03/06/2024 6.6 6.0 - 8.4 g/dL Final     Albumin   Date Value Ref Range Status   03/06/2024 3.9 3.5 - 5.2 g/dL Final     Total Bilirubin   Date Value Ref Range Status   03/06/2024 0.3 0.1 - 1.0 mg/dL Final     Comment:     For infants and newborns, interpretation of results should be based  on gestational age, weight and in agreement with clinical  observations.    Premature Infant recommended reference ranges:  Up to 24 hours.............<8.0 mg/dL  Up to 48 hours............<12.0 mg/dL  3-5 days..................<15.0 mg/dL  6-29 days.................<15.0 mg/dL       Alkaline Phosphatase   Date Value Ref Range Status   03/06/2024 56 55 - 135 U/L Final     AST   Date Value Ref Range Status   03/06/2024 24 10 - 40 U/L Final     ALT   Date Value Ref Range Status   03/06/2024 27 10 - 44 U/L Final     Anion Gap   Date Value Ref Range Status   03/06/2024 7 (L) 8 - 16 mmol/L Final     eGFR if    Date Value Ref Range Status   12/15/2020 >60.0 >60 mL/min/1.73 m^2 Final     eGFR if non    Date Value Ref Range Status   12/15/2020 >60.0 >60 mL/min/1.73 m^2 Final     Comment:     Calculation used to obtain the estimated glomerular filtration  rate (eGFR) is the CKD-EPI equation.          Imaging:   No results found for this or any previous visit (from the past 2160 hour(s)).    Pathology:  Reviewed     Assessment and Plan:    Audie Sandoval (Audie) is a pleasant 48 y.o.female with CP-CML who presents for follow up.    Chronic myeloid leukemia, chronic phase:  She was diagnosed with chronic phase CML on 3/8/23. CG with t(9;22) without additional chromosomal abnormalities. She has low risk disease by Sokal. She started imatinib 400mg daily on 3/26/23, but she had N/V and facial swelling ultimately requiring dose reductions to 200mg daily (since 10/2023). Her BCR/ABL decreased appropriately by 6 months (9/2023) to 0.4%; however, more recently it increased to 1.3%. Repeat on 1/30/24 noted improvement to 1%. Repeat on 3/6/24 revealed an improvement to 0.7%. Will continue imatinib 200mg daily for now as she continues to have a response.   - Continue imatinib 200mg daily.  - Repeat BCR/ABL in 3 months.   - Referred to [facility].     Chemotherapy-induced nausea/vomiting:  Zofran as needed.    Dehydration:  Fluids PRN. Treatment plan in place.    Orders/Follow Up:      Orders Placed This Encounter    Ambulatory referral/consult to [facility]       Route Chart for Scheduling    BMT Chart Routing      Follow up with physician 3 months.   Follow up with MICHELL    Provider visit type    Infusion scheduling note    Injection scheduling note    Labs CBC, CMP, BCR/ABL, phosphorus and magnesium   Scheduling:  Preferred lab:  Lab interval:  2 days prior to visit   Imaging    Pharmacy appointment    Other referrals                       Total time of this visit was 40 minutes, including time spent face to face with patient and/or via video/audio, and also in preparing for today's visit for MDM and documentation. (Medical Decision Making, including consideration of possible diagnoses, management options, complex medical record review, review of diagnostic tests and information, consideration and discussion of significant complications based on comorbidities, and discussion with providers involved with the care of the patient).  Greater than 50% was spent face to face with the patient counseling and coordinating care.    Hamzah Conteh MD  Malignant Hematology, Stem Cell Transplant, and Cellular Therapy  [facility]  [facility]

## 2024-03-14 ENCOUNTER — PATIENT MESSAGE (OUTPATIENT)
Dept: HEMATOLOGY/ONCOLOGY | Facility: CLINIC | Age: 49
End: 2024-03-14

## 2024-03-14 ENCOUNTER — OFFICE VISIT (OUTPATIENT)
Dept: HEMATOLOGY/ONCOLOGY | Facility: CLINIC | Age: 49
End: 2024-03-14
Payer: COMMERCIAL

## 2024-03-14 VITALS
HEIGHT: 60 IN | WEIGHT: 183.88 LBS | DIASTOLIC BLOOD PRESSURE: 85 MMHG | BODY MASS INDEX: 36.1 KG/M2 | HEART RATE: 82 BPM | OXYGEN SATURATION: 98 % | SYSTOLIC BLOOD PRESSURE: 126 MMHG

## 2024-03-14 DIAGNOSIS — G89.29 CHRONIC BILATERAL LOW BACK PAIN WITHOUT SCIATICA: ICD-10-CM

## 2024-03-14 DIAGNOSIS — F41.1 ANXIETY ASSOCIATED WITH CANCER DIAGNOSIS: ICD-10-CM

## 2024-03-14 DIAGNOSIS — M54.50 CHRONIC BILATERAL LOW BACK PAIN WITHOUT SCIATICA: ICD-10-CM

## 2024-03-14 DIAGNOSIS — C80.1 ANXIETY ASSOCIATED WITH CANCER DIAGNOSIS: ICD-10-CM

## 2024-03-14 DIAGNOSIS — C92.10 CML (CHRONIC MYELOCYTIC LEUKEMIA): Primary | ICD-10-CM

## 2024-03-14 PROCEDURE — 3079F DIAST BP 80-89 MM HG: CPT | Mod: CPTII,S$GLB,, | Performed by: PHYSICIAN ASSISTANT

## 2024-03-14 PROCEDURE — 99999 PR PBB SHADOW E&M-EST. PATIENT-LVL IV: CPT | Mod: PBBFAC,,, | Performed by: PHYSICIAN ASSISTANT

## 2024-03-14 PROCEDURE — 99215 OFFICE O/P EST HI 40 MIN: CPT | Mod: S$GLB,,, | Performed by: PHYSICIAN ASSISTANT

## 2024-03-14 PROCEDURE — 3074F SYST BP LT 130 MM HG: CPT | Mod: CPTII,S$GLB,, | Performed by: PHYSICIAN ASSISTANT

## 2024-03-14 PROCEDURE — 1159F MED LIST DOCD IN RCRD: CPT | Mod: CPTII,S$GLB,, | Performed by: PHYSICIAN ASSISTANT

## 2024-03-14 PROCEDURE — 3008F BODY MASS INDEX DOCD: CPT | Mod: CPTII,S$GLB,, | Performed by: PHYSICIAN ASSISTANT

## 2024-03-14 NOTE — PROGRESS NOTES
"Integrative Health and Medicine Initial Visit      Chief Complaint:  I want to promote my overall well-being through cancer.    HPI: Patient is 47 y/o FEMALE diagnosed with CML about one year ago she is referred to Integrative Oncology by Dr. Conteh.  Patient currently taking a cancer growth inhibitor, Imatinib and has been doing well on it from a treatment standpoint.   Patient states she has been really struggling lately to manage her life and care for herself and her family.  She is exhausted all the time.  She does get sleep and but could sleep during the day if she lets herself "If I have an extra few hours for myself I just want to sleep".  But occasionally she can't sleep and feels wide awake when she should be sleeping.  She often feels groggy in the morning.  She has muscle aches especially in her legs.  Taking magnesium helps.  Patient has not been working throughout her treatment and would like to get back to work, but as it is, she is managing her household, including her children and her , who is very supportive but she has to tell him what to do.  She also is responsible for her 84 y/o grandma, who is mostly independent, but she manages many things for her, including someone coming into the home a few days a week.  Patient states "I know I put myself last and take care of everyone else and for years I just muscled through it, but I feel like I cannot do it anymore."  She states her mother has mental health issues and is now on medication that make her "practically comatose."  Her mother is taken care of by her stepfather "I'm very grateful to him".  Watching her mother on psychiatric medications has made her never want to take those kind of medications, even though she knows she has a down mood.  Patient has been trying many things, she does try to walk for exercise but her legs swelled up and she has hip pain and feet pain.  She tries to eat well; they eat home-cooked food.  She went " "gluten-free for Lent because she does thinks this helps her inflammation.  She is very open to meditation, yoga, and other modalities to help her.    Patient does think she is impacted from the trauma of her childhood (her mother had her at age 18).  She also has not told anyone about her diagnosis except her  and a couple of close friends.  She wants to protect her children from knowing about the diagnosis and worrying about her.   Additionally, patient feels that her hormones are "off".  My cycles were like clockwork but not the last two months; this may be due to additional stress, she states.  "But I just feel like I'm up and down in a hormonal way and crying all the time and that's not me."     Cancer/Stage/TNM:   Cancer Staging   No matching staging information was found for the patient.     Oncology History    No history exists.         Past Medical History:   Diagnosis Date    Allergy     Chronic myeloid leukemia 2023    Gestational hypertension         Current Outpatient Medications   Medication Instructions    acyclovir (ZOVIRAX) 400 mg, Oral, 2 times daily    ALBUTEROL, REFILL, INHL No dose, route, or frequency recorded.    imatinib (GLEEVEC) 200 mg, Oral, Daily    losartan (COZAAR) 12.5 mg, Oral, Daily    ondansetron (ZOFRAN) 4 mg, Oral, Every 6 hours PRN    valACYclovir (VALTREX) 1,000 mg, Oral, 2 times daily        Past Surgical History:   Procedure Laterality Date     SECTION      TONSILLECTOMY            Distress Score:            7 Pillars Assessment      Sleep  How many hours of sleep per night? 8+ hours  Do you have trouble falling asleep, staying asleep or waking up earlier than you need to? yes  Do you have daytime fatigue? yes  Do you need medication for sleep? no  Do you use any supplements or other interventions for sleep? no    Resilience  Rate your current level of stress- high  How do you manage stress?  Just compartmentalize    Purpose  Do you feel you have a vision " or a life purpose? Yes    Environment  Any exposures:no known exposures    Spirituality-  Amish    Nutrition   Food allergies or sensitivities: yes  Do you adhere to a particular type of diet? yes  What type of diet do you follow? Currently Gluten free  Do you have any concerns with your eating habits? no  Are you concerned with your level of alcohol intake? no    Exercise  How would you describe your physical activity level? Low because of fatigue  Do you work at a sedentary job? no  What do you do for physical activity? walk      Physical Exam   LMP 02/28/2024    Wt Readings from Last 3 Encounters:   03/13/24 83 kg (182 lb 14 oz)   01/30/24 84.8 kg (186 lb 15.2 oz)   12/19/23 81.8 kg (180 lb 5.4 oz)     Temp Readings from Last 3 Encounters:   03/13/24 97.6 °F (36.4 °C) (Oral)   01/30/24 98.3 °F (36.8 °C)   12/19/23 98.2 °F (36.8 °C) (Temporal)     BP Readings from Last 3 Encounters:   03/13/24 139/81   01/30/24 (!) 142/88   12/19/23 (!) 148/94     Pulse Readings from Last 3 Encounters:   03/13/24 76   01/30/24 91   12/19/23 82       Body mass index is There is no height or weight on file to calculate BMI.    Vitals reviewed.   Constitutional:       General: Patient is not in acute distress.     Appearance: Normal appearance.   HENT:      Head: Normocephalic and atraumatic.  Pulmonary:      Effort: Pulmonary effort is normal.       Review of Systems:   Cardiac:           No SOB, chest pain with exertion,edema, orthopnea  Distress:         sadness, no hopelessness, some anhedonia, anxiety  Cognitive:        brain gog  Fatigue:           Energy level adequate, performing ADL's, no morning fatigue                           Fatigue  1  / 10  ( Scale 0 - 10)   Hormonal:       No hot flashes, no night sweats  Pain:                Has pain,  location: legs                           Pain 2   / 10 (Scale 0 - 10)    Neuropathy:    No numbness, no tingling, no paresthesia   Sleep:              difficulty falling  asleep,waking up in night, daytime sleepiness, no snoring  Altered function:          No problems with money management,  no problems with daily organization & planning  Weight:           wants to lose a little and maintain healthy        Labs:   Lab Results   Component Value Date    WBC 5.47 03/06/2024    HGB 12.7 03/06/2024    HCT 38.4 03/06/2024    MCV 96 03/06/2024     03/06/2024           Hemoglobin A1C   Date Value Ref Range Status   12/15/2020 5.3 4.0 - 5.6 % Final     Comment:     ADA Screening Guidelines:  5.7-6.4%  Consistent with prediabetes  >or=6.5%  Consistent with diabetes  High levels of fetal hemoglobin interfere with the HbA1C  assay. Heterozygous hemoglobin variants (HbS, HgC, etc)do  not significantly interfere with this assay.   However, presence of multiple variants may affect accuracy.              Assessment:   Patient is a 48 y.o.female who arrived to Integrative Oncology for consult during cancer treatment for CML.       Plan   #Nutrition: Encourage plant-forward anti-inflammatory diet with plenty of protein, will refer to Nutrition   # Sleep: Recommend 6-8 hours of restful sleep nightly; recommend strong sleep hygiene routine one hour prior to bed (sending meditation and guided imagery)  # Exercise: Recommend 60 minutes of gentle movement/light activity per day (cleaning, walking, cooking, gardening, stationary bike) at baseline and, if can tolerate, build up to at least 150 minutes (2 ½ hours) of moderate-intensity exercise as can tolerate with fatigue.   # Acupuncture--pain, anxiety, sleep   # OT/yoga--pain, anxiety, sleep (patient aware there will be a waiting list for this)  # Psychology for anxiety, depression, situational adjustment disorder, prior trauma  # Dr. Early for hormonal/supriya-menopause symptoms  # Patient interested in classes including Meditation  # Sent Guided Imagery and meditation sources to patient   #Follow-Up: PRN    Face to Face Time With Patient: 50 min    I spent a total of 70 minutes on the day of the visit.This includes face to face time and non-face to face time preparing to see the patient (eg, review of tests), obtaining and/or reviewing separately obtained history, documenting clinical information in the electronic or other health record, independently interpreting results and communicating results to the patient/family/caregiver, or care coordinator.

## 2024-03-18 ENCOUNTER — TELEPHONE (OUTPATIENT)
Dept: HEMATOLOGY/ONCOLOGY | Facility: CLINIC | Age: 49
End: 2024-03-18
Payer: COMMERCIAL

## 2024-03-18 ENCOUNTER — TELEPHONE (OUTPATIENT)
Dept: OBSTETRICS AND GYNECOLOGY | Facility: CLINIC | Age: 49
End: 2024-03-18
Payer: COMMERCIAL

## 2024-03-18 NOTE — NURSING
RN Navigator left a voicemail for patient in regards to scheduling an appt with Dr. Early and the [facility]. RN provided contact information requesting return call, CYDNEY Manzo

## 2024-03-18 NOTE — TELEPHONE ENCOUNTER
Spoke w/ pt in regards to scheduling can psych referral placed by Joanna Dubinsky, PA-C.  MA offer to schedule w/ Dr. Vazquez Card. Pt denied. Pt requested to be seen by female provider that specialize with CML patient. MA sent message to Yulissa to reach out to patient. Pt also requested to be scheduled for 1:1 yoga with Tamarin. MA will send message to Laura to reach out to patient for scheduling.       ELAYNE VAZQUEZ ext [phone]

## 2024-03-20 ENCOUNTER — PATIENT MESSAGE (OUTPATIENT)
Dept: HEMATOLOGY/ONCOLOGY | Facility: CLINIC | Age: 49
End: 2024-03-20
Payer: COMMERCIAL

## 2024-03-21 ENCOUNTER — TELEPHONE (OUTPATIENT)
Dept: PSYCHIATRY | Facility: CLINIC | Age: 49
End: 2024-03-21
Payer: COMMERCIAL

## 2024-04-03 NOTE — PROGRESS NOTES
Oncology Nutrition Assessment Medical Nutrition Therapy Initial Visit    Audie Sandoval  1975    Referring Provider: Dubinsky, Joanna L., PA*   Plan Provider: Dr. Conteh  Reason for Referral: not provided    Diagnosis: CML  Treatment: cancer growth inhibitor, Imatinib     PMHx:   Past Medical History:   Diagnosis Date    Allergy     Chronic myeloid leukemia 03/08/2023    Gestational hypertension      Allergies: Codeine and Wheat containing prod    Nutrition Assessment    Anthropometrics:   Weight:   04/04/24: 181.6 lb (in RD office)  Wt Readings from Last 10 Encounters:   03/14/24 83.4 kg (183 lb 13.8 oz)   03/13/24 83 kg (182 lb 14 oz)   01/30/24 84.8 kg (186 lb 15.2 oz)   12/19/23 81.8 kg (180 lb 5.4 oz)   12/12/23 81.2 kg (179 lb)   10/12/23 81.5 kg (179 lb 12.6 oz)   06/29/23 76.7 kg (169 lb 1.5 oz)   04/20/23 72.8 kg (160 lb 9.7 oz)   03/16/23 71.7 kg (158 lb 2.9 oz)   03/02/23 71 kg (156 lb 8.4 oz)                              Height: 60 inches   BMI: 35.9  BSA: 1.87 Usual BW: 180 lbs. Timeframe: since having 2nd child  Weight Change: history of 30 lb weight loss with Ozempic which was stopped ~1 year ago.     Food/Nutrition-related History:  Regular Diet. Reported allergy to gluten. Snacking in the afternoon when kids are home and snacking. Eating fast foods once a week due to after school activities. Primarily cooking 4 days in the week. Grilling/Roasting proteins at home. Minimal intake of vegetables and fruit.    Appetite: Good  Diet Recall  Breakfast: 2 eggs with sausage or thornton and 1/2 avocado  Lunch: protein with rice or salad/GF crackers with protein  Dinner: fast food (btw children activities) or home cooked meals (protein, carb, vegetables)   Snacks: mixed nuts (aware of portions) and cheese/chips/Easter candy  Drinks: 2 cups of coffee, Zoa energy drink at 5-6 pm twice a week, 80 oz of water daily. Occasional intake of liquid IV.  ONS: ancient nutrition multi collagen (in coffee)    HPI:  "  Audie Sandoval is a 48 y.o. female with CML referred by Joanna Dubinsky PA. Patient currently taking a cancer growth inhibitor, Imatinib and has been doing well on it from a treatment standpoint. Patient states she has been really struggling lately to manage her life and care for herself and her family. She is exhausted all the time. She does get sleep and but could sleep during the day if she lets herself "If I have an extra few hours for myself I just want to sleep". But occasionally she can't sleep and feels wide awake when she should be sleeping. Taking Melatonin 1 mg before bed. She often feels groggy in the morning. She has muscle aches especially in her legs. Taking Bioemblem Triple Mg Complex helps. Patient has not been working throughout her treatment and would like to get back to work, but as it is, she is managing her household, including her children and her , who is very supportive but she has to tell him what to do. She also is responsible for her 84 y/o grandma, who is mostly independent, but she manages many things for her, including someone coming into the home a few days a week. Patient has recently started walking for 20 minutes a day for exercise but her legs swelled up and she has hip pain and feet pain. She tries to eat well; they eat home-cooked food 4x times a week as well as fast food when on the road with kids activities. She went gluten-free for Lent because she does thinks this helps her inflammation and reported history of a known gluten allergy. She is very open to meditation, yoga, and other modalities to help her. Additionally, patient feels that her hormones are "off". My cycles were like clockwork but not the last two months; this may be due to additional stress, she states. "But I just feel like I'm up and down in a hormonal way and crying all the time and that's not me."     Encounter Notes:  Today, 04/04/24, Ms. Sandoval expressed interest in learning about ways to manage " hormones, inflammation, and fatigue with nutrition/diet. Based on food recall, minimal dairy intake and trialed gluten-free diet during lent. Noted that patient is interested in vitamin/mineral supplements but unsure of what is safe to take while on Gleevec.     Current Medications:    Current Outpatient Medications:     acyclovir (ZOVIRAX) 400 MG tablet, Take 400 mg by mouth 2 (two) times daily., Disp: , Rfl:     ALBUTEROL, REFILL, INHL, , Disp: , Rfl:     imatinib (GLEEVEC) 100 MG Tab, Take 2 tablets (200 mg total) by mouth once daily., Disp: 60 tablet, Rfl: 3    losartan (COZAAR) 25 MG tablet, Take 0.5 tablets (12.5 mg total) by mouth once daily., Disp: 30 tablet, Rfl: 11    ondansetron (ZOFRAN) 4 MG tablet, Take 1 tablet (4 mg total) by mouth every 6 (six) hours as needed for Nausea., Disp: 30 tablet, Rfl: 11    valACYclovir (VALTREX) 1000 MG tablet, Take 1,000 mg by mouth 2 (two) times daily., Disp: , Rfl:   1 mg of Melatonin  Bioemblem Triple Magnesium Complex    Labs: Reviewed from 3/6/24     Nutrition Diagnosis    Nutrition Problem: nutrition related knowledge deficit    Etiology (related to): lack of prior need for nutrition education   Signs/Symptoms (as evidenced by): self reported diet questions/concerns  and request for dietitian support in improving inflammation      Nutrition Intervention    Nutrition Prescription  6481-1226 kcals/day 25-30 kcal/kg    g protein/day 1-1.2 gm/kg  2.8 L fluid/day BSA x 1500    Recommendations:   Encouraged increased intake of vegetables. Focus on including vegetables in each meal.  Continue 20 minutes of exercise 4x a week  Drinking water before meals  Stop Melatonin    Materials Provided/Reviewed: Mediterranean Diet handout    Nutrition Monitoring and Evaluation    Monitor: weight, diet education needs , and adherence to nutrition recommendations     Goals: start with increasing vegetable intake and continue 20 minutes of exercise 4x a week.    Follow up In 1  months    Communication to referring provider/care team: Note available in chart. Dr. Conteh about supplement options.    Consultation Time: 60 Minutes    David Sorto RD-AP, , LDN  Advanced Practice in Clinical Nutrition  Board Certified Specialist in Oncology Nutrition   [facility], [facility]  [phone]

## 2024-04-04 ENCOUNTER — CLINICAL SUPPORT (OUTPATIENT)
Dept: HEMATOLOGY/ONCOLOGY | Facility: CLINIC | Age: 49
End: 2024-04-04
Payer: COMMERCIAL

## 2024-04-04 DIAGNOSIS — Z71.3 NUTRITIONAL COUNSELING: Primary | ICD-10-CM

## 2024-04-04 DIAGNOSIS — C92.10 CML (CHRONIC MYELOCYTIC LEUKEMIA): ICD-10-CM

## 2024-04-04 PROCEDURE — 99999 PR PBB SHADOW E&M-EST. PATIENT-LVL I: CPT | Mod: PBBFAC,,,

## 2024-04-04 NOTE — Clinical Note
Hi Dr. Conteh - met with MsEvelio Lori this morning for nutrition assessment referred by Integrative Oncology team. She is wanting to improve energy level and reduce inflammation. I wanted to get your thoughts on mushroom supplements like Turkey Tail or Loins Neel or Vitmain B Complex while on Gleevec. I am not familiar with any potential interactions.   Thoughts? David Sorto RD-AP, , LDN Advanced Practice in Clinical Nutrition  Board Certified Specialist in Oncology Nutrition  [facility], 3[facility] [phone]

## 2024-04-09 ENCOUNTER — CLINICAL SUPPORT (OUTPATIENT)
Dept: REHABILITATION | Facility: HOSPITAL | Age: 49
End: 2024-04-09

## 2024-04-09 DIAGNOSIS — M54.50 CHRONIC BILATERAL LOW BACK PAIN WITHOUT SCIATICA: ICD-10-CM

## 2024-04-09 DIAGNOSIS — G89.29 CHRONIC BILATERAL LOW BACK PAIN WITHOUT SCIATICA: ICD-10-CM

## 2024-04-09 DIAGNOSIS — C92.10 CML (CHRONIC MYELOCYTIC LEUKEMIA): ICD-10-CM

## 2024-04-09 PROCEDURE — 97810 ACUP 1/> WO ESTIM 1ST 15 MIN: CPT | Mod: SP | Performed by: ACUPUNCTURIST

## 2024-04-09 PROCEDURE — 97811 ACUP 1/> W/O ESTIM EA ADD 15: CPT | Mod: SP | Performed by: ACUPUNCTURIST

## 2024-04-09 NOTE — PROGRESS NOTES
"  Acupuncture Evaluation Note     Name: Audie Sandoval  Clinic Number: [id number]    Traditional Chinese Medicine (TCM) Diagnosis: Qi Stagnation and Blood Stasis  Medical Diagnosis:   Encounter Diagnoses   Name Primary?    CML (chronic myelocytic leukemia)     Chronic bilateral low back pain without sciatica         Evaluation Date: 4/9/2024    Visit #/Visits authorized: 12, 1/12    Precautions: Standard and cancer    Subjective     Chief Concern: low back pain with hip involvement, patient has history of high blood pressure. No diagnosis of anxiety but feels like nervous system is running on adrenalin or completely depleted. Feelings fatigued    Medical necessity is demonstrated by the following IMPAIRMENTS: Medical Necessity: Decreased mobility limits day to day activities, social, and emergent situations              Aggravating Factors:  lying down, prolonged sitting, and stress   Relieving Factors:  rest and movement. Takes motrin sometimes to help    Symptom Description:     Quality:  Aching, Dull, and Tight  Severity:  7  Frequency:  continuously    Previous Treatments Tried:  Medication, Therapy , and Acupuncture    HEENT:  none    Chest:  none    Digestion:     Diet: in general, a "healthy" diet  , gluten free   Fluids: coffee 1x /day, is drinking plenty of fluids, social drinker  Taste/Appetite: eats 3 meals a day   Symptoms: none    Sleep: 7 hours a night - sleeps through the night. Been taking half a melatonin.     Energy Levels:  very low, patient states they feel like "0"     Psychological Symptoms:  anxiety or depression has no history - checking in with psych here    Other Symptoms: none    GYN Symptoms: ovulation times extremely fatigued - last period was 3/23/2023. Normal but feels like in way of supriya menopause.     Objective     Observation: fatigued    Tongue:      Body:  scalloped and swollen   Color:   pale   Coating:  thin,     Pulse:        deep and weak        New Findings:  none    Treatment "     Treatment Principles:  raise qi, move qi and blood    Acupuncture points used:  4 SANDS, Du20, Gb34, Ki3, Ki6, Lu7, ROSAS MEN, Sp10, Sp6, Sp9, SSC, St36, and YIN PACHECO  + Ren26  Needles In: 20  Needles Out: 20  Needles W/O STIM placed: 8:30 AM  [place] W/O STIM removed: 9:00 AM      Other Traditional Chinese Medicine Modalities -  heat lamp    Assessment     After treatment, patient felt good, relaxed. Continue to eval symptoms.     Patient prognosis is Good.     Patient will continue to benefit from acupuncture treatment to address the deficits listed in the problem list box on initial evaluation, provide patient family education and to maximize pt's level of independence in the home and community environment.     Patient's spiritual, cultural and educational needs considered and pt agreeable to plan of care and goals.     Anticipated barriers to treatment: none    Plan     Recommend as needed    Education:  Patient is aware of cumulative benefit of acupuncture

## 2024-04-11 ENCOUNTER — PATIENT MESSAGE (OUTPATIENT)
Dept: HEMATOLOGY/ONCOLOGY | Facility: CLINIC | Age: 49
End: 2024-04-11
Payer: COMMERCIAL

## 2024-04-17 ENCOUNTER — PATIENT MESSAGE (OUTPATIENT)
Dept: HEMATOLOGY/ONCOLOGY | Facility: CLINIC | Age: 49
End: 2024-04-17
Payer: COMMERCIAL

## 2024-04-24 ENCOUNTER — PATIENT OUTREACH (OUTPATIENT)
Dept: ADMINISTRATIVE | Facility: HOSPITAL | Age: 49
End: 2024-04-24
Payer: COMMERCIAL

## 2024-04-24 NOTE — PROGRESS NOTES
Population Health Chart Review & Patient Outreach Details      Additional [facility] Health Notes:               Updates Requested / Reviewed:      Care Everywhere, , and Immunizations Reconciliation Completed or Queried: [facility] Topics Overdue:      [facility] Score: 3     Colon Cancer Screening  Mammogram  Hemoglobin A1c                       Health Maintenance Topic(s) Outreach Outcomes & Actions Taken:    Primary Care Appt - Outreach Outcomes & Actions Taken  : Primary Care Appt Scheduled

## 2024-04-29 ENCOUNTER — PATIENT MESSAGE (OUTPATIENT)
Dept: HEMATOLOGY/ONCOLOGY | Facility: CLINIC | Age: 49
End: 2024-04-29
Payer: COMMERCIAL

## 2024-04-29 DIAGNOSIS — C92.10 CML (CHRONIC MYELOCYTIC LEUKEMIA): ICD-10-CM

## 2024-04-30 RX ORDER — IMATINIB MESYLATE 100 MG/1
200 TABLET, FILM COATED ORAL DAILY
Qty: 60 TABLET | Refills: 11 | Status: SHIPPED | OUTPATIENT
Start: 2024-04-30

## 2024-05-13 ENCOUNTER — OFFICE VISIT (OUTPATIENT)
Dept: INTERNAL MEDICINE | Facility: CLINIC | Age: 49
End: 2024-05-13
Payer: COMMERCIAL

## 2024-05-13 VITALS
OXYGEN SATURATION: 98 % | HEART RATE: 83 BPM | SYSTOLIC BLOOD PRESSURE: 134 MMHG | DIASTOLIC BLOOD PRESSURE: 88 MMHG | TEMPERATURE: 98 F | BODY MASS INDEX: 36.61 KG/M2 | HEIGHT: 60 IN | WEIGHT: 186.5 LBS | RESPIRATION RATE: 16 BRPM

## 2024-05-13 DIAGNOSIS — Z79.899 DRUG-INDUCED IMMUNODEFICIENCY: ICD-10-CM

## 2024-05-13 DIAGNOSIS — E66.01 SEVERE OBESITY (BMI 35.0-39.9) WITH COMORBIDITY: ICD-10-CM

## 2024-05-13 DIAGNOSIS — D84.821 DRUG-INDUCED IMMUNODEFICIENCY: ICD-10-CM

## 2024-05-13 DIAGNOSIS — C92.10 CML (CHRONIC MYELOCYTIC LEUKEMIA): ICD-10-CM

## 2024-05-13 DIAGNOSIS — Z00.00 WELL ADULT EXAM: Primary | ICD-10-CM

## 2024-05-13 DIAGNOSIS — I10 ESSENTIAL HYPERTENSION: ICD-10-CM

## 2024-05-13 PROCEDURE — 1159F MED LIST DOCD IN RCRD: CPT | Mod: CPTII,S$GLB,, | Performed by: FAMILY MEDICINE

## 2024-05-13 PROCEDURE — 3008F BODY MASS INDEX DOCD: CPT | Mod: CPTII,S$GLB,, | Performed by: FAMILY MEDICINE

## 2024-05-13 PROCEDURE — 3075F SYST BP GE 130 - 139MM HG: CPT | Mod: CPTII,S$GLB,, | Performed by: FAMILY MEDICINE

## 2024-05-13 PROCEDURE — 4010F ACE/ARB THERAPY RXD/TAKEN: CPT | Mod: CPTII,S$GLB,, | Performed by: FAMILY MEDICINE

## 2024-05-13 PROCEDURE — 1160F RVW MEDS BY RX/DR IN RCRD: CPT | Mod: CPTII,S$GLB,, | Performed by: FAMILY MEDICINE

## 2024-05-13 PROCEDURE — 99396 PREV VISIT EST AGE 40-64: CPT | Mod: S$GLB,,, | Performed by: FAMILY MEDICINE

## 2024-05-13 PROCEDURE — 99999 PR PBB SHADOW E&M-EST. PATIENT-LVL IV: CPT | Mod: PBBFAC,,, | Performed by: FAMILY MEDICINE

## 2024-05-13 PROCEDURE — 3079F DIAST BP 80-89 MM HG: CPT | Mod: CPTII,S$GLB,, | Performed by: FAMILY MEDICINE

## 2024-05-13 RX ORDER — VALACYCLOVIR HYDROCHLORIDE 1 G/1
1000 TABLET, FILM COATED ORAL 2 TIMES DAILY
Qty: 30 TABLET | Refills: 6 | Status: SHIPPED | OUTPATIENT
Start: 2024-05-13

## 2024-05-13 RX ORDER — LOSARTAN POTASSIUM 25 MG/1
12.5 TABLET ORAL DAILY
Qty: 30 TABLET | Refills: 11 | Status: SHIPPED | OUTPATIENT
Start: 2024-05-13 | End: 2025-05-13

## 2024-05-13 NOTE — PROGRESS NOTES
Subjective     Patient ID: Audie Sandoval is a 48 y.o. female.    Chief Complaint: Annual Exam    HPI 48-year-old female presents to clinic today for annual physical exam.  Hypertension remains well controlled on losartan 12.5 mg daily.  She continues to be followed by Hematology/Oncology for treatment of CML which currently remains stable on Gleevec.  She reports a past surgical history of  and tonsillectomy.  She reports a family history of diabetes and hypertension.  She is up-to-date with all screening exams.  Review of Systems   Constitutional:  Negative for appetite change, chills, fatigue and fever.   HENT:  Negative for nasal congestion, ear pain, hearing loss, postnasal drip, rhinorrhea, sinus pressure/congestion, sore throat and tinnitus.    Eyes:  Negative for redness, itching and visual disturbance.   Respiratory:  Negative for cough, chest tightness and shortness of breath.    Cardiovascular:  Negative for chest pain and palpitations.   Gastrointestinal:  Negative for abdominal pain, constipation, diarrhea, nausea and vomiting.   Genitourinary:  Negative for decreased urine volume, difficulty urinating, dysuria, frequency, hematuria and urgency.   Musculoskeletal:  Negative for back pain, myalgias, neck pain and neck stiffness.   Integumentary:  Negative for rash.   Neurological:  Negative for dizziness, light-headedness and headaches.   Psychiatric/Behavioral: Negative.            Objective     Physical Exam  Vitals and nursing note reviewed.   Constitutional:       General: She is not in acute distress.     Appearance: She is well-developed. She is not diaphoretic.   HENT:      Head: Normocephalic and atraumatic.      Right Ear: External ear normal.      Left Ear: External ear normal.      Nose: Nose normal.      Mouth/Throat:      Pharynx: No oropharyngeal exudate.   Eyes:      General: No scleral icterus.        Right eye: No discharge.         Left eye: No discharge.       Conjunctiva/sclera: Conjunctivae normal.      Pupils: Pupils are equal, round, and reactive to light.   Neck:      Thyroid: No thyromegaly.      Vascular: No JVD.      Trachea: No tracheal deviation.   Cardiovascular:      Rate and Rhythm: Normal rate and regular rhythm.      Heart sounds: Normal heart sounds. No murmur heard.     No friction rub. No gallop.   Pulmonary:      Effort: Pulmonary effort is normal. No respiratory distress.      Breath sounds: Normal breath sounds. No stridor. No wheezing or rales.   Abdominal:      General: Bowel sounds are normal. There is no distension.      Palpations: Abdomen is soft. There is no mass.      Tenderness: There is no abdominal tenderness. There is no guarding or rebound.   Musculoskeletal:         General: No tenderness. Normal range of motion.      Cervical back: Normal range of motion and neck supple.   Lymphadenopathy:      Cervical: No cervical adenopathy.   Skin:     General: Skin is warm and dry.      Coloration: Skin is not pale.      Findings: No erythema or rash.   Neurological:      Mental Status: She is alert and oriented to person, place, and time.   Psychiatric:         Behavior: Behavior normal.         Thought Content: Thought content normal.         Judgment: Judgment normal.            Assessment and Plan     1. Well adult exam    2. Essential hypertension  -     losartan (COZAAR) 25 MG tablet; Take 0.5 tablets (12.5 mg total) by mouth once daily.  Dispense: 30 tablet; Refill: 11    3. CML (chronic myelocytic leukemia)    4. Drug-induced immunodeficiency    5. Severe obesity (BMI 35.0-39.9) with comorbidity    Other orders  -     valACYclovir (VALTREX) 1000 MG tablet; Take 1 tablet (1,000 mg total) by mouth 2 (two) times daily.  Dispense: 30 tablet; Refill: 6        1. Labs have been reviewed and remains stable.    2. Continue losartan 12.5 mg daily.  Hypertension is well controlled.  3. Continue Gleevec and continue follow-up with hematology/oncology  as scheduled.  CML is stable.  4. Continue diet and exercise.  Weight remains stable.    5. Return to clinic as needed or in 1 year for annual physical exam.             Follow up in about 1 year (around 5/13/2025), or if symptoms worsen or fail to improve, for Annual exam.

## 2024-05-23 ENCOUNTER — OFFICE VISIT (OUTPATIENT)
Dept: OBSTETRICS AND GYNECOLOGY | Facility: CLINIC | Age: 49
End: 2024-05-23
Attending: OBSTETRICS & GYNECOLOGY
Payer: COMMERCIAL

## 2024-05-23 VITALS
HEART RATE: 84 BPM | HEIGHT: 60 IN | DIASTOLIC BLOOD PRESSURE: 91 MMHG | SYSTOLIC BLOOD PRESSURE: 143 MMHG | BODY MASS INDEX: 36.52 KG/M2 | WEIGHT: 186 LBS

## 2024-05-23 DIAGNOSIS — Z01.419 ENCOUNTER FOR GYNECOLOGICAL EXAMINATION WITHOUT ABNORMAL FINDING: Primary | ICD-10-CM

## 2024-05-23 DIAGNOSIS — Z12.31 ENCOUNTER FOR SCREENING MAMMOGRAM FOR MALIGNANT NEOPLASM OF BREAST: ICD-10-CM

## 2024-05-23 DIAGNOSIS — Z82.49 FAMILY HISTORY OF HEART DISEASE: ICD-10-CM

## 2024-05-23 DIAGNOSIS — Z12.4 SCREENING FOR MALIGNANT NEOPLASM OF THE CERVIX: ICD-10-CM

## 2024-05-23 DIAGNOSIS — C92.10 CML (CHRONIC MYELOCYTIC LEUKEMIA): ICD-10-CM

## 2024-05-23 DIAGNOSIS — Z87.59 HISTORY OF GESTATIONAL HYPERTENSION: ICD-10-CM

## 2024-05-23 DIAGNOSIS — E66.01 SEVERE OBESITY (BMI 35.0-39.9) WITH COMORBIDITY: ICD-10-CM

## 2024-05-23 DIAGNOSIS — R73.01 ELEVATED FASTING GLUCOSE: ICD-10-CM

## 2024-05-23 PROCEDURE — 87624 HPV HI-RISK TYP POOLED RSLT: CPT | Performed by: OBSTETRICS & GYNECOLOGY

## 2024-05-23 PROCEDURE — 4010F ACE/ARB THERAPY RXD/TAKEN: CPT | Mod: CPTII,S$GLB,, | Performed by: OBSTETRICS & GYNECOLOGY

## 2024-05-23 PROCEDURE — 3080F DIAST BP >= 90 MM HG: CPT | Mod: CPTII,S$GLB,, | Performed by: OBSTETRICS & GYNECOLOGY

## 2024-05-23 PROCEDURE — 3077F SYST BP >= 140 MM HG: CPT | Mod: CPTII,S$GLB,, | Performed by: OBSTETRICS & GYNECOLOGY

## 2024-05-23 PROCEDURE — 1159F MED LIST DOCD IN RCRD: CPT | Mod: CPTII,S$GLB,, | Performed by: OBSTETRICS & GYNECOLOGY

## 2024-05-23 PROCEDURE — 99396 PREV VISIT EST AGE 40-64: CPT | Mod: S$GLB,,, | Performed by: OBSTETRICS & GYNECOLOGY

## 2024-05-23 PROCEDURE — 88175 CYTOPATH C/V AUTO FLUID REDO: CPT | Performed by: OBSTETRICS & GYNECOLOGY

## 2024-05-23 PROCEDURE — 99999 PR PBB SHADOW E&M-EST. PATIENT-LVL III: CPT | Mod: PBBFAC,,, | Performed by: OBSTETRICS & GYNECOLOGY

## 2024-05-23 PROCEDURE — 3008F BODY MASS INDEX DOCD: CPT | Mod: CPTII,S$GLB,, | Performed by: OBSTETRICS & GYNECOLOGY

## 2024-05-23 NOTE — PROGRESS NOTES
"SUBJECTIVE:   48 y.o. female   for annual routine Pap and checkup. Patient's last menstrual period was 2024 (exact date)..  She reports having regular cycles. She reports her "hormones are off " during ovulation. She denies hot flashes or night sweats. She reports fatigue and "stuck at same weight." She tried Ozempic and lost 40 pounds  She did acupuncture and met with nutritionist.    She had nausea and did not feel well on Ozempic but she was undiagnosed with her leukemia at that time - had elevated WBC count though and was diagnosed shortly after    Past Medical History:   Diagnosis Date    Allergy     Chronic myeloid leukemia 2023    Gestational hypertension      Past Surgical History:   Procedure Laterality Date     SECTION      TONSILLECTOMY       Social History     Socioeconomic History    Marital status:    Tobacco Use    Smoking status: Never    Smokeless tobacco: Never   Substance and Sexual Activity    Alcohol use: Yes     Comment: Rare    Drug use: Never    Sexual activity: Yes     Partners: Male     Social Determinants of Health     Financial Resource Strain: Medium Risk (2023)    Overall Financial Resource Strain (CARDIA)     Difficulty of Paying Living Expenses: Somewhat hard   Food Insecurity: No Food Insecurity (2023)    Hunger Vital Sign     Worried About Running Out of Food in the Last Year: Never true     Ran Out of Food in the Last Year: Never true   Transportation Needs: No Transportation Needs (2023)    PRAPARE - Transportation     Lack of Transportation (Medical): No     Lack of Transportation (Non-Medical): No   Physical Activity: Insufficiently Active (2023)    Exercise Vital Sign     Days of Exercise per Week: 2 days     Minutes of Exercise per Session: 30 min   Stress: Stress Concern Present (2023)    [employer] - Occupational Stress Questionnaire     Feeling of Stress : Very much   Housing " Stability: Low Risk  (2023)    Housing Stability Vital Sign     Unable to Pay for Housing in the Last Year: No     Number of Places Lived in the Last Year: 1     Unstable Housing in the Last Year: No     Family History   Problem Relation Name Age of Onset    Abnormal EKG Father      Diabetes Father      Hypertension Mother      Arthritis Mother          DJD spine    Breast cancer Neg Hx      Ovarian cancer Neg Hx      Uterine cancer Neg Hx      Cervical cancer Neg Hx      Cancer Neg Hx      Colon cancer Neg Hx       OB History    Para Term  AB Living   2 2           SAB IAB Ectopic Multiple Live Births                  # Outcome Date GA Lbr Ward/2nd Weight Sex Type Anes PTL Lv   2 Para      CS-LTranv      1 Para      CS-LTranv              Current Outpatient Medications   Medication Sig Dispense Refill    acyclovir (ZOVIRAX) 400 MG tablet Take 400 mg by mouth 2 (two) times daily.      ALBUTEROL, REFILL, INHL       imatinib (GLEEVEC) 100 MG Tab Take 2 tablets (200 mg total) by mouth once daily. 60 tablet 11    losartan (COZAAR) 25 MG tablet Take 0.5 tablets (12.5 mg total) by mouth once daily. 30 tablet 11    ondansetron (ZOFRAN) 4 MG tablet Take 1 tablet (4 mg total) by mouth every 6 (six) hours as needed for Nausea. 30 tablet 11    valACYclovir (VALTREX) 1000 MG tablet Take 1 tablet (1,000 mg total) by mouth 2 (two) times daily. 30 tablet 6     No current facility-administered medications for this visit.     Allergies: Codeine and Wheat containing prod     The 10-year ASCVD risk score (Lubna DK, et al., 2019) is: 2%    Values used to calculate the score:      Age: 48 years      Sex: Female      Is Non- : No      Diabetic: No      Tobacco smoker: No      Systolic Blood Pressure: 143 mmHg      Is BP treated: Yes      HDL Cholesterol: 46 mg/dL      Total Cholesterol: 203 mg/dL      ROS:  Constitutional: no weight loss, weight gain, fever, fatigue  Eyes:  No vision changes,  glasses/contacts  ENT/Mouth: No ulcers, sinus problems, ears ringing, headache  Cardiovascular: No inability to lie flat, chest pain, exercise intolerance, swelling, heart palpitations  Respiratory: No wheezing, coughing blood, shortness of breath, or cough  Gastrointestinal: No diarrhea, bloody stool, nausea/vomiting, constipation, gas, hemorrhoids  Genitourinary: No blood in urine, painful urination, urgency of urination, frequency of urination, incomplete emptying, incontinence, abnormal bleeding, painful periods, heavy periods, vaginal discharge, vaginal odor, painful intercourse, sexual problems, bleeding after intercourse.  Musculoskeletal: No muscle weakness  Skin/Breast: No painful breasts, nipple discharge, masses, rash, ulcers  Neurological: No passing out, seizures, numbness, headache  Endocrine: No diabetes, hypothyroid, hyperthyroid, hot flashes, hair loss, abnormal hair growth, acne  Psychiatric: No depression, crying  Hematologic: No bruises, bleeding, swollen lymph nodes, anemia.      Physical Exam:   Constitutional: She is oriented to person, place, and time. She appears well-developed and well-nourished.      Neck: No tracheal deviation present. No thyromegaly present.    Cardiovascular:       Exam reveals no edema.        Pulmonary/Chest: Effort normal. She exhibits no mass, no tenderness, no deformity and no retraction. Right breast exhibits no inverted nipple, no mass, no nipple discharge, no skin change, no tenderness, presence, no bleeding and no swelling. Left breast exhibits no inverted nipple, no mass, no nipple discharge, no skin change, no tenderness, presence, no bleeding and no swelling. Breasts are symmetrical.        Abdominal: Soft. She exhibits no distension and no mass. There is no abdominal tenderness. There is no rebound and no guarding. No hernia. Hernia confirmed negative in the left inguinal area.     Genitourinary:    Vagina and uterus normal.   Rectum:      No external  hemorrhoid.   There is no rash, tenderness or lesion on the right labia. There is no rash, tenderness or lesion on the left labia. Cervix is normal. No no adexnal prolapse. Right adnexum displays no mass, no tenderness and no fullness. Left adnexum displays no mass, no tenderness and no fullness. No vaginal discharge, tenderness, bleeding, rectocele, cystocele or prolapse of vaginal walls in the vagina. Cervix exhibits no motion tenderness, no discharge and no friability. Uterus is not deviated.           Musculoskeletal: Normal range of motion and moves all extremeties. No edema.       Neurological: She is alert and oriented to person, place, and time.    Skin: No rash noted. No erythema. No pallor.    Psychiatric: She has a normal mood and affect. Her behavior is normal. Judgment and thought content normal.     +blood in vagina    ASSESSMENT:   1. Encounter for gynecological examination without abnormal finding        2. Elevated fasting glucose  HEMOGLOBIN A1C      3. Family history of heart disease  Ambulatory referral/consult to Cardiology      4. History of gestational hypertension  Ambulatory referral/consult to Cardiology      5. Screening for malignant neoplasm of the cervix  Liquid-Based Pap Smear, Screening    HPV High Risk Genotypes, PCR      6. Encounter for screening mammogram for malignant neoplasm of breast  Mammo Digital Screening Bilat w/ [facility]      7. CML (chronic myelocytic leukemia)        8. Severe obesity (BMI 35.0-39.9) with comorbidity              PLAN:   Mammogram   Referral to Cardiology for family history of heart disease, history of gestational hypertension and current hypertension  HgbA1c today  We will discuss possible use of GLP 1 with Dr. Conteh

## 2024-05-28 LAB
FINAL PATHOLOGIC DIAGNOSIS: NORMAL
Lab: NORMAL

## 2024-05-29 LAB
HPV HR 12 DNA SPEC QL NAA+PROBE: NEGATIVE
HPV16 AG SPEC QL: NEGATIVE
HPV18 DNA SPEC QL NAA+PROBE: NEGATIVE

## 2024-05-30 ENCOUNTER — PATIENT MESSAGE (OUTPATIENT)
Dept: OBSTETRICS AND GYNECOLOGY | Facility: CLINIC | Age: 49
End: 2024-05-30
Payer: COMMERCIAL

## 2024-06-05 ENCOUNTER — PATIENT MESSAGE (OUTPATIENT)
Dept: OBSTETRICS AND GYNECOLOGY | Facility: CLINIC | Age: 49
End: 2024-06-05
Payer: COMMERCIAL

## 2024-06-17 ENCOUNTER — LAB VISIT (OUTPATIENT)
Dept: LAB | Facility: HOSPITAL | Age: 49
End: 2024-06-17
Payer: COMMERCIAL

## 2024-06-17 DIAGNOSIS — R73.01 ELEVATED FASTING GLUCOSE: ICD-10-CM

## 2024-06-17 DIAGNOSIS — C92.10 CML (CHRONIC MYELOCYTIC LEUKEMIA): ICD-10-CM

## 2024-06-17 LAB
ALBUMIN SERPL BCP-MCNC: 3.7 G/DL (ref 3.5–5.2)
ALP SERPL-CCNC: 64 U/L (ref 55–135)
ALT SERPL W/O P-5'-P-CCNC: 21 U/L (ref 10–44)
ANION GAP SERPL CALC-SCNC: 7 MMOL/L (ref 8–16)
AST SERPL-CCNC: 16 U/L (ref 10–40)
BASOPHILS # BLD AUTO: 0.02 K/UL (ref 0–0.2)
BASOPHILS NFR BLD: 0.3 % (ref 0–1.9)
BILIRUB SERPL-MCNC: 0.4 MG/DL (ref 0.1–1)
BUN SERPL-MCNC: 11 MG/DL (ref 6–20)
CALCIUM SERPL-MCNC: 8.7 MG/DL (ref 8.7–10.5)
CHLORIDE SERPL-SCNC: 107 MMOL/L (ref 95–110)
CO2 SERPL-SCNC: 23 MMOL/L (ref 23–29)
CREAT SERPL-MCNC: 0.7 MG/DL (ref 0.5–1.4)
DIFFERENTIAL METHOD BLD: ABNORMAL
EOSINOPHIL # BLD AUTO: 0.1 K/UL (ref 0–0.5)
EOSINOPHIL NFR BLD: 2.1 % (ref 0–8)
ERYTHROCYTE [DISTWIDTH] IN BLOOD BY AUTOMATED COUNT: 13.7 % (ref 11.5–14.5)
EST. GFR  (NO RACE VARIABLE): >60 ML/MIN/1.73 M^2
ESTIMATED AVG GLUCOSE: 97 MG/DL (ref 68–131)
GLUCOSE SERPL-MCNC: 83 MG/DL (ref 70–110)
HBA1C MFR BLD: 5 % (ref 4–5.6)
HCT VFR BLD AUTO: 40.7 % (ref 37–48.5)
HGB BLD-MCNC: 13.1 G/DL (ref 12–16)
IMM GRANULOCYTES # BLD AUTO: 0.02 K/UL (ref 0–0.04)
IMM GRANULOCYTES NFR BLD AUTO: 0.3 % (ref 0–0.5)
LYMPHOCYTES # BLD AUTO: 1.4 K/UL (ref 1–4.8)
LYMPHOCYTES NFR BLD: 23.2 % (ref 18–48)
MAGNESIUM SERPL-MCNC: 2 MG/DL (ref 1.6–2.6)
MCH RBC QN AUTO: 31.3 PG (ref 27–31)
MCHC RBC AUTO-ENTMCNC: 32.2 G/DL (ref 32–36)
MCV RBC AUTO: 97 FL (ref 82–98)
MONOCYTES # BLD AUTO: 0.3 K/UL (ref 0.3–1)
MONOCYTES NFR BLD: 4.5 % (ref 4–15)
NEUTROPHILS # BLD AUTO: 4.3 K/UL (ref 1.8–7.7)
NEUTROPHILS NFR BLD: 69.6 % (ref 38–73)
NRBC BLD-RTO: 0 /100 WBC
PHOSPHATE SERPL-MCNC: 2.4 MG/DL (ref 2.7–4.5)
PLATELET # BLD AUTO: 210 K/UL (ref 150–450)
PMV BLD AUTO: 10.8 FL (ref 9.2–12.9)
POTASSIUM SERPL-SCNC: 4.2 MMOL/L (ref 3.5–5.1)
PROT SERPL-MCNC: 6.4 G/DL (ref 6–8.4)
RBC # BLD AUTO: 4.19 M/UL (ref 4–5.4)
SODIUM SERPL-SCNC: 137 MMOL/L (ref 136–145)
WBC # BLD AUTO: 6.16 K/UL (ref 3.9–12.7)

## 2024-06-17 PROCEDURE — 84100 ASSAY OF PHOSPHORUS: CPT | Performed by: INTERNAL MEDICINE

## 2024-06-17 PROCEDURE — 83735 ASSAY OF MAGNESIUM: CPT | Performed by: INTERNAL MEDICINE

## 2024-06-17 PROCEDURE — 85025 COMPLETE CBC W/AUTO DIFF WBC: CPT | Performed by: INTERNAL MEDICINE

## 2024-06-17 PROCEDURE — 83036 HEMOGLOBIN GLYCOSYLATED A1C: CPT | Performed by: OBSTETRICS & GYNECOLOGY

## 2024-06-17 PROCEDURE — 36415 COLL VENOUS BLD VENIPUNCTURE: CPT | Performed by: INTERNAL MEDICINE

## 2024-06-17 PROCEDURE — 80053 COMPREHEN METABOLIC PANEL: CPT | Performed by: INTERNAL MEDICINE

## 2024-06-17 PROCEDURE — 81206 BCR/ABL1 GENE MAJOR BP: CPT | Performed by: INTERNAL MEDICINE

## 2024-06-19 ENCOUNTER — OFFICE VISIT (OUTPATIENT)
Dept: HEMATOLOGY/ONCOLOGY | Facility: CLINIC | Age: 49
End: 2024-06-19
Payer: COMMERCIAL

## 2024-06-19 ENCOUNTER — LAB VISIT (OUTPATIENT)
Dept: LAB | Facility: HOSPITAL | Age: 49
End: 2024-06-19
Payer: COMMERCIAL

## 2024-06-19 VITALS
TEMPERATURE: 98 F | OXYGEN SATURATION: 98 % | HEIGHT: 60 IN | DIASTOLIC BLOOD PRESSURE: 86 MMHG | HEART RATE: 84 BPM | WEIGHT: 186.63 LBS | SYSTOLIC BLOOD PRESSURE: 142 MMHG | BODY MASS INDEX: 36.64 KG/M2

## 2024-06-19 DIAGNOSIS — E61.1 IRON DEFICIENCY: ICD-10-CM

## 2024-06-19 DIAGNOSIS — C92.10 CML (CHRONIC MYELOID LEUKEMIA): Primary | ICD-10-CM

## 2024-06-19 LAB
BCR/ABL,P210 RESULT: NORMAL
FERRITIN SERPL-MCNC: 24 NG/ML (ref 20–300)
IRON SERPL-MCNC: 91 UG/DL (ref 30–160)
PATH REPORT.FINAL DX SPEC: NORMAL
RETICS/RBC NFR AUTO: 2.2 % (ref 0.5–2.5)
SATURATED IRON: 23 % (ref 20–50)
SPECIMEN TYPE: NORMAL
TOTAL IRON BINDING CAPACITY: 400 UG/DL (ref 250–450)
TRANSFERRIN SERPL-MCNC: 270 MG/DL (ref 200–375)

## 2024-06-19 PROCEDURE — 1159F MED LIST DOCD IN RCRD: CPT | Mod: CPTII,S$GLB,, | Performed by: INTERNAL MEDICINE

## 2024-06-19 PROCEDURE — 1160F RVW MEDS BY RX/DR IN RCRD: CPT | Mod: CPTII,S$GLB,, | Performed by: INTERNAL MEDICINE

## 2024-06-19 PROCEDURE — 99999 PR PBB SHADOW E&M-EST. PATIENT-LVL III: CPT | Mod: PBBFAC,,, | Performed by: INTERNAL MEDICINE

## 2024-06-19 PROCEDURE — 85045 AUTOMATED RETICULOCYTE COUNT: CPT | Performed by: INTERNAL MEDICINE

## 2024-06-19 PROCEDURE — 3079F DIAST BP 80-89 MM HG: CPT | Mod: CPTII,S$GLB,, | Performed by: INTERNAL MEDICINE

## 2024-06-19 PROCEDURE — 3077F SYST BP >= 140 MM HG: CPT | Mod: CPTII,S$GLB,, | Performed by: INTERNAL MEDICINE

## 2024-06-19 PROCEDURE — 4010F ACE/ARB THERAPY RXD/TAKEN: CPT | Mod: CPTII,S$GLB,, | Performed by: INTERNAL MEDICINE

## 2024-06-19 PROCEDURE — 99215 OFFICE O/P EST HI 40 MIN: CPT | Mod: S$GLB,,, | Performed by: INTERNAL MEDICINE

## 2024-06-19 PROCEDURE — 82728 ASSAY OF FERRITIN: CPT | Performed by: INTERNAL MEDICINE

## 2024-06-19 PROCEDURE — 36415 COLL VENOUS BLD VENIPUNCTURE: CPT | Performed by: INTERNAL MEDICINE

## 2024-06-19 PROCEDURE — 3008F BODY MASS INDEX DOCD: CPT | Mod: CPTII,S$GLB,, | Performed by: INTERNAL MEDICINE

## 2024-06-19 PROCEDURE — 83540 ASSAY OF IRON: CPT | Performed by: INTERNAL MEDICINE

## 2024-06-19 PROCEDURE — 3044F HG A1C LEVEL LT 7.0%: CPT | Mod: CPTII,S$GLB,, | Performed by: INTERNAL MEDICINE

## 2024-06-19 NOTE — PROGRESS NOTES
Section of Hematology and Stem Cell Transplantation  Follow Up Visit     Date of visit: 24  Visit diagnosis: CML (chronic myeloid leukemia) [C92.10]    Oncologic History:     Primary Oncologic Diagnosis: Chronic myeloid leukemia, chronic phase    23: Routine labs notable for , Hgb 11.1, Plts 165.  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 Karyotype 46,XX,t(9;22)(q34;q11.2)[20]. BCR/ABL 27%. She had low risk disease by Sokal.   3/26/23: She started imatinib 400mg daily.   23: BCR/ABL at 3 months - 5.5%  23: Imatinib decreased to 300mg daily due to N/V and facial swelling.  23: BCR/ABL at 6 months - 0.4%  10/12/23: Imatinib decreased to 200mg daily due to persistent N/V and facial swelling.  23: BCR/ABL at 9 months - 1.3%  24: Recheck BCR/ABL - 1.0%  3/6/24: BCR/ABL 0.7%    History of Present Ilness:   Audie Sandoval (Audie) is a pleasant 48 y.o.female who presents for follow up regarding CML.  She continues to endorse significant fatigue and daytime sleepiness.  She has been working with [facility].  She would like to decrease imatinib due to concerns that this may be contributing to her fatigue.    PAST MEDICAL HISTORY:   Past Medical History:   Diagnosis Date    Allergy     Chronic myeloid leukemia 2023    Gestational hypertension        PAST SURGICAL HISTORY:   Past Surgical History:   Procedure Laterality Date     SECTION      TONSILLECTOMY         PAST SOCIAL HISTORY:  Social History     Tobacco Use    Smoking status: Never    Smokeless tobacco: Never   Substance Use Topics    Alcohol use: Yes     Comment: Rare    Drug use: Never       FAMILY HISTORY:  Family History   Problem Relation Name Age of Onset    Abnormal EKG Father      Diabetes Father      Hypertension Mother      Arthritis Mother          DJD spine    Breast cancer Neg Hx      Ovarian cancer Neg Hx      Uterine cancer Neg Hx      Cervical cancer Neg Hx      Cancer Neg  Hx      Colon cancer Neg Hx         CURRENT MEDICATIONS:   Current Outpatient Medications   Medication Sig    acyclovir (ZOVIRAX) 400 MG tablet Take 400 mg by mouth 2 (two) times daily.    ALBUTEROL, REFILL, INHL     imatinib (GLEEVEC) 100 MG Tab Take 2 tablets (200 mg total) by mouth once daily.    losartan (COZAAR) 25 MG tablet Take 0.5 tablets (12.5 mg total) by mouth once daily.    ondansetron (ZOFRAN) 4 MG tablet Take 1 tablet (4 mg total) by mouth every 6 (six) hours as needed for Nausea.    valACYclovir (VALTREX) 1000 MG tablet Take 1 tablet (1,000 mg total) by mouth 2 (two) times daily.     No current facility-administered medications for this visit.       ALLERGIES:   Review of patient's allergies indicates:   Allergen Reactions    Codeine     Wheat containing prod        Review of Systems:     Pertinent positives and negatives included in the HPI. Otherwise a complete review of systems is negative.    Physical Exam:     Vitals:    06/19/24 0847   BP: (!) 142/86   Pulse: 84   Temp: 98.1 °F (36.7 °C)     General: Appears well, NAD  Pulmonary: CTAB, no increased work of breathing, no W/R/C  Cardiovascular: S1S2 normal, RRR, no M/R/G  Abdominal: Soft, NT, ND, BS+, no HSM  Extremities: No C/C/E  Neurological: AAOx4, grossly normal, no focal deficits  Dermatologic: No appreciable rashes or lesions    ECOG Performance Status: (foot note - ECOG PS provided by [employer]) 0 - Asymptomatic    Karnofsky Performance Score:  100%- Normal, No Complaints, No Evidence of Disease    Labs:   Lab Results   Component Value Date    WBC 6.16 06/17/2024    RBC 4.19 06/17/2024    HGB 13.1 06/17/2024    HCT 40.7 06/17/2024    MCV 97 06/17/2024    MCH 31.3 (H) 06/17/2024    MCHC 32.2 06/17/2024    RDW 13.7 06/17/2024     06/17/2024    MPV 10.8 06/17/2024    GRAN 4.3 06/17/2024    GRAN 69.6 06/17/2024    LYMPH 1.4 06/17/2024    LYMPH 23.2 06/17/2024    MONO 0.3 06/17/2024    MONO 4.5 06/17/2024    EOS  0.1 06/17/2024    BASO 0.02 06/17/2024    EOSINOPHIL 2.1 06/17/2024    BASOPHIL 0.3 06/17/2024       CMP  Sodium   Date Value Ref Range Status   06/17/2024 137 136 - 145 mmol/L Final     Potassium   Date Value Ref Range Status   06/17/2024 4.2 3.5 - 5.1 mmol/L Final     Chloride   Date Value Ref Range Status   06/17/2024 107 95 - 110 mmol/L Final     CO2   Date Value Ref Range Status   06/17/2024 23 23 - 29 mmol/L Final     Glucose   Date Value Ref Range Status   06/17/2024 83 70 - 110 mg/dL Final     BUN   Date Value Ref Range Status   06/17/2024 11 6 - 20 mg/dL Final     Creatinine   Date Value Ref Range Status   06/17/2024 0.7 0.5 - 1.4 mg/dL Final     Calcium   Date Value Ref Range Status   06/17/2024 8.7 8.7 - 10.5 mg/dL Final     Total Protein   Date Value Ref Range Status   06/17/2024 6.4 6.0 - 8.4 g/dL Final     Albumin   Date Value Ref Range Status   06/17/2024 3.7 3.5 - 5.2 g/dL Final     Total Bilirubin   Date Value Ref Range Status   06/17/2024 0.4 0.1 - 1.0 mg/dL Final     Comment:     For infants and newborns, interpretation of results should be based  on gestational age, weight and in agreement with clinical  observations.    Premature Infant recommended reference ranges:  Up to 24 hours.............<8.0 mg/dL  Up to 48 hours............<12.0 mg/dL  3-5 days..................<15.0 mg/dL  6-29 days.................<15.0 mg/dL       Alkaline Phosphatase   Date Value Ref Range Status   06/17/2024 64 55 - 135 U/L Final     AST   Date Value Ref Range Status   06/17/2024 16 10 - 40 U/L Final     ALT   Date Value Ref Range Status   06/17/2024 21 10 - 44 U/L Final     Anion Gap   Date Value Ref Range Status   06/17/2024 7 (L) 8 - 16 mmol/L Final     eGFR if    Date Value Ref Range Status   12/15/2020 >60.0 >60 mL/min/1.73 m^2 Final     eGFR if non    Date Value Ref Range Status   12/15/2020 >60.0 >60 mL/min/1.73 m^2 Final     Comment:     Calculation used to obtain the  estimated glomerular filtration  rate (eGFR) is the CKD-EPI equation.          Imaging:   No results found for this or any previous visit (from the past 2160 hour(s)).    Pathology:  Reviewed     Assessment and Plan:   Audie Sandoval (Audie) is a pleasant 48 y.o.female with CP-CML who presents for follow up.    Chronic myeloid leukemia, chronic phase:  She was diagnosed with chronic phase CML on 3/8/23. CG with t(9;22) without additional chromosomal abnormalities. She has low risk disease by Sokal. She started imatinib 400mg daily on 3/26/23, but she had N/V and facial swelling ultimately requiring dose reductions to 200mg daily (since 10/2023). Her BCR/ABL decreased appropriately by 6 months (9/2023) to 0.4%; however, more recently it increased to 1.3%. Repeat on 1/30/24 noted improvement to 1%. Repeat on 3/6/24 revealed an improvement to 0.7%.   - Ok to decrease imatinib to 100mg daily.  - Repeat BCR/ABL in 3 months. BCR/ABL pending from yesterday.    Chemotherapy-induced nausea/vomiting:  Zofran as needed.    Iron deficiency:  Iron stores previously notable for mild iron deficiency without anemia.  Iron sat 16% 3 months ago. I believe this may be contributing to her fatigue.  Will arrange for Injectafer 750 mg x 1.    Orders/Follow Up:      Orders Placed This Encounter    Iron and TIBC    Ferritin    Reticulocytes       Route Chart for Scheduling    BMT Chart Routing      Follow up with physician 3 months.   Follow up with MICHELL    Provider visit type    Infusion scheduling note    Injection scheduling note    Labs CBC, CMP, BCR/ABL, phosphorus and magnesium   Scheduling:  Preferred lab:  Lab interval:  1 week prior to visit   Imaging    Pharmacy appointment    Other referrals                    Supportive Plan Information  IV FLUIDS AND ELECTROLYTES   Jon Conteh MD   Upcoming Treatment Dates - IV FLUIDS AND ELECTROLYTES    No upcoming days in selected categories.    Therapy Plan  Information  Medications  ferric carboxymaltose (INJECTAFER) 750 mg in sodium chloride 0.9% 265 mL infusion  750 mg, Intravenous, Once  IV Fluids  0.9%  NaCl infusion  Intravenous, Once  Anaphylaxis/Hypersensitivity  EPINEPHrine (EPIPEN) 0.3 mg/0.3 mL pen injection 0.3 mg  0.3 mg, Intramuscular, Once  diphenhydrAMINE injection 50 mg  50 mg, Intravenous, Once  hydrocortisone sodium succinate injection 100 mg  100 mg, Intravenous, Once  Flushes  sodium chloride 0.9% flush 10 mL  10 mL, Intravenous, Once  heparin, porcine (PF) 100 unit/mL injection flush 500 Units  5 mL, Intravenous, Once  sodium chloride 0.9% 100 mL flush bag  Intravenous, Once      Total time of this visit was 40 minutes, including time spent face to face with patient and/or via video/audio, and also in preparing for today's visit for MDM and documentation. (Medical Decision Making, including consideration of possible diagnoses, management options, complex medical record review, review of diagnostic tests and information, consideration and discussion of significant complications based on comorbidities, and discussion with providers involved with the care of the patient). Greater than 50% was spent face to face with the patient counseling and coordinating care.    Hamzah Conteh MD  Malignant Hematology, Stem Cell Transplant, and Cellular Therapy  [facility] [place]  [facility] [place]

## 2024-07-09 ENCOUNTER — PATIENT MESSAGE (OUTPATIENT)
Dept: HEMATOLOGY/ONCOLOGY | Facility: CLINIC | Age: 49
End: 2024-07-09
Payer: COMMERCIAL

## 2024-07-09 ENCOUNTER — TELEPHONE (OUTPATIENT)
Dept: HEMATOLOGY/ONCOLOGY | Facility: CLINIC | Age: 49
End: 2024-07-09
Payer: COMMERCIAL

## 2024-07-10 ENCOUNTER — PATIENT MESSAGE (OUTPATIENT)
Dept: HEMATOLOGY/ONCOLOGY | Facility: CLINIC | Age: 49
End: 2024-07-10
Payer: COMMERCIAL

## 2024-07-10 ENCOUNTER — TELEPHONE (OUTPATIENT)
Dept: HEMATOLOGY/ONCOLOGY | Facility: CLINIC | Age: 49
End: 2024-07-10
Payer: COMMERCIAL

## 2024-07-16 ENCOUNTER — OFFICE VISIT (OUTPATIENT)
Dept: OBSTETRICS AND GYNECOLOGY | Facility: CLINIC | Age: 49
End: 2024-07-16
Payer: COMMERCIAL

## 2024-07-16 VITALS
WEIGHT: 187.38 LBS | BODY MASS INDEX: 36.79 KG/M2 | DIASTOLIC BLOOD PRESSURE: 92 MMHG | HEART RATE: 90 BPM | SYSTOLIC BLOOD PRESSURE: 137 MMHG | HEIGHT: 60 IN

## 2024-07-16 DIAGNOSIS — E66.9 OBESITY, UNSPECIFIED CLASSIFICATION, UNSPECIFIED OBESITY TYPE, UNSPECIFIED WHETHER SERIOUS COMORBIDITY PRESENT: ICD-10-CM

## 2024-07-16 DIAGNOSIS — C92.10 CML (CHRONIC MYELOCYTIC LEUKEMIA): Primary | ICD-10-CM

## 2024-07-16 PROCEDURE — 1159F MED LIST DOCD IN RCRD: CPT | Mod: CPTII,S$GLB,, | Performed by: PHYSICIAN ASSISTANT

## 2024-07-16 PROCEDURE — 3075F SYST BP GE 130 - 139MM HG: CPT | Mod: CPTII,S$GLB,, | Performed by: PHYSICIAN ASSISTANT

## 2024-07-16 PROCEDURE — 4010F ACE/ARB THERAPY RXD/TAKEN: CPT | Mod: CPTII,S$GLB,, | Performed by: PHYSICIAN ASSISTANT

## 2024-07-16 PROCEDURE — 1160F RVW MEDS BY RX/DR IN RCRD: CPT | Mod: CPTII,S$GLB,, | Performed by: PHYSICIAN ASSISTANT

## 2024-07-16 PROCEDURE — 3008F BODY MASS INDEX DOCD: CPT | Mod: CPTII,S$GLB,, | Performed by: PHYSICIAN ASSISTANT

## 2024-07-16 PROCEDURE — 99999 PR PBB SHADOW E&M-EST. PATIENT-LVL III: CPT | Mod: PBBFAC,,, | Performed by: PHYSICIAN ASSISTANT

## 2024-07-16 PROCEDURE — 3044F HG A1C LEVEL LT 7.0%: CPT | Mod: CPTII,S$GLB,, | Performed by: PHYSICIAN ASSISTANT

## 2024-07-16 PROCEDURE — 3080F DIAST BP >= 90 MM HG: CPT | Mod: CPTII,S$GLB,, | Performed by: PHYSICIAN ASSISTANT

## 2024-07-16 PROCEDURE — 99214 OFFICE O/P EST MOD 30 MIN: CPT | Mod: S$GLB,,, | Performed by: PHYSICIAN ASSISTANT

## 2024-07-16 RX ORDER — TIRZEPATIDE 2.5 MG/.5ML
2.5 INJECTION, SOLUTION SUBCUTANEOUS
Qty: 4 PEN | Refills: 3 | Status: SHIPPED | OUTPATIENT
Start: 2024-07-16

## 2024-07-16 RX ORDER — TIRZEPATIDE 2.5 MG/.5ML
2.5 INJECTION, SOLUTION SUBCUTANEOUS
Qty: 4 PEN | Refills: 3 | Status: SHIPPED | OUTPATIENT
Start: 2024-07-16 | End: 2024-07-16

## 2024-07-16 NOTE — PROGRESS NOTES
Subjective:      Audie Sandoval is a 48 y.o. female who presents to discuss weight loss. 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 Started seeing outside weight loss clinic in 2022. Took compounded semaglutide for about 5 months and lost 40lbs. Was feeling better but continued to have fatigue and nausea with the medication even at low doses every few weeks. She discontinued the medication in 11/2022. She was then diagnosed with CML in 2/2023. She has gained the weight back that she lost with semaglutide. Joint pain is worsening and interested in restarting a medication for weight loss. Cramping in her legs and taking mg at night. Knows that she is eating too much. She has met with nutrition at the cancer center previously.     Routine screening labs: 1/18/2024    Sleep: getting 6-7 hours per night, nap in the day     Stress: High    Exercise: No formal exercise. Having joint pain. Stretching daily.      A typical day consists of:  Breakfast: eggs, avocado, turkey sausage  Lunch: left over chicken sandwich or turkey wraps  Dinner: red beans and rice with dessert  Snack: sweet snack with coffee, junk food  Beverages: water, unsweetened iced tea, occasional coke or icee. Alcohol- 1 every 3 months      Lab Visit on 06/19/2024   Component Date Value Ref Range Status    Iron 06/19/2024 91  30 - 160 ug/dL Final    Transferrin 06/19/2024 270  200 - 375 mg/dL Final    TIBC 06/19/2024 400  250 - 450 ug/dL Final    Saturated Iron 06/19/2024 23  20 - 50 % Final    Ferritin 06/19/2024 24  20.0 - 300.0 ng/mL Final    Retic 06/19/2024 2.2  0.5 - 2.5 % Final   Lab Visit on 06/17/2024   Component Date Value Ref Range Status    WBC 06/17/2024 6.16  3.90 - 12.70 K/uL Final    RBC 06/17/2024 4.19  4.00 - 5.40 M/uL Final    Hemoglobin 06/17/2024 13.1  12.0 - 16.0 g/dL Final    Hematocrit 06/17/2024 40.7  37.0 - 48.5 % Final    MCV 06/17/2024 97  82 - 98 fL Final    MCH 06/17/2024 31.3 (H)  27.0 - 31.0 pg Final    MCHC  06/17/2024 32.2  32.0 - 36.0 g/dL Final    RDW 06/17/2024 13.7  11.5 - 14.5 % Final    Platelets 06/17/2024 210  150 - 450 K/uL Final    MPV 06/17/2024 10.8  9.2 - 12.9 fL Final    Immature Granulocytes 06/17/2024 0.3  0.0 - 0.5 % Final    Gran # (ANC) 06/17/2024 4.3  1.8 - 7.7 K/uL Final    Immature Grans (Abs) 06/17/2024 0.02  0.00 - 0.04 K/uL Final    Lymph # 06/17/2024 1.4  1.0 - 4.8 K/uL Final    Mono # 06/17/2024 0.3  0.3 - 1.0 K/uL Final    Eos # 06/17/2024 0.1  0.0 - 0.5 K/uL Final    Baso # 06/17/2024 0.02  0.00 - 0.20 K/uL Final    nRBC 06/17/2024 0  0 /100 WBC Final    Gran % 06/17/2024 69.6  38.0 - 73.0 % Final    Lymph % 06/17/2024 23.2  18.0 - 48.0 % Final    Mono % 06/17/2024 4.5  4.0 - 15.0 % Final    Eosinophil % 06/17/2024 2.1  0.0 - 8.0 % Final    Basophil % 06/17/2024 0.3  0.0 - 1.9 % Final    Differential Method 06/17/2024 Automated   Final    Sodium 06/17/2024 137  136 - 145 mmol/L Final    Potassium 06/17/2024 4.2  3.5 - 5.1 mmol/L Final    Chloride 06/17/2024 107  95 - 110 mmol/L Final    CO2 06/17/2024 23  23 - 29 mmol/L Final    Glucose 06/17/2024 83  70 - 110 mg/dL Final    BUN 06/17/2024 11  6 - 20 mg/dL Final    Creatinine 06/17/2024 0.7  0.5 - 1.4 mg/dL Final    Calcium 06/17/2024 8.7  8.7 - 10.5 mg/dL Final    Total Protein 06/17/2024 6.4  6.0 - 8.4 g/dL Final    Albumin 06/17/2024 3.7  3.5 - 5.2 g/dL Final    Total Bilirubin 06/17/2024 0.4  0.1 - 1.0 mg/dL Final    Alkaline Phosphatase 06/17/2024 64  55 - 135 U/L Final    AST 06/17/2024 16  10 - 40 U/L Final    ALT 06/17/2024 21  10 - 44 U/L Final    eGFR 06/17/2024 >60.0  >60 mL/min/1.73 m^2 Final    Anion Gap 06/17/2024 7 (L)  8 - 16 mmol/L Final    Phosphorus 06/17/2024 2.4 (L)  2.7 - 4.5 mg/dL Final    Magnesium 06/17/2024 2.0  1.6 - 2.6 mg/dL Final    Specimen Type, p210, Quant, bld 06/17/2024 blood   Corrected    Final Diagnosis, p210, Quant, bld 06/17/2024 SEE BELOW   Final    BCR/ABL,p210 Result 06/17/2024 see interpretation    Final    Hemoglobin A1C 2024 5.0  4.0 - 5.6 % Final    Estimated Avg Glucose 2024 97  68 - 131 mg/dL Final   Office Visit on 2024   Component Date Value Ref Range Status    Final Pathologic Diagnosis 2024    Final                    Value:Specimen Adequacy  Satisfactory for interpretation. Endocervical component is absent.    [place] Category  Negative for intraepithelial lesion or malignancy.  Menstrual smear.      Disclaimer 2024    Final                    Value:The Pap smear is a screening test that aids in the detection of cervical cancer and cancer precursors. Both false positive and false negative results can occur. The test should be used at regular intervals, and positive results should be confirmed before   definitive therapy.  This liquid based specimen is processed using the  or  Thin PrepPAP System. This specimen has been analyzed by the ThinPrep Imaging System ([employer]), an automated imaging and review system which assists the laboratory in evaluating   cells on ThinPrep PAP tests. Following automated imaging, selected fields from every slide are reviewed by a cytotechnologist and/or pathologist.     Screening was performed at [facility], [address] [place] [id number].      HPV other High Risk types, PCR 2024 Negative  Negative Final    HPV High Risk type 16, PCR 2024 Negative  Negative Final    HPV High Risk type 18, PCR 2024 Negative  Negative Final       Past Medical History:   Diagnosis Date    Allergy     Chronic myeloid leukemia 2023    Gestational hypertension      Past Surgical History:   Procedure Laterality Date     SECTION      TONSILLECTOMY       Social History     Tobacco Use    Smoking status: Never    Smokeless tobacco: Never   Substance Use Topics    Alcohol use: Yes     Comment: Rare    Drug use: Never     Family History   Problem Relation Name  Age of Onset    Abnormal EKG Father      Diabetes Father      Hypertension Mother      Arthritis Mother          DJD spine    Breast cancer Neg Hx      Ovarian cancer Neg Hx      Uterine cancer Neg Hx      Cervical cancer Neg Hx      Cancer Neg Hx      Colon cancer Neg Hx       OB History    Para Term  AB Living   2 2           SAB IAB Ectopic Multiple Live Births                  # Outcome Date GA Lbr Ward/2nd Weight Sex Type Anes PTL Lv   2 Para      CS-LTranv      1 Para      CS-LTranv          Current Outpatient Medications:     acyclovir (ZOVIRAX) 400 MG tablet, Take 400 mg by mouth 2 (two) times daily., Disp: , Rfl:     ALBUTEROL, REFILL, INHL, , Disp: , Rfl:     imatinib (GLEEVEC) 100 MG Tab, Take 2 tablets (200 mg total) by mouth once daily., Disp: 60 tablet, Rfl: 11    losartan (COZAAR) 25 MG tablet, Take 0.5 tablets (12.5 mg total) by mouth once daily., Disp: 30 tablet, Rfl: 11    ondansetron (ZOFRAN) 4 MG tablet, Take 1 tablet (4 mg total) by mouth every 6 (six) hours as needed for Nausea., Disp: 30 tablet, Rfl: 11    tirzepatide, weight loss, (ZEPBOUND) 2.5 mg/0.5 mL PnIj, Inject 2.5 mg into the skin every 7 days., Disp: 4 Pen, Rfl: 3    valACYclovir (VALTREX) 1000 MG tablet, Take 1 tablet (1,000 mg total) by mouth 2 (two) times daily., Disp: 30 tablet, Rfl: 6    Review of Systems:  General: No fever, chills, or weight loss.  Chest: No chest pain, shortness of breath, or palpitations.  Breast: No pain, masses, or nipple discharge.  Vulva: No pain, lesions, or itching.  Vagina: No relaxation, itching, discharge, or lesions.  Abdomen: No pain, nausea, vomiting, diarrhea, or constipation.  Urinary: No incontinence, nocturia, frequency, or dysuria.  Extremities:  No leg cramps, edema, or calf pain.  Neurologic: No headaches, dizziness, or visual changes.    Objective:     Vitals:    24 0833   BP: (!) 137/92   Pulse: 90   Weight: 85 kg (187 lb 6.4 oz)   Height: 5' (1.524 m)   PainSc: 0-No  pain     Body mass index is 36.6 kg/m².    PHYSICAL EXAM:  APPEARANCE: Well nourished, well developed, in no acute distress.  AFFECT: WNL, alert and oriented x 3  SKIN: No acne or hirsutism  CHEST: Good respiratory effect    Assessment:    CML (chronic myelocytic leukemia)    Obesity, unspecified classification, unspecified obesity type, unspecified whether serious comorbidity present  -     Discontinue: tirzepatide, weight loss, (ZEPBOUND) 2.5 mg/0.5 mL PnIj; Inject 2.5 mg into the skin every 7 days.  Dispense: 4 Pen; Refill: 3  -     tirzepatide, weight loss, (ZEPBOUND) 2.5 mg/0.5 mL PnIj; Inject 2.5 mg into the skin every 7 days.  Dispense: 4 Pen; Refill: 3        Plan:   Strongly encouraged anti-inflammatory diet  Scheduling to start therapeutic yoga   Developed meal plan with handout of preferred foods.  Encouraged lean protein with every meal. Avoid skipping meals  Wide variety of fruits and vegetables. Limit starch to 1/3 cup or 1 piece of bread per meal.  Start Zepbound 2.5mg SC qweekly. Counseled on use.  Reviewed side effects and risks of medications. No family or personal history of thyroid cancer or pancreatitis. Denies underlying gallbladder issues.  Discussed that these are long term options for weight loss and risk of gaining weight back if discontinued.    We did discuss compounded options if zepbound is not covered and cost prohibitive.  Reviewed the differences between Mounjaro/Zepbound and compounded tirzepatide-methylcobalamin and that these medications are not FDA regulated.  Follow up in 8 weeks.    Instructed patient to call if she experiences any side effects or has any questions.    I spent a total of 35 minutes on the day of the visit.This includes face to face time and non-face to face time preparing to see the patient (eg, review of tests), obtaining and/or reviewing separately obtained history, documenting clinical information in the electronic or other health record, independently  interpreting results and communicating results to the patient/family/caregiver, or care coordinator.

## 2024-07-23 ENCOUNTER — HOSPITAL ENCOUNTER (OUTPATIENT)
Dept: RADIOLOGY | Facility: HOSPITAL | Age: 49
Discharge: HOME | End: 2024-07-23
Attending: OBSTETRICS & GYNECOLOGY
Payer: COMMERCIAL

## 2024-07-23 VITALS — HEIGHT: 60 IN | BODY MASS INDEX: 36.71 KG/M2 | WEIGHT: 187 LBS

## 2024-07-23 DIAGNOSIS — Z12.31 ENCOUNTER FOR SCREENING MAMMOGRAM FOR MALIGNANT NEOPLASM OF BREAST: ICD-10-CM

## 2024-07-23 PROCEDURE — 77067 SCR MAMMO BI INCL CAD: CPT | Mod: 26,,, | Performed by: RADIOLOGY

## 2024-07-23 PROCEDURE — 77063 BREAST TOMOSYNTHESIS BI: CPT | Mod: TC

## 2024-07-23 PROCEDURE — 77067 SCR MAMMO BI INCL CAD: CPT | Mod: TC

## 2024-07-23 PROCEDURE — 77063 BREAST TOMOSYNTHESIS BI: CPT | Mod: 26,,, | Performed by: RADIOLOGY

## 2024-07-25 ENCOUNTER — INFUSION (OUTPATIENT)
Dept: INFUSION THERAPY | Facility: HOSPITAL | Age: 49
End: 2024-07-25
Payer: COMMERCIAL

## 2024-07-25 VITALS
DIASTOLIC BLOOD PRESSURE: 86 MMHG | RESPIRATION RATE: 18 BRPM | OXYGEN SATURATION: 98 % | HEART RATE: 91 BPM | SYSTOLIC BLOOD PRESSURE: 146 MMHG | TEMPERATURE: 98 F

## 2024-07-25 DIAGNOSIS — E61.1 IRON DEFICIENCY: Primary | ICD-10-CM

## 2024-07-25 PROCEDURE — 25000003 PHARM REV CODE 250

## 2024-07-25 PROCEDURE — 63600175 PHARM REV CODE 636 W HCPCS: Mod: JZ,JG

## 2024-07-25 PROCEDURE — 96365 THER/PROPH/DIAG IV INF INIT: CPT

## 2024-07-25 RX ORDER — SODIUM CHLORIDE 9 MG/ML
INJECTION, SOLUTION INTRAVENOUS CONTINUOUS
Status: CANCELLED | OUTPATIENT
Start: 2024-07-25

## 2024-07-25 RX ORDER — HEPARIN 100 UNIT/ML
5 SYRINGE INTRAVENOUS
Status: CANCELLED | OUTPATIENT
Start: 2024-07-25

## 2024-07-25 RX ORDER — EPINEPHRINE 0.3 MG/.3ML
0.3 INJECTION SUBCUTANEOUS ONCE AS NEEDED
Status: DISCONTINUED | OUTPATIENT
Start: 2024-07-25 | End: 2024-07-25 | Stop reason: HOSPADM

## 2024-07-25 RX ORDER — SODIUM CHLORIDE 0.9 % (FLUSH) 0.9 %
10 SYRINGE (ML) INJECTION
Status: DISCONTINUED | OUTPATIENT
Start: 2024-07-25 | End: 2024-07-25 | Stop reason: HOSPADM

## 2024-07-25 RX ORDER — DIPHENHYDRAMINE HYDROCHLORIDE 50 MG/ML
50 INJECTION INTRAMUSCULAR; INTRAVENOUS ONCE AS NEEDED
Status: CANCELLED | OUTPATIENT
Start: 2024-07-25

## 2024-07-25 RX ORDER — DIPHENHYDRAMINE HYDROCHLORIDE 50 MG/ML
50 INJECTION INTRAMUSCULAR; INTRAVENOUS ONCE AS NEEDED
Status: DISCONTINUED | OUTPATIENT
Start: 2024-07-25 | End: 2024-07-25 | Stop reason: HOSPADM

## 2024-07-25 RX ORDER — EPINEPHRINE 0.3 MG/.3ML
0.3 INJECTION SUBCUTANEOUS ONCE AS NEEDED
Status: CANCELLED | OUTPATIENT
Start: 2024-07-25

## 2024-07-25 RX ORDER — SODIUM CHLORIDE 0.9 % (FLUSH) 0.9 %
10 SYRINGE (ML) INJECTION
Status: CANCELLED | OUTPATIENT
Start: 2024-07-25

## 2024-07-25 RX ORDER — HEPARIN 100 UNIT/ML
5 SYRINGE INTRAVENOUS
Status: DISCONTINUED | OUTPATIENT
Start: 2024-07-25 | End: 2024-07-25 | Stop reason: HOSPADM

## 2024-07-25 RX ORDER — SODIUM CHLORIDE 9 MG/ML
INJECTION, SOLUTION INTRAVENOUS CONTINUOUS
Status: DISCONTINUED | OUTPATIENT
Start: 2024-07-25 | End: 2024-07-25 | Stop reason: HOSPADM

## 2024-07-25 RX ADMIN — FERRIC CARBOXYMALTOSE INJECTION 750 MG: 50 INJECTION, SOLUTION INTRAVENOUS at 02:07

## 2024-07-25 RX ADMIN — SODIUM CHLORIDE: 0.9 INJECTION, SOLUTION INTRAVENOUS at 02:07

## 2024-07-25 NOTE — PLAN OF CARE
Problem: Anemia  Goal: Anemia Symptom Improvement  Outcome: Progressing     Ambulatory to clinic with no c/o adverse effects or s/s of infection.  PIV inserted, flushed with out difficulty, blood return noted and infused with no problems.  Injecftafer tolerated with no problems.  30 min post OBS.  Ambulatory home.  NAD.     [facility] - Emergency Dept  Hospital Medicine  History & Physical    Patient Name: Erich Encarnacion  MRN: [id number]  Patient Class: OP- Observation  Admission Date: 7/1/2024  Attending Physician: Bárbara Hobbs MD   Primary Care Provider: Se Rios MD         Patient information was obtained from patient, relative(s), and ER records.     Subjective:     Principal Problem:Hypertensive urgency    Chief Complaint:   Chief Complaint   Patient presents with    Abdominal Pain     RLQ / RUQ tenderness    Constipation        HPI: 89 y.o. female with past medical history of high blood pressure, macular degeneration, headaches, reflux, hypothyroidism, TIA, and kidney stones who presents to ED via EMS with c/o RUQ and RLQ abdominal pain. Patient recently Dc'd from the facility on 6/28/24. Patient is independent lives alone. Family reports that patient had not had a BM Since Thursday of last week now 4 days. Daughter at the bedside reports that she is concerned about her mother's ability to swallow as she is spitting up. SPO2 98% on room air. The patient was with 214/100 BP in triage.  On evaluation of blood work patient with sodium 135, potassium 3.2, glucose 119, hematocrit 36.8, initial troponin 38.3 with a repeat of 53.6.  Patient's urine was extremely dark positive for nitrates however WBCs normal at a level of 4 patient given Rocephin 2 g in ED to prevent further urinary tract infection. Patient to be admitted for further evaluation.    Past Medical History:   Diagnosis Date    Macula lutea degeneration     Squamous cell carcinoma of skin        Past Surgical History:   Procedure Laterality Date    APPENDECTOMY      BACK SURGERY  1975    HYSTERECTOMY      URETEROSCOPIC REMOVAL OF URETERIC CALCULUS Left 5/28/2024    Procedure: REMOVAL, CALCULUS, URETER, URETEROSCOPIC;  Surgeon: Juhi Mosher Jr., MD;  Location: [facility];  Service: Urology;  Laterality: Left;       Review of patient's allergies  indicates:  No Known Allergies    Current Facility-Administered Medications on File Prior to Encounter   Medication    triamcinolone acetonide injection 40 mg     Current Outpatient Medications on File Prior to Encounter   Medication Sig    aspirin (ECOTRIN) 81 MG EC tablet Take 162 mg by mouth once daily.    isosorbide dinitrate (ISORDIL) 30 MG Tab Take 30 mg by mouth once daily.     topiramate (TOPAMAX) 25 MG tablet Take 25 mg by mouth once daily.     zolpidem (AMBIEN) 10 mg Tab Take 1 tablet (10 mg total) by mouth nightly as needed (insomnia).    acetaminophen (TYLENOL) 325 MG tablet Take 0.5 tablets by mouth every 6 (six) hours as needed for Pain.    HYDROcodone-acetaminophen (NORCO) 5-325 mg per tablet Take 1 tablet by mouth every 6 (six) hours as needed for Pain. (Patient taking differently: Take 1 tablet by mouth every 6 (six) hours as needed for Pain. NEW Rx - NOT YET STARTED)    levothyroxine (SYNTHROID) 25 MCG tablet Take 25 mcg by mouth every morning.     meclizine (ANTIVERT) 12.5 mg tablet Take 2 tablets by mouth Daily.    pantoprazole (PROTONIX) 40 MG tablet Take 1 tablet by mouth daily as needed (Heartburn).    polyethylene glycol (GLYCOLAX) 17 gram/dose powder Take 17 g by mouth every evening.    [DISCONTINUED] amLODIPine (NORVASC) 2.5 MG tablet Take 2.5 mg by mouth once daily.     Family History    None       Tobacco Use    Smoking status: Former     Current packs/day: 1.00     Average packs/day: 1 pack/day for 20.0 years (20.0 ttl pk-yrs)     Types: Cigarettes    Smokeless tobacco: Never   Substance and Sexual Activity    Alcohol use: No    Drug use: No    Sexual activity: Not on file     Review of Systems   HENT:  Positive for trouble swallowing.    Gastrointestinal:  Positive for abdominal pain.   Psychiatric/Behavioral:  Positive for confusion (at times per daughter).      Objective:     Vital Signs (Most Recent):  Temp: 97.8 °F (36.6 °C) (07/01/24 0657)  Pulse: 91 (07/01/24 1307)  Resp: (!) 23  (07/01/24 1307)  BP: (!) 203/98 (07/01/24 1229)  SpO2: (!) 89 % (07/01/24 1259) Vital Signs (24h Range):  Temp:  [97.8 °F (36.6 °C)-98 °F (36.7 °C)] 97.8 °F (36.6 °C)  Pulse:  [] 91  Resp:  [16-32] 23  SpO2:  [87 %-98 %] 89 %  BP: (155-216)/() 203/98     Weight: 70.3 kg (155 lb)  Body mass index is 26.61 kg/m².     Physical Exam  Vitals reviewed.   Constitutional:       General: She is not in acute distress.     Appearance: Normal appearance. She is not toxic-appearing.   HENT:      Head: Normocephalic and atraumatic.      Mouth/Throat:      Mouth: Mucous membranes are moist.      Pharynx: Oropharynx is clear.   Eyes:      Extraocular Movements: Extraocular movements intact.      Pupils: Pupils are equal, round, and reactive to light.   Cardiovascular:      Rate and Rhythm: Normal rate and regular rhythm.      Pulses: Normal pulses.      Heart sounds: Normal heart sounds.   Pulmonary:      Effort: Pulmonary effort is normal.      Breath sounds: Normal breath sounds.   Abdominal:      General: Bowel sounds are normal. There is no distension.      Palpations: Abdomen is soft.      Tenderness: There is abdominal tenderness (RLQ).   Genitourinary:     Comments: Dark colored urine + for Nitrites  Musculoskeletal:         General: No swelling. Normal range of motion.      Cervical back: Normal range of motion and neck supple.   Skin:     General: Skin is warm and dry.      Capillary Refill: Capillary refill takes less than 2 seconds.   Neurological:      General: No focal deficit present.      Mental Status: She is alert and oriented to person, place, and time. Mental status is at baseline.   Psychiatric:         Mood and Affect: Mood normal.         Behavior: Behavior normal.         Judgment: Judgment normal.              CRANIAL NERVES     CN III, IV, VI   Pupils are equal, round, and reactive to light.       Significant Labs: All pertinent labs within the past 24 hours have been reviewed.  Recent Lab  Results         07/01/24  1054   07/01/24  0848   07/01/24  0624   07/01/24  0623        Albumin     3.8         ALP     78         ALT     8         Anion Gap     9         Appearance, UA   Clear           AST     17         Bacteria, UA   Rare           Baso #     0.05         Basophil %     0.7         Bilirubin (UA)   Negative           BILIRUBIN TOTAL     0.8  Comment: For infants and newborns, interpretation of results should be based  on gestational age, weight and in agreement with clinical  observations.    Premature Infant recommended reference ranges:  Up to 24 hours.............<8.0 mg/dL  Up to 48 hours............<12.0 mg/dL  3-5 days..................<15.0 mg/dL  6-29 days.................<15.0 mg/dL           BNP     153  Comment: Values of less than 100 pg/ml are consistent with non-CHF populations.         BUN     21         Calcium     9.0         Chloride     103         CO2     23         Color, UA   Colorless           Creatinine     0.9         Differential Method     Automated         eGFR     58.1         Eos #     0.1         Eos %     1.2         Glucose     119         Glucose, UA   Negative           Gran # (ANC)     5.6         Gran %     73.9         Hematocrit     36.8         Hemoglobin     12.3         Immature Grans (Abs)     0.03  Comment: Mild elevation in immature granulocytes is non specific and   can be seen in a variety of conditions including stress response,   acute inflammation, trauma and pregnancy. Correlation with other   laboratory and clinical findings is essential.           Immature Granulocytes     0.4         Ketones, UA   1+           Lactic Acid Level     0.9  Comment: Falsely low lactic acid results can be found in samples   containing >=13.0 mg/dL total bilirubin and/or >=3.5 mg/dL   direct bilirubin.           Leukocyte Esterase, UA   Negative           Lipase     5         Lymph #     1.3         Lymph %     17.4         MCH     29.9         MCHC     33.4          MCV     90         Microscopic Comment   SEE COMMENT  Comment: Other formed elements not mentioned in the report are not   present in the microscopic examination.              Mono #     0.5         Mono %     6.4         MPV     11.3         NITRITE UA   Positive           nRBC     0         Blood, UA   Negative           pH, UA   8.0           Platelet Count     159         POC Creatinine       0.9       Potassium     3.2         PROTEIN TOTAL     6.5         Protein, UA   Negative  Comment: Recommend a 24 hour urine protein or a urine   protein/creatinine ratio if globulin induced proteinuria is  clinically suspected.             RBC     4.11         RBC, UA   5           RDW     13.6         Sample       VENOUS       Sodium     135         Spec Grav UA   1.020           Specimen UA   Urine, Clean Catch           Squam Epithel, UA   1           Troponin I High Sensitivity 53.6  Comment: Troponin results differ between methods. Do not use   results between Troponin methods interchangeably.    Alkaline Phospatase levels above 400 U/L may   cause false positive results.    Access hsTnI should not be used for patients taking   Asfotase raul (Strensiq).  Critical result TNIHS 53.6 pg/mL called to and read back by Dr. Bashir/ed  at 01-Jul-2024 12:20 [facility].  Result Rechecked       38.3  Comment: Troponin results differ between methods. Do not use   results between Troponin methods interchangeably.    Alkaline Phospatase levels above 400 U/L may   cause false positive results.    Access hsTnI should not be used for patients taking   Asfotase raul (Strensiq).           UROBILINOGEN UA   Negative           WBC, UA   4           WBC     7.53                 Significant Imaging: I have reviewed all pertinent imaging results/findings within the past 24 hours.    X-Ray Chest 1 View  Narrative: CLINICAL HISTORY:  ([id number])89 y/o  (10/2/1924) F    Other malaise    TECHNIQUE:  (A#[id number], exam time 7/1/2024  9:59)    XR CHEST 1 VIEW [id number]    COMPARISON:  Radiographs from 06/28/2020    FINDINGS:  The lungs are clear except for some vascular crowding bilaterally  likely related to a suboptimal inspiration. No pneumothorax is identified. The heart is top normal in size. Atheromatous calcifications are seen at the aortic arch. Osseous structures appear unchanged. The visualized upper abdomen is unremarkable.  Impression: No acute cardiac or pulmonary process.    Electronically signed by: Ab Lam  Date:    07/01/2024  Time:    10:01  CT Abdomen Pelvis With IV Contrast NO Oral Contrast  Narrative: CMS MANDATED QUALITY DATA - CT RADIATION - 436    All CT scans at this facility utilize dose modulation, iterative reconstruction, and/or weight based dosing when appropriate to reduce radiation dose to as low as reasonably achievable.    EXAMINATION:  CT ABDOMEN PELVIS WITH IV CONTRAST    CLINICAL HISTORY:  Abdominal pain, acute, nonlocalized;Bowel obstruction suspected;Nausea/vomiting;    TECHNIQUE:  CT abdomen and pelvis with IV contrast.  100 mL Omnipaque 350.    COMPARISON:  CT abdomen pelvis 04/16/2024    FINDINGS:  Bilateral trace pleural effusions noted.  There is subsegmental atelectasis of the lung bases.  Heart size is within normal limits.  Calcifications of the mitral valve annulus noted.    Liver is normal size.  There are no hepatic lesions.  The gallbladder is and common bile duct within normal limits.  The pancreas, spleen and adrenal glands are unremarkable.  The kidneys are normal size.  Small bilateral renal cysts are noted.  No enhancing renal lesions identified.  There is no hydronephrosis or nephrolithiasis.  There is mild prominence of the left ureter.  No ureteral obstruction is observed.  The bladder is fluid filled with no focal wall abnormalities.  Uterus and adnexal structures have been removed.  No free fluid in the pelvis.  Multiple pelvic phleboliths noted.    There is diverticulosis of the  colon.  There is no bowel wall thickening or inflammatory changes.  Appendix is not definitively identified.  Stomach is mostly decompressed.  There is a small hiatal hernia.    The abdominal aorta is normal caliber with calcified plaque formation.  No enlarged intra-abdominal lymph nodes.  No mesenteric fat stranding or free fluid.  Ventral abdominal wall is unremarkable.  There are degenerative changes of the spine.  There is no acute osseous abnormality.  Impression: 1. Diverticulosis of the colon.  2. Mild prominence of the left ureter with no ureteral obstruction observed.  No nephrolithiasis observed bilaterally.  3. Bilateral trace pleural effusions and subsegmental atelectasis of the lung bases.  4. Small hiatal hernia.    Electronically signed by: Zachariah Morton  Date:    07/01/2024  Time:    08:21     Assessment/Plan:     * Hypertensive urgency  Patient has a current diagnosis of hypertensive urgency (without evidence of end organ damage) which is controlled.  Latest blood pressure and vitals reviewed-   Temp:  [97.8 °F (36.6 °C)-98 °F (36.7 °C)]   Pulse:  []   Resp:  [16-32]   BP: (155-216)/()   SpO2:  [87 %-98 %] .   Patient currently off IV antihypertensives.   Home meds for hypertension were reviewed and noted below.   Hypertension Medications               isosorbide dinitrate (ISORDIL) 30 MG Tab Take 30 mg by mouth once daily.             Medication adjustment for hospital antihypertensives is as follows- add hydralazine PRN SBP > 180 q8hrs    Will aim for controlled BP reduction by medications noted above. Monitor and mitigate end organ damage as indicated.    Demand ischemia  - Initial troponin 38.3 with repeat  of 53.6 repeat at 1600  - Trend  - Tight BP control    Chronic idiopathic constipation  - Patient with RUQ/RLQ pain  - CT scan w Abdomen with diverticulosis.  Mild prominence of left ureter no ureteral obstruction.  No nephrolithiasis.  Bilateral trace pleural effusions and  subsegmental   atelectasis at the lung bases.  Small hiatal hernia  - Digital Exam in ED with no stool in rectal vault  - Enema in the ED with brown liquid stool  - Daughter reports that her mother has not eaten much over the last few days  - Miralax Daily      UTI (urinary tract infection)  - Dark urine with WBC 4, +Nitrites given 2gm rocephin in ED      Advanced age  - PT/OT to assess and treat        VTE Risk Mitigation (From admission, onward)           Ordered     enoxaparin injection 40 mg  Daily         07/01/24 1300     IP VTE HIGH RISK PATIENT  Once         07/01/24 1300     Place sequential compression device  Until discontinued         07/01/24 1300                         On 07/01/2024, patient should be placed in hospital observation services under my care in collaboration with Faby.           Kyara Liu NP  Department of Hospital Medicine  [facility] - Emergency Dept

## 2024-07-26 RX ORDER — MUPIROCIN 20 MG/G
OINTMENT TOPICAL
Status: DISPENSED
Start: 2024-07-26 | End: 2024-07-26

## 2024-08-02 ENCOUNTER — PATIENT MESSAGE (OUTPATIENT)
Dept: OBSTETRICS AND GYNECOLOGY | Facility: CLINIC | Age: 49
End: 2024-08-02
Payer: COMMERCIAL

## 2024-08-13 ENCOUNTER — PATIENT MESSAGE (OUTPATIENT)
Dept: OBSTETRICS AND GYNECOLOGY | Facility: CLINIC | Age: 49
End: 2024-08-13
Payer: COMMERCIAL

## 2024-08-16 ENCOUNTER — OFFICE VISIT (OUTPATIENT)
Dept: INTERNAL MEDICINE | Facility: CLINIC | Age: 49
End: 2024-08-16
Payer: COMMERCIAL

## 2024-08-16 VITALS
BODY MASS INDEX: 36.44 KG/M2 | HEIGHT: 60 IN | SYSTOLIC BLOOD PRESSURE: 130 MMHG | DIASTOLIC BLOOD PRESSURE: 85 MMHG | TEMPERATURE: 98 F | RESPIRATION RATE: 17 BRPM | HEART RATE: 80 BPM | OXYGEN SATURATION: 98 % | WEIGHT: 185.63 LBS

## 2024-08-16 DIAGNOSIS — L72.3 SEBACEOUS CYST OF RIGHT AXILLA: Primary | ICD-10-CM

## 2024-08-16 PROCEDURE — 99999 PR PBB SHADOW E&M-EST. PATIENT-LVL IV: CPT | Mod: PBBFAC,,, | Performed by: FAMILY MEDICINE

## 2024-08-16 RX ORDER — SULFAMETHOXAZOLE AND TRIMETHOPRIM 800; 160 MG/1; MG/1
1 TABLET ORAL 2 TIMES DAILY
Qty: 20 TABLET | Refills: 0 | Status: SHIPPED | OUTPATIENT
Start: 2024-08-16 | End: 2024-08-26

## 2024-08-16 RX ORDER — MUPIROCIN 20 MG/G
OINTMENT TOPICAL 2 TIMES DAILY
Qty: 22 G | Refills: 0 | Status: SHIPPED | OUTPATIENT
Start: 2024-08-16

## 2024-08-16 NOTE — PROGRESS NOTES
Subjective     Patient ID: Audie Sandoval is a 48 y.o. female.    Chief Complaint: Cyst (Under right arm)  48-year-old female presents to clinic today secondary to concerns of a cyst to the right axilla.  She reports a similar cyst to the right axilla many years ago that was incised and drained by surgery.  She has noted the cyst for many months but over the past week the cyst has grown in size and has become inflamed and tender.  She has been using cool compresses and tea tree oil without relief.  She is scheduled to see a surgeon on Monday; however, she is concerned for infection at this time.  Cyst  Pertinent negatives include no abdominal pain, chest pain, chills, congestion, coughing, fatigue, fever, headaches, myalgias, nausea, neck pain, rash, sore throat or vomiting.     Review of Systems   Constitutional:  Negative for appetite change, chills, fatigue and fever.   HENT:  Negative for nasal congestion, ear pain, hearing loss, postnasal drip, rhinorrhea, sinus pressure/congestion, sore throat and tinnitus.    Eyes:  Negative for redness, itching and visual disturbance.   Respiratory:  Negative for cough, chest tightness and shortness of breath.    Cardiovascular:  Negative for chest pain and palpitations.   Gastrointestinal:  Negative for abdominal pain, constipation, diarrhea, nausea and vomiting.   Genitourinary:  Negative for decreased urine volume, difficulty urinating, dysuria, frequency, hematuria and urgency.   Musculoskeletal:  Negative for back pain, myalgias, neck pain and neck stiffness.   Integumentary:  Positive for mole/lesion (Cyst to right axilla). Negative for rash.   Neurological:  Negative for dizziness, light-headedness and headaches.   Psychiatric/Behavioral: Negative.            Objective     Physical Exam  Constitutional:       Appearance: Normal appearance.   HENT:      Head: Normocephalic and atraumatic.   Pulmonary:      Effort: Pulmonary effort is normal.   Chest:        Neurological:      Mental Status: She is alert and oriented to person, place, and time.   Psychiatric:         Mood and Affect: Mood normal.         Behavior: Behavior normal.         Thought Content: Thought content normal.         Judgment: Judgment normal.            Assessment and Plan     1. Sebaceous cyst of right axilla  -     mupirocin (BACTROBAN) 2 % ointment; Apply topically 2 (two) times daily.  Dispense: 22 g; Refill: 0  -     sulfamethoxazole-trimethoprim 800-160mg (BACTRIM DS) 800-160 mg Tab; Take 1 tablet by mouth 2 (two) times daily. for 10 days  Dispense: 20 tablet; Refill: 0        1. Bactroban ointment 1 application to the affected area twice daily.    2. Bactrim ds 1 tablet p.o. b.i.d. for 10 days.  3. Warm compresses as needed for pain.    4. Return to clinic as needed if symptoms persist or worsen.               Follow up if symptoms worsen or fail to improve.

## 2024-08-19 ENCOUNTER — OFFICE VISIT (OUTPATIENT)
Dept: SURGERY | Facility: CLINIC | Age: 49
End: 2024-08-19
Attending: SPECIALIST
Payer: COMMERCIAL

## 2024-08-19 ENCOUNTER — PATIENT MESSAGE (OUTPATIENT)
Dept: SURGERY | Facility: CLINIC | Age: 49
End: 2024-08-19

## 2024-08-19 VITALS
BODY MASS INDEX: 36.32 KG/M2 | OXYGEN SATURATION: 98 % | WEIGHT: 185 LBS | HEIGHT: 60 IN | HEART RATE: 81 BPM | DIASTOLIC BLOOD PRESSURE: 70 MMHG | SYSTOLIC BLOOD PRESSURE: 129 MMHG

## 2024-08-19 DIAGNOSIS — L02.419 AXILLARY ABSCESS: Primary | ICD-10-CM

## 2024-08-19 PROCEDURE — 99999 PR PBB SHADOW E&M-EST. PATIENT-LVL III: CPT | Mod: PBBFAC,,, | Performed by: SPECIALIST

## 2024-08-19 NOTE — PROCEDURES
"Incision & Drainage Right axillary abscess    Date/Time: 8/19/2024 1:30 PM    Performed by: Saad Schuster MD  Authorized by: Saad Schuster MD    Time out: Immediately prior to procedure a "time out" was called to verify the correct patient, procedure, equipment, support staff and site/side marked as required.    Consent Done?:  Yes (Verbal)    Type:  Abscess  Body area:  Trunk (Right Axillary Abscess)  Anesthesia:  Local infiltration  Local anesthetic: Lidocaine 1% with epinephrine  Anesthetic total (ml):  10  Scalpel size:  11  Incision type:  Single straight  Incision depth: subcutaneous    Complexity:  Simple  Drainage:  Serosanguinous, pus and purulent  Drainage amount:  Moderate  Wound treatment:  Incision, wound left open, drainage and expression of material  Packing material: Covered with gauze.  Patient tolerance:  Patient tolerated the procedure well with no immediate complications    3 cm sebaceous cyst. I and D. Local wound care. RTC 6-8 weeks.     "

## 2024-08-20 ENCOUNTER — PATIENT MESSAGE (OUTPATIENT)
Dept: HEMATOLOGY/ONCOLOGY | Facility: CLINIC | Age: 49
End: 2024-08-20
Payer: COMMERCIAL

## 2024-08-29 ENCOUNTER — PATIENT MESSAGE (OUTPATIENT)
Dept: SURGERY | Facility: CLINIC | Age: 49
End: 2024-08-29
Payer: COMMERCIAL

## 2024-09-13 ENCOUNTER — LAB VISIT (OUTPATIENT)
Dept: LAB | Facility: HOSPITAL | Age: 49
End: 2024-09-13
Attending: INTERNAL MEDICINE
Payer: COMMERCIAL

## 2024-09-13 ENCOUNTER — PATIENT MESSAGE (OUTPATIENT)
Dept: HEMATOLOGY/ONCOLOGY | Facility: CLINIC | Age: 49
End: 2024-09-13
Payer: COMMERCIAL

## 2024-09-13 DIAGNOSIS — C92.10 CML (CHRONIC MYELOCYTIC LEUKEMIA): ICD-10-CM

## 2024-09-13 LAB
ALBUMIN SERPL BCP-MCNC: 4.2 G/DL (ref 3.5–5.2)
ALP SERPL-CCNC: 67 U/L (ref 55–135)
ALT SERPL W/O P-5'-P-CCNC: 49 U/L (ref 10–44)
ANION GAP SERPL CALC-SCNC: 5 MMOL/L (ref 8–16)
AST SERPL-CCNC: 26 U/L (ref 10–40)
BASOPHILS # BLD AUTO: 0.02 K/UL (ref 0–0.2)
BASOPHILS NFR BLD: 0.3 % (ref 0–1.9)
BILIRUB SERPL-MCNC: 0.5 MG/DL (ref 0.1–1)
BUN SERPL-MCNC: 11 MG/DL (ref 6–20)
CALCIUM SERPL-MCNC: 10.2 MG/DL (ref 8.7–10.5)
CHLORIDE SERPL-SCNC: 107 MMOL/L (ref 95–110)
CO2 SERPL-SCNC: 26 MMOL/L (ref 23–29)
CREAT SERPL-MCNC: 0.9 MG/DL (ref 0.5–1.4)
DIFFERENTIAL METHOD BLD: ABNORMAL
EOSINOPHIL # BLD AUTO: 0.1 K/UL (ref 0–0.5)
EOSINOPHIL NFR BLD: 1.7 % (ref 0–8)
ERYTHROCYTE [DISTWIDTH] IN BLOOD BY AUTOMATED COUNT: 13 % (ref 11.5–14.5)
EST. GFR  (NO RACE VARIABLE): >60 ML/MIN/1.73 M^2
GLUCOSE SERPL-MCNC: 85 MG/DL (ref 70–110)
HCT VFR BLD AUTO: 42.9 % (ref 37–48.5)
HGB BLD-MCNC: 14.3 G/DL (ref 12–16)
IMM GRANULOCYTES # BLD AUTO: 0.01 K/UL (ref 0–0.04)
IMM GRANULOCYTES NFR BLD AUTO: 0.2 % (ref 0–0.5)
LYMPHOCYTES # BLD AUTO: 1.8 K/UL (ref 1–4.8)
LYMPHOCYTES NFR BLD: 28 % (ref 18–48)
MAGNESIUM SERPL-MCNC: 1.9 MG/DL (ref 1.6–2.6)
MCH RBC QN AUTO: 31.5 PG (ref 27–31)
MCHC RBC AUTO-ENTMCNC: 33.3 G/DL (ref 32–36)
MCV RBC AUTO: 95 FL (ref 82–98)
MONOCYTES # BLD AUTO: 0.3 K/UL (ref 0.3–1)
MONOCYTES NFR BLD: 4 % (ref 4–15)
NEUTROPHILS # BLD AUTO: 4.2 K/UL (ref 1.8–7.7)
NEUTROPHILS NFR BLD: 65.8 % (ref 38–73)
NRBC BLD-RTO: 0 /100 WBC
PHOSPHATE SERPL-MCNC: 2.8 MG/DL (ref 2.7–4.5)
PLATELET # BLD AUTO: 192 K/UL (ref 150–450)
PMV BLD AUTO: 11.2 FL (ref 9.2–12.9)
POTASSIUM SERPL-SCNC: 4.1 MMOL/L (ref 3.5–5.1)
PROT SERPL-MCNC: 7 G/DL (ref 6–8.4)
RBC # BLD AUTO: 4.54 M/UL (ref 4–5.4)
SODIUM SERPL-SCNC: 138 MMOL/L (ref 136–145)
WBC # BLD AUTO: 6.32 K/UL (ref 3.9–12.7)

## 2024-09-13 PROCEDURE — 80053 COMPREHEN METABOLIC PANEL: CPT | Performed by: INTERNAL MEDICINE

## 2024-09-13 PROCEDURE — 36415 COLL VENOUS BLD VENIPUNCTURE: CPT | Performed by: INTERNAL MEDICINE

## 2024-09-13 PROCEDURE — 84100 ASSAY OF PHOSPHORUS: CPT | Performed by: INTERNAL MEDICINE

## 2024-09-13 PROCEDURE — 81206 BCR/ABL1 GENE MAJOR BP: CPT | Performed by: INTERNAL MEDICINE

## 2024-09-13 PROCEDURE — 85025 COMPLETE CBC W/AUTO DIFF WBC: CPT | Performed by: INTERNAL MEDICINE

## 2024-09-13 PROCEDURE — 83735 ASSAY OF MAGNESIUM: CPT | Performed by: INTERNAL MEDICINE

## 2024-09-16 ENCOUNTER — OFFICE VISIT (OUTPATIENT)
Dept: OBSTETRICS AND GYNECOLOGY | Facility: CLINIC | Age: 49
End: 2024-09-16
Payer: COMMERCIAL

## 2024-09-16 VITALS
HEIGHT: 60 IN | HEART RATE: 80 BPM | DIASTOLIC BLOOD PRESSURE: 90 MMHG | WEIGHT: 182.81 LBS | SYSTOLIC BLOOD PRESSURE: 138 MMHG | BODY MASS INDEX: 35.89 KG/M2

## 2024-09-16 DIAGNOSIS — E66.9 OBESITY, UNSPECIFIED CLASSIFICATION, UNSPECIFIED OBESITY TYPE, UNSPECIFIED WHETHER SERIOUS COMORBIDITY PRESENT: ICD-10-CM

## 2024-09-16 DIAGNOSIS — C92.10 CML (CHRONIC MYELOCYTIC LEUKEMIA): Primary | ICD-10-CM

## 2024-09-16 PROCEDURE — 1160F RVW MEDS BY RX/DR IN RCRD: CPT | Mod: CPTII,S$GLB,, | Performed by: PHYSICIAN ASSISTANT

## 2024-09-16 PROCEDURE — 3075F SYST BP GE 130 - 139MM HG: CPT | Mod: CPTII,S$GLB,, | Performed by: PHYSICIAN ASSISTANT

## 2024-09-16 PROCEDURE — 4010F ACE/ARB THERAPY RXD/TAKEN: CPT | Mod: CPTII,S$GLB,, | Performed by: PHYSICIAN ASSISTANT

## 2024-09-16 PROCEDURE — 99999 PR PBB SHADOW E&M-EST. PATIENT-LVL III: CPT | Mod: PBBFAC,,, | Performed by: PHYSICIAN ASSISTANT

## 2024-09-16 PROCEDURE — 3080F DIAST BP >= 90 MM HG: CPT | Mod: CPTII,S$GLB,, | Performed by: PHYSICIAN ASSISTANT

## 2024-09-16 PROCEDURE — 99213 OFFICE O/P EST LOW 20 MIN: CPT | Mod: S$GLB,,, | Performed by: PHYSICIAN ASSISTANT

## 2024-09-16 PROCEDURE — 3008F BODY MASS INDEX DOCD: CPT | Mod: CPTII,S$GLB,, | Performed by: PHYSICIAN ASSISTANT

## 2024-09-16 PROCEDURE — 3044F HG A1C LEVEL LT 7.0%: CPT | Mod: CPTII,S$GLB,, | Performed by: PHYSICIAN ASSISTANT

## 2024-09-16 PROCEDURE — 1159F MED LIST DOCD IN RCRD: CPT | Mod: CPTII,S$GLB,, | Performed by: PHYSICIAN ASSISTANT

## 2024-09-16 NOTE — PROGRESS NOTES
Subjective:      Audie Sandoval is a 48 y.o. 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 She is taking compounded tirzepatide 2.5mg SC weekly. She has lost about 5lbs since last seen.  Tolerating well. Some nausea but better then with semaglutide. Denies skipping meals. No specific weight loss goal. Wants more energy with weight loss.   Recent URI and treated with z-robert. ALT increased slight. Oncology not worried but makes her nervous. No other changes.    Lab Visit on 09/13/2024   Component Date Value Ref Range Status    WBC 09/13/2024 6.32  3.90 - 12.70 K/uL Final    RBC 09/13/2024 4.54  4.00 - 5.40 M/uL Final    Hemoglobin 09/13/2024 14.3  12.0 - 16.0 g/dL Final    Hematocrit 09/13/2024 42.9  37.0 - 48.5 % Final    MCV 09/13/2024 95  82 - 98 fL Final    MCH 09/13/2024 31.5 (H)  27.0 - 31.0 pg Final    MCHC 09/13/2024 33.3  32.0 - 36.0 g/dL Final    RDW 09/13/2024 13.0  11.5 - 14.5 % Final    Platelets 09/13/2024 192  150 - 450 K/uL Final    MPV 09/13/2024 11.2  9.2 - 12.9 fL Final    Immature Granulocytes 09/13/2024 0.2  0.0 - 0.5 % Final    Gran # (ANC) 09/13/2024 4.2  1.8 - 7.7 K/uL Final    Immature Grans (Abs) 09/13/2024 0.01  0.00 - 0.04 K/uL Final    Lymph # 09/13/2024 1.8  1.0 - 4.8 K/uL Final    Mono # 09/13/2024 0.3  0.3 - 1.0 K/uL Final    Eos # 09/13/2024 0.1  0.0 - 0.5 K/uL Final    Baso # 09/13/2024 0.02  0.00 - 0.20 K/uL Final    nRBC 09/13/2024 0  0 /100 WBC Final    Gran % 09/13/2024 65.8  38.0 - 73.0 % Final    Lymph % 09/13/2024 28.0  18.0 - 48.0 % Final    Mono % 09/13/2024 4.0  4.0 - 15.0 % Final    Eosinophil % 09/13/2024 1.7  0.0 - 8.0 % Final    Basophil % 09/13/2024 0.3  0.0 - 1.9 % Final    Differential Method 09/13/2024 Automated   Final    Sodium 09/13/2024 138  136 - 145 mmol/L Final    Potassium 09/13/2024 4.1  3.5 - 5.1 mmol/L Final    Chloride 09/13/2024 107  95 - 110 mmol/L Final    CO2 09/13/2024 26  23 - 29 mmol/L Final     Glucose 09/13/2024 85  70 - 110 mg/dL Final    BUN 09/13/2024 11  6 - 20 mg/dL Final    Creatinine 09/13/2024 0.9  0.5 - 1.4 mg/dL Final    Calcium 09/13/2024 10.2  8.7 - 10.5 mg/dL Final    Total Protein 09/13/2024 7.0  6.0 - 8.4 g/dL Final    Albumin 09/13/2024 4.2  3.5 - 5.2 g/dL Final    Total Bilirubin 09/13/2024 0.5  0.1 - 1.0 mg/dL Final    Alkaline Phosphatase 09/13/2024 67  55 - 135 U/L Final    AST 09/13/2024 26  10 - 40 U/L Final    ALT 09/13/2024 49 (H)  10 - 44 U/L Final    eGFR 09/13/2024 >60.0  >60 mL/min/1.73 m^2 Final    Anion Gap 09/13/2024 5 (L)  8 - 16 mmol/L Final    Phosphorus 09/13/2024 2.8  2.7 - 4.5 mg/dL Final    Magnesium 09/13/2024 1.9  1.6 - 2.6 mg/dL Final    Specimen Type, p210, Quant, bld 09/13/2024 Blood   Final   Lab Visit on 06/19/2024   Component Date Value Ref Range Status    Iron 06/19/2024 91  30 - 160 ug/dL Final    Transferrin 06/19/2024 270  200 - 375 mg/dL Final    TIBC 06/19/2024 400  250 - 450 ug/dL Final    Saturated Iron 06/19/2024 23  20 - 50 % Final    Ferritin 06/19/2024 24  20.0 - 300.0 ng/mL Final    Retic 06/19/2024 2.2  0.5 - 2.5 % Final   Lab Visit on 06/17/2024   Component Date Value Ref Range Status    WBC 06/17/2024 6.16  3.90 - 12.70 K/uL Final    RBC 06/17/2024 4.19  4.00 - 5.40 M/uL Final    Hemoglobin 06/17/2024 13.1  12.0 - 16.0 g/dL Final    Hematocrit 06/17/2024 40.7  37.0 - 48.5 % Final    MCV 06/17/2024 97  82 - 98 fL Final    MCH 06/17/2024 31.3 (H)  27.0 - 31.0 pg Final    MCHC 06/17/2024 32.2  32.0 - 36.0 g/dL Final    RDW 06/17/2024 13.7  11.5 - 14.5 % Final    Platelets 06/17/2024 210  150 - 450 K/uL Final    MPV 06/17/2024 10.8  9.2 - 12.9 fL Final    Immature Granulocytes 06/17/2024 0.3  0.0 - 0.5 % Final    Gran # (ANC) 06/17/2024 4.3  1.8 - 7.7 K/uL Final    Immature Grans (Abs) 06/17/2024 0.02  0.00 - 0.04 K/uL Final    Lymph # 06/17/2024 1.4  1.0 - 4.8 K/uL Final    Mono # 06/17/2024 0.3  0.3 - 1.0 K/uL Final    Eos # 06/17/2024 0.1   0.0 - 0.5 K/uL Final    Baso # 2024 0.02  0.00 - 0.20 K/uL Final    nRBC 2024 0  0 /100 WBC Final    Gran % 2024 69.6  38.0 - 73.0 % Final    Lymph % 2024 23.2  18.0 - 48.0 % Final    Mono % 2024 4.5  4.0 - 15.0 % Final    Eosinophil % 2024 2.1  0.0 - 8.0 % Final    Basophil % 2024 0.3  0.0 - 1.9 % Final    Differential Method 2024 Automated   Final    Sodium 2024 137  136 - 145 mmol/L Final    Potassium 2024 4.2  3.5 - 5.1 mmol/L Final    Chloride 2024 107  95 - 110 mmol/L Final    CO2 2024 23  23 - 29 mmol/L Final    Glucose 2024 83  70 - 110 mg/dL Final    BUN 2024 11  6 - 20 mg/dL Final    Creatinine 2024 0.7  0.5 - 1.4 mg/dL Final    Calcium 2024 8.7  8.7 - 10.5 mg/dL Final    Total Protein 2024 6.4  6.0 - 8.4 g/dL Final    Albumin 2024 3.7  3.5 - 5.2 g/dL Final    Total Bilirubin 2024 0.4  0.1 - 1.0 mg/dL Final    Alkaline Phosphatase 2024 64  55 - 135 U/L Final    AST 2024 16  10 - 40 U/L Final    ALT 2024 21  10 - 44 U/L Final    eGFR 2024 >60.0  >60 mL/min/1.73 m^2 Final    Anion Gap 2024 7 (L)  8 - 16 mmol/L Final    Phosphorus 2024 2.4 (L)  2.7 - 4.5 mg/dL Final    Magnesium 2024 2.0  1.6 - 2.6 mg/dL Final    Specimen Type, p210, Quant, bld 2024 blood   Corrected    Final Diagnosis, p210, Quant, bld 2024 SEE BELOW   Final    BCR/ABL,p210 Result 2024 see interpretation   Final    Hemoglobin A1C 2024 5.0  4.0 - 5.6 % Final    Estimated Avg Glucose 2024 97  68 - 131 mg/dL Final       Past Medical History:   Diagnosis Date    Allergy     Chronic myeloid leukemia 2023    Gestational hypertension      Past Surgical History:   Procedure Laterality Date     SECTION      TONSILLECTOMY       Social History     Tobacco Use    Smoking status: Never    Smokeless tobacco: Never   Substance Use Topics    Alcohol use: Yes      Comment: Rare    Drug use: Never     Family History   Problem Relation Name Age of Onset    Abnormal EKG Father      Diabetes Father      Hypertension Mother      Arthritis Mother          DJD spine    Breast cancer Neg Hx      Ovarian cancer Neg Hx      Uterine cancer Neg Hx      Cervical cancer Neg Hx      Cancer Neg Hx      Colon cancer Neg Hx       OB History    Para Term  AB Living   4 4 2         SAB IAB Ectopic Multiple Live Births                  # Outcome Date GA Lbr Ward/2nd Weight Sex Type Anes PTL Lv   4 Term            3 Term            2 Para      CS-LTranv      1 Para      CS-LTranv          Current Outpatient Medications:     acyclovir (ZOVIRAX) 400 MG tablet, Take 400 mg by mouth 2 (two) times daily. (Patient not taking: Reported on 2024), Disp: , Rfl:     ALBUTEROL, REFILL, INHL, , Disp: , Rfl:     imatinib (GLEEVEC) 100 MG Tab, Take 2 tablets (200 mg total) by mouth once daily., Disp: 60 tablet, Rfl: 11    losartan (COZAAR) 25 MG tablet, Take 0.5 tablets (12.5 mg total) by mouth once daily., Disp: 30 tablet, Rfl: 11    mupirocin (BACTROBAN) 2 % ointment, Apply topically 2 (two) times daily., Disp: 22 g, Rfl: 0    ondansetron (ZOFRAN) 4 MG tablet, Take 1 tablet (4 mg total) by mouth every 6 (six) hours as needed for Nausea., Disp: 30 tablet, Rfl: 11    tirzepatide, weight loss, (ZEPBOUND) 2.5 mg/0.5 mL PnIj, Inject 2.5 mg into the skin every 7 days. (Patient not taking: Reported on 2024), Disp: 4 Pen, Rfl: 3    valACYclovir (VALTREX) 1000 MG tablet, Take 1 tablet (1,000 mg total) by mouth 2 (two) times daily. (Patient not taking: Reported on 2024), Disp: 30 tablet, Rfl: 6    Review of Systems:  General: No fever, chills.  Chest: No chest pain, shortness of breath, or palpitations.  Breast: No pain, masses, or nipple discharge.  Vulva: No pain, lesions, or itching.  Vagina: No relaxation, itching, discharge, or lesions.  Abdomen: No pain, nausea, vomiting, diarrhea, or  constipation.  Urinary: No incontinence, nocturia, frequency, or dysuria.  Extremities:  No leg cramps, edema, or calf pain.  Neurologic: No headaches, dizziness, or visual changes.    Objective:     Vitals:    09/16/24 0955   BP: (!) 138/90   Pulse: 80   Weight: 82.9 kg (182 lb 12.8 oz)   Height: 5' (1.524 m)   PainSc: 0-No pain     Body mass index is 35.7 kg/m².    PHYSICAL EXAM:  APPEARANCE: Well nourished, well developed, in no acute distress.  AFFECT: WNL, alert and oriented x 3      Assessment:      CML (chronic myelocytic leukemia)    Obesity, unspecified classification, unspecified obesity type, unspecified whether serious comorbidity present        Plan:   Continue low glycemic diet with lean protein at each meal.  Maintain hydration.  Continue compounded tirzepatide 2.5mg SC weekly.  Reviewed reasons to increase the dose and will let me know when she is ready  Repeat CMP in 3 months with oncology.  Follow up in 3 months    Instructed patient to call if she experiences any side effects or has any questions.    I spent a total of 25 minutes on the day of the visit.This includes face to face time and non-face to face time preparing to see the patient (eg, review of tests), obtaining and/or reviewing separately obtained history, documenting clinical information in the electronic or other health record, independently interpreting results and communicating results to the patient/family/caregiver, or care coordinator.

## 2024-09-17 ENCOUNTER — OFFICE VISIT (OUTPATIENT)
Dept: CARDIOLOGY | Facility: CLINIC | Age: 49
End: 2024-09-17
Payer: COMMERCIAL

## 2024-09-17 VITALS
OXYGEN SATURATION: 97 % | SYSTOLIC BLOOD PRESSURE: 138 MMHG | HEIGHT: 60 IN | DIASTOLIC BLOOD PRESSURE: 80 MMHG | WEIGHT: 185.19 LBS | BODY MASS INDEX: 36.36 KG/M2 | HEART RATE: 90 BPM

## 2024-09-17 DIAGNOSIS — Z82.49 FAMILY HISTORY OF HEART DISEASE: ICD-10-CM

## 2024-09-17 DIAGNOSIS — R00.2 PALPITATIONS: ICD-10-CM

## 2024-09-17 DIAGNOSIS — Z13.6 ENCOUNTER FOR SCREENING FOR CARDIOVASCULAR DISORDERS: ICD-10-CM

## 2024-09-17 DIAGNOSIS — Z87.59 HISTORY OF GESTATIONAL HYPERTENSION: ICD-10-CM

## 2024-09-17 DIAGNOSIS — Z86.16 HISTORY OF 2019 NOVEL CORONAVIRUS DISEASE (COVID-19): ICD-10-CM

## 2024-09-17 DIAGNOSIS — I10 ESSENTIAL HYPERTENSION: Primary | ICD-10-CM

## 2024-09-17 LAB
BCR/ABL,P210 RESULT: NORMAL
PATH REPORT.FINAL DX SPEC: NORMAL
SPECIMEN TYPE: NORMAL

## 2024-09-17 PROCEDURE — 99999 PR PBB SHADOW E&M-EST. PATIENT-LVL IV: CPT | Mod: PBBFAC,,, | Performed by: INTERNAL MEDICINE

## 2024-09-17 PROCEDURE — 3044F HG A1C LEVEL LT 7.0%: CPT | Mod: CPTII,S$GLB,, | Performed by: INTERNAL MEDICINE

## 2024-09-17 PROCEDURE — 1160F RVW MEDS BY RX/DR IN RCRD: CPT | Mod: CPTII,S$GLB,, | Performed by: INTERNAL MEDICINE

## 2024-09-17 PROCEDURE — 3079F DIAST BP 80-89 MM HG: CPT | Mod: CPTII,S$GLB,, | Performed by: INTERNAL MEDICINE

## 2024-09-17 PROCEDURE — 1159F MED LIST DOCD IN RCRD: CPT | Mod: CPTII,S$GLB,, | Performed by: INTERNAL MEDICINE

## 2024-09-17 PROCEDURE — 99204 OFFICE O/P NEW MOD 45 MIN: CPT | Mod: S$GLB,,, | Performed by: INTERNAL MEDICINE

## 2024-09-17 PROCEDURE — 4010F ACE/ARB THERAPY RXD/TAKEN: CPT | Mod: CPTII,S$GLB,, | Performed by: INTERNAL MEDICINE

## 2024-09-17 PROCEDURE — 3008F BODY MASS INDEX DOCD: CPT | Mod: CPTII,S$GLB,, | Performed by: INTERNAL MEDICINE

## 2024-09-17 PROCEDURE — 3075F SYST BP GE 130 - 139MM HG: CPT | Mod: CPTII,S$GLB,, | Performed by: INTERNAL MEDICINE

## 2024-09-17 NOTE — PROGRESS NOTES
Subjective:   Patient ID:  Audie Sandoval is a 48 y.o. female is a new patient who presents for evaluation of Chest Pain    Audie Sandoval is a 48 y.o. 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 She is taking compounded tirzepatide 2.5mg SC weekly. She has lost about 5lbs since last seen.  Tolerating well. Some nausea but better then with semaglutide. Denies skipping meals. No specific weight loss goal. Wants more energy with weight loss.   Recent URI and treated with z-robert. ALT increased slight. Oncology not worried but makes her nervous. No other changes.    HPI:   She just started on Munjaro  She has a lot of responsibilities. Mother, kids.   Grandmother had 5 stents starting in 60s she was a smoker  No chest pain, Orthopnea, PND of heart failure symptoms.   Denies palpitations or fluttering in the chest  At times feel flutters   At times get pinch  She has developed CML 2 yrs ago and now on Gleevac  She steps up exercise and heart rates go up and tires out.   She feels flutters in the chest that few min once a month  BP has been high and its because   She has gestatiional HTN - c-cestion  2nd pregnancy C -section  BP started to go up in 2 yrs and is related to weight gain .     The 10-year ASCVD risk score (Lub[place] DAVILA, et al., 2019) is: 1.9%    Values used to calculate the score:      Age: 48 years      Sex: Female      Is Non- : No      Diabetic: No      Tobacco smoker: No      Systolic Blood Pressure: 138 mmHg      Is BP treated: Yes      HDL Cholesterol: 46 mg/dL      Total Cholesterol: 203 mg/dL        Patient Active Problem List   Diagnosis    Essential hypertension    History of 2019 novel coronavirus disease (COVID-19)    CML (chronic myelocytic leukemia)    Dehydration    Severe obesity (BMI 35.0-39.9) with comorbidity    Drug-induced immunodeficiency    Iron deficiency     /80   Pulse 90   Ht 5' (1.524 m)   Wt  84 kg (185 lb 3 oz)   LMP 09/03/2024   SpO2 97%   BMI 36.17 kg/m²   Body mass index is 36.17 kg/m².  Estimated Creatinine Clearance: 75.1 mL/min (based on SCr of 0.9 mg/dL).    Lab Results   Component Value Date     09/13/2024    K 4.1 09/13/2024     09/13/2024    CO2 26 09/13/2024    BUN 11 09/13/2024    CREATININE 0.9 09/13/2024    GLU 85 09/13/2024    HGBA1C 5.0 06/17/2024    MG 1.9 09/13/2024    AST 26 09/13/2024    ALT 49 (H) 09/13/2024    ALBUMIN 4.2 09/13/2024    PROT 7.0 09/13/2024    BILITOT 0.5 09/13/2024    WBC 6.32 09/13/2024    HGB 14.3 09/13/2024    HCT 42.9 09/13/2024    MCV 95 09/13/2024     09/13/2024    INR 1.0 02/25/2023    TSH 1.812 01/18/2024    CHOL 203 (H) 01/18/2024    HDL 46 01/18/2024    LDLCALC 126.4 01/18/2024    TRIG 153 (H) 01/18/2024       Current Outpatient Medications   Medication Sig    acyclovir (ZOVIRAX) 400 MG tablet Take 400 mg by mouth 2 (two) times daily.    imatinib (GLEEVEC) 100 MG Tab Take 2 tablets (200 mg total) by mouth once daily. (Patient taking differently: Take 100 mg by mouth once daily.)    losartan (COZAAR) 25 MG tablet Take 0.5 tablets (12.5 mg total) by mouth once daily.    ondansetron (ZOFRAN) 4 MG tablet Take 1 tablet (4 mg total) by mouth every 6 (six) hours as needed for Nausea.    tirzepatide, weight loss, (ZEPBOUND) 2.5 mg/0.5 mL PnIj Inject 2.5 mg into the skin every 7 days.    ALBUTEROL, REFILL, INHL  (Patient not taking: Reported on 8/16/2024)    mupirocin (BACTROBAN) 2 % ointment Apply topically 2 (two) times daily. (Patient not taking: Reported on 9/17/2024)    valACYclovir (VALTREX) 1000 MG tablet Take 1 tablet (1,000 mg total) by mouth 2 (two) times daily. (Patient not taking: Reported on 8/16/2024)     No current facility-administered medications for this visit.       ROS    Objective:   Physical Exam  Constitutional:       General: She is not in acute distress.     Appearance: She is well-developed. She is not diaphoretic.    HENT:      Head: Normocephalic and atraumatic.      Nose: Nose normal.      Mouth/Throat:      Pharynx: No oropharyngeal exudate.   Eyes:      General: No scleral icterus.        Right eye: No discharge.         Left eye: No discharge.      Conjunctiva/sclera: Conjunctivae normal.      Pupils: Pupils are equal, round, and reactive to light.   Neck:      Thyroid: No thyromegaly.      Vascular: No JVD.      Trachea: No tracheal deviation.   Cardiovascular:      Rate and Rhythm: Normal rate and regular rhythm.      Pulses: Intact distal pulses.      Heart sounds: Normal heart sounds. No murmur heard.     No friction rub. No gallop.   Pulmonary:      Effort: Pulmonary effort is normal. No respiratory distress.      Breath sounds: Normal breath sounds. No stridor. No wheezing or rales.   Chest:      Chest wall: No tenderness.   Abdominal:      General: Bowel sounds are normal. There is no distension.      Palpations: Abdomen is soft. There is no mass.      Tenderness: There is no abdominal tenderness. There is no guarding or rebound.   Musculoskeletal:         General: No tenderness. Normal range of motion.      Cervical back: Normal range of motion and neck supple.   Lymphadenopathy:      Cervical: No cervical adenopathy.   Skin:     General: Skin is warm.      Coloration: Skin is not pale.      Findings: No erythema or rash.   Neurological:      Mental Status: She is alert and oriented to person, place, and time.      Cranial Nerves: No cranial nerve deficit.      Motor: No abnormal muscle tone.      Coordination: Coordination normal.      Deep Tendon Reflexes: Reflexes are normal and symmetric. Reflexes normal.   Psychiatric:         Behavior: Behavior normal.         Thought Content: Thought content normal.         Judgment: Judgment normal.         Assessment:     1. Essential hypertension    2. Family history of heart disease    3. History of gestational hypertension    4. History of 2019 novel coronavirus  disease (COVID-19)      Plan:   Audie was seen today for chest pain.    Diagnoses and all orders for this visit:    Essential hypertension  -     IN OFFICE EKG 12-LEAD (to Muse)    Family history of heart disease  -     Ambulatory referral/consult to Cardiology  -     LIPOPROTEIN A (LPA); Future  -     CRP, High Sensitivity; Future  -     IN OFFICE EKG 12-LEAD (to Muse)    History of gestational hypertension  -     Ambulatory referral/consult to Cardiology    History of 2019 novel coronavirus disease (COVID-19)    Palpitations  -     Cardiac event monitor; Future      Counseled on importance of heart healthy diet low in saturated and trans fat and salt as well gradually starting a regular aerobic exercise regimen with goal of 30min 5x/week. Recommend BP diary. Call if systolic BP > 130 mmHg on checking repeatedly    She does not want to do calcium score

## 2024-09-18 ENCOUNTER — LAB VISIT (OUTPATIENT)
Dept: LAB | Facility: HOSPITAL | Age: 49
End: 2024-09-18
Attending: INTERNAL MEDICINE
Payer: COMMERCIAL

## 2024-09-18 DIAGNOSIS — Z82.49 FAMILY HISTORY OF HEART DISEASE: ICD-10-CM

## 2024-09-18 LAB — CRP SERPL-MCNC: 2.76 MG/L (ref 0–3.19)

## 2024-09-18 PROCEDURE — 86141 C-REACTIVE PROTEIN HS: CPT | Performed by: INTERNAL MEDICINE

## 2024-09-18 PROCEDURE — 83695 ASSAY OF LIPOPROTEIN(A): CPT | Performed by: INTERNAL MEDICINE

## 2024-09-23 LAB — LPA SERPL-MCNC: 6 MG/DL (ref 0–30)

## 2024-09-25 ENCOUNTER — OFFICE VISIT (OUTPATIENT)
Dept: HEMATOLOGY/ONCOLOGY | Facility: CLINIC | Age: 49
End: 2024-09-25
Payer: COMMERCIAL

## 2024-09-25 ENCOUNTER — LAB VISIT (OUTPATIENT)
Dept: LAB | Facility: HOSPITAL | Age: 49
End: 2024-09-25
Payer: COMMERCIAL

## 2024-09-25 VITALS
BODY MASS INDEX: 35.99 KG/M2 | OXYGEN SATURATION: 98 % | WEIGHT: 183.31 LBS | TEMPERATURE: 98 F | HEART RATE: 76 BPM | HEIGHT: 60 IN | DIASTOLIC BLOOD PRESSURE: 83 MMHG | SYSTOLIC BLOOD PRESSURE: 139 MMHG

## 2024-09-25 DIAGNOSIS — C92.10 CML (CHRONIC MYELOCYTIC LEUKEMIA): ICD-10-CM

## 2024-09-25 DIAGNOSIS — R11.2 CINV (CHEMOTHERAPY-INDUCED NAUSEA AND VOMITING): ICD-10-CM

## 2024-09-25 DIAGNOSIS — C92.10 CML (CHRONIC MYELOID LEUKEMIA): Primary | ICD-10-CM

## 2024-09-25 DIAGNOSIS — T45.1X5A CINV (CHEMOTHERAPY-INDUCED NAUSEA AND VOMITING): ICD-10-CM

## 2024-09-25 DIAGNOSIS — D50.9 IRON DEFICIENCY ANEMIA, UNSPECIFIED IRON DEFICIENCY ANEMIA TYPE: ICD-10-CM

## 2024-09-25 DIAGNOSIS — C92.10 CML (CHRONIC MYELOID LEUKEMIA): ICD-10-CM

## 2024-09-25 LAB
ALBUMIN SERPL BCP-MCNC: 4.2 G/DL (ref 3.5–5.2)
ALP SERPL-CCNC: 61 U/L (ref 55–135)
ALT SERPL W/O P-5'-P-CCNC: 37 U/L (ref 10–44)
ANION GAP SERPL CALC-SCNC: 7 MMOL/L (ref 8–16)
AST SERPL-CCNC: 21 U/L (ref 10–40)
BASOPHILS # BLD AUTO: 0.02 K/UL (ref 0–0.2)
BASOPHILS NFR BLD: 0.3 % (ref 0–1.9)
BILIRUB SERPL-MCNC: 0.4 MG/DL (ref 0.1–1)
BUN SERPL-MCNC: 12 MG/DL (ref 6–20)
CALCIUM SERPL-MCNC: 9.5 MG/DL (ref 8.7–10.5)
CHLORIDE SERPL-SCNC: 109 MMOL/L (ref 95–110)
CO2 SERPL-SCNC: 22 MMOL/L (ref 23–29)
CREAT SERPL-MCNC: 0.8 MG/DL (ref 0.5–1.4)
DIFFERENTIAL METHOD BLD: ABNORMAL
EOSINOPHIL # BLD AUTO: 0.1 K/UL (ref 0–0.5)
EOSINOPHIL NFR BLD: 1.2 % (ref 0–8)
ERYTHROCYTE [DISTWIDTH] IN BLOOD BY AUTOMATED COUNT: 12.9 % (ref 11.5–14.5)
EST. GFR  (NO RACE VARIABLE): >60 ML/MIN/1.73 M^2
GLUCOSE SERPL-MCNC: 84 MG/DL (ref 70–110)
HCT VFR BLD AUTO: 45 % (ref 37–48.5)
HGB BLD-MCNC: 14.4 G/DL (ref 12–16)
IMM GRANULOCYTES # BLD AUTO: 0.02 K/UL (ref 0–0.04)
IMM GRANULOCYTES NFR BLD AUTO: 0.3 % (ref 0–0.5)
LYMPHOCYTES # BLD AUTO: 1.7 K/UL (ref 1–4.8)
LYMPHOCYTES NFR BLD: 24.7 % (ref 18–48)
MCH RBC QN AUTO: 31.2 PG (ref 27–31)
MCHC RBC AUTO-ENTMCNC: 32 G/DL (ref 32–36)
MCV RBC AUTO: 97 FL (ref 82–98)
MONOCYTES # BLD AUTO: 0.2 K/UL (ref 0.3–1)
MONOCYTES NFR BLD: 3.4 % (ref 4–15)
NEUTROPHILS # BLD AUTO: 4.8 K/UL (ref 1.8–7.7)
NEUTROPHILS NFR BLD: 70.1 % (ref 38–73)
NRBC BLD-RTO: 0 /100 WBC
PLATELET # BLD AUTO: 194 K/UL (ref 150–450)
PMV BLD AUTO: 10.7 FL (ref 9.2–12.9)
POTASSIUM SERPL-SCNC: 4.9 MMOL/L (ref 3.5–5.1)
PROT SERPL-MCNC: 7.1 G/DL (ref 6–8.4)
RBC # BLD AUTO: 4.62 M/UL (ref 4–5.4)
SODIUM SERPL-SCNC: 138 MMOL/L (ref 136–145)
WBC # BLD AUTO: 6.8 K/UL (ref 3.9–12.7)

## 2024-09-25 PROCEDURE — 3075F SYST BP GE 130 - 139MM HG: CPT | Mod: CPTII,S$GLB,, | Performed by: INTERNAL MEDICINE

## 2024-09-25 PROCEDURE — 1160F RVW MEDS BY RX/DR IN RCRD: CPT | Mod: CPTII,S$GLB,, | Performed by: INTERNAL MEDICINE

## 2024-09-25 PROCEDURE — 80053 COMPREHEN METABOLIC PANEL: CPT | Performed by: INTERNAL MEDICINE

## 2024-09-25 PROCEDURE — 3008F BODY MASS INDEX DOCD: CPT | Mod: CPTII,S$GLB,, | Performed by: INTERNAL MEDICINE

## 2024-09-25 PROCEDURE — 3044F HG A1C LEVEL LT 7.0%: CPT | Mod: CPTII,S$GLB,, | Performed by: INTERNAL MEDICINE

## 2024-09-25 PROCEDURE — 81170 ABL1 GENE: CPT | Performed by: INTERNAL MEDICINE

## 2024-09-25 PROCEDURE — 99999 PR PBB SHADOW E&M-EST. PATIENT-LVL III: CPT | Mod: PBBFAC,,, | Performed by: INTERNAL MEDICINE

## 2024-09-25 PROCEDURE — 4010F ACE/ARB THERAPY RXD/TAKEN: CPT | Mod: CPTII,S$GLB,, | Performed by: INTERNAL MEDICINE

## 2024-09-25 PROCEDURE — 1159F MED LIST DOCD IN RCRD: CPT | Mod: CPTII,S$GLB,, | Performed by: INTERNAL MEDICINE

## 2024-09-25 PROCEDURE — 36415 COLL VENOUS BLD VENIPUNCTURE: CPT | Performed by: INTERNAL MEDICINE

## 2024-09-25 PROCEDURE — 3079F DIAST BP 80-89 MM HG: CPT | Mod: CPTII,S$GLB,, | Performed by: INTERNAL MEDICINE

## 2024-09-25 PROCEDURE — 99215 OFFICE O/P EST HI 40 MIN: CPT | Mod: S$GLB,,, | Performed by: INTERNAL MEDICINE

## 2024-09-25 PROCEDURE — 85025 COMPLETE CBC W/AUTO DIFF WBC: CPT | Performed by: INTERNAL MEDICINE

## 2024-09-25 NOTE — PROGRESS NOTES
Section of Hematology and Stem Cell Transplantation  Follow Up Visit     Date of visit: 24  Visit diagnosis: CML (chronic myeloid leukemia) [C92.10]    Oncologic History:     Primary Oncologic Diagnosis: Chronic myeloid leukemia, chronic phase    23: Routine labs notable for , Hgb 11.1, Plts 165.  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 Karyotype 46,XX,t(9;22)(q34;q11.2)[20]. BCR/ABL 27%. She had low risk disease by Sokal.   3/26/23: She started imatinib 400mg daily.   23: BCR/ABL at 3 months - 5.5%  23: Imatinib decreased to 300mg daily due to N/V and facial swelling.  23: BCR/ABL at 6 months - 0.4%  10/12/23: Imatinib decreased to 200mg daily due to persistent N/V and facial swelling.  23: BCR/ABL at 9 months - 1.3%  24: Recheck BCR/ABL - 1.0%  3/6/24: BCR/ABL 0.7%  24: BCR/ABL p210 2.8%.     History of Present Ilness:   Audie Sandoval (Audie) is a pleasant 48 y.o.female who presents for follow up regarding CML.  She feels well. No new side effects from imatinib. We reviewed rising BCR/ABL transcript.     PAST MEDICAL HISTORY:   Past Medical History:   Diagnosis Date    Allergy     Chronic myeloid leukemia 2023    Gestational hypertension        PAST SURGICAL HISTORY:   Past Surgical History:   Procedure Laterality Date     SECTION      TONSILLECTOMY         PAST SOCIAL HISTORY:  Social History     Tobacco Use    Smoking status: Never    Smokeless tobacco: Never   Substance Use Topics    Alcohol use: Yes     Comment: Rare    Drug use: Never       FAMILY HISTORY:  Family History   Problem Relation Name Age of Onset    Abnormal EKG Father      Diabetes Father      Hypertension Mother      Arthritis Mother          DJD spine    Breast cancer Neg Hx      Ovarian cancer Neg Hx      Uterine cancer Neg Hx      Cervical cancer Neg Hx      Cancer Neg Hx      Colon cancer Neg Hx         CURRENT MEDICATIONS:   Current Outpatient Medications    Medication Sig    acyclovir (ZOVIRAX) 400 MG tablet Take 400 mg by mouth 2 (two) times daily.    losartan (COZAAR) 25 MG tablet Take 0.5 tablets (12.5 mg total) by mouth once daily.    ondansetron (ZOFRAN) 4 MG tablet Take 1 tablet (4 mg total) by mouth every 6 (six) hours as needed for Nausea.    tirzepatide, weight loss, (ZEPBOUND) 2.5 mg/0.5 mL PnIj Inject 2.5 mg into the skin every 7 days.    ALBUTEROL, REFILL, INHL  (Patient not taking: Reported on 8/16/2024)    dasatinib (SPRYCEL) 100 mg Take 1 tablet (100 mg total) by mouth once daily.    mupirocin (BACTROBAN) 2 % ointment Apply topically 2 (two) times daily. (Patient not taking: Reported on 9/17/2024)    valACYclovir (VALTREX) 1000 MG tablet Take 1 tablet (1,000 mg total) by mouth 2 (two) times daily. (Patient not taking: Reported on 8/16/2024)     No current facility-administered medications for this visit.       ALLERGIES:   Review of patient's allergies indicates:   Allergen Reactions    Codeine     Wheat containing prod        Review of Systems:     Pertinent positives and negatives included in the HPI. Otherwise a complete review of systems is negative.    Physical Exam:     Vitals:    09/25/24 0858   BP: 139/83   Pulse: 76   Temp: 98.3 °F (36.8 °C)     General: Appears well, NAD  Pulmonary: CTAB, no increased work of breathing, no W/R/C  Cardiovascular: S1S2 normal, RRR, no M/R/G  Abdominal: Soft, NT, ND, BS+, no HSM  Extremities: No C/C/E  Neurological: AAOx4, grossly normal, no focal deficits  Dermatologic: No appreciable rashes or lesions    ECOG Performance Status: (foot note - ECOG PS provided by Eastern Cooperative Oncology Group) 0 - Asymptomatic    Karnofsky Performance Score:  100%- Normal, No Complaints, No Evidence of Disease    Labs:   Lab Results   Component Value Date    WBC 6.80 09/25/2024    RBC 4.62 09/25/2024    HGB 14.4 09/25/2024    HCT 45.0 09/25/2024    MCV 97 09/25/2024    MCH 31.2 (H) 09/25/2024    MCHC 32.0 09/25/2024    RDW  12.9 09/25/2024     09/25/2024    MPV 10.7 09/25/2024    GRAN 4.8 09/25/2024    GRAN 70.1 09/25/2024    LYMPH 1.7 09/25/2024    LYMPH 24.7 09/25/2024    MONO 0.2 (L) 09/25/2024    MONO 3.4 (L) 09/25/2024    EOS 0.1 09/25/2024    BASO 0.02 09/25/2024    EOSINOPHIL 1.2 09/25/2024    BASOPHIL 0.3 09/25/2024       CMP  Sodium   Date Value Ref Range Status   09/25/2024 138 136 - 145 mmol/L Final     Potassium   Date Value Ref Range Status   09/25/2024 4.9 3.5 - 5.1 mmol/L Final     Chloride   Date Value Ref Range Status   09/25/2024 109 95 - 110 mmol/L Final     CO2   Date Value Ref Range Status   09/25/2024 22 (L) 23 - 29 mmol/L Final     Glucose   Date Value Ref Range Status   09/25/2024 84 70 - 110 mg/dL Final     BUN   Date Value Ref Range Status   09/25/2024 12 6 - 20 mg/dL Final     Creatinine   Date Value Ref Range Status   09/25/2024 0.8 0.5 - 1.4 mg/dL Final     Calcium   Date Value Ref Range Status   09/25/2024 9.5 8.7 - 10.5 mg/dL Final     Total Protein   Date Value Ref Range Status   09/25/2024 7.1 6.0 - 8.4 g/dL Final     Albumin   Date Value Ref Range Status   09/25/2024 4.2 3.5 - 5.2 g/dL Final     Total Bilirubin   Date Value Ref Range Status   09/25/2024 0.4 0.1 - 1.0 mg/dL Final     Comment:     For infants and newborns, interpretation of results should be based  on gestational age, weight and in agreement with clinical  observations.    Premature Infant recommended reference ranges:  Up to 24 hours.............<8.0 mg/dL  Up to 48 hours............<12.0 mg/dL  3-5 days..................<15.0 mg/dL  6-29 days.................<15.0 mg/dL       Alkaline Phosphatase   Date Value Ref Range Status   09/25/2024 61 55 - 135 U/L Final     AST   Date Value Ref Range Status   09/25/2024 21 10 - 40 U/L Final     ALT   Date Value Ref Range Status   09/25/2024 37 10 - 44 U/L Final     Anion Gap   Date Value Ref Range Status   09/25/2024 7 (L) 8 - 16 mmol/L Final     eGFR if    Date Value Ref  Range Status   12/15/2020 >60.0 >60 mL/min/1.73 m^2 Final     eGFR if non    Date Value Ref Range Status   12/15/2020 >60.0 >60 mL/min/1.73 m^2 Final     Comment:     Calculation used to obtain the estimated glomerular filtration  rate (eGFR) is the CKD-EPI equation.          Imaging:   No results found for this or any previous visit (from the past 2160 hour(s)).    Pathology:  Reviewed     Assessment and Plan:   Audie Sandoval (Audie) is a pleasant 48 y.o.female with CP-CML who presents for follow up.    Chronic myeloid leukemia, chronic phase:  She was diagnosed with chronic phase CML on 3/8/23. CG with t(9;22) without additional chromosomal abnormalities. She has low risk disease by Sokal. She started imatinib 400mg daily on 3/26/23, but she had N/V and facial swelling ultimately requiring dose reductions to 200mg daily (since 10/2023). Her BCR/ABL decreased appropriately by 6 months (9/2023) to 0.4%; however, more recently it increased to 1.3%. Repeat on 1/30/24 noted improvement to 1%. Repeat on 3/6/24 revealed an improvement to 0.7%. BCR/ABL now increasing >1 (2.8%), so will send mutation panel to assess for a kinase domain mutation. We discussed next lines of treatment - will start dasatinib.   - Start dasatinib 100mg daily. Sent to [facility].   - Once starting, monitor labs weekly for first month.     Chemotherapy-induced nausea/vomiting:  Zofran as needed.    Iron deficiency:  Iron stores previously notable for mild iron deficiency without anemia.  Iron sat 16% 3 months ago. I believe this may be contributing to her fatigue.  She received Injectafer on 7/25/24. Will repeat iron studies prior to next visit.     Orders/Follow Up:      Orders Placed This Encounter    BCR/ABL Mutation, ASPE. Blood    Ferritin    Iron and TIBC    dasatinib (SPRYCEL) 100 mg       Route Chart for Scheduling    BMT Chart Routing      Follow up with physician 6 weeks.   Follow up with MICHELL    Provider visit type     Infusion scheduling note    Injection scheduling note    Labs CBC, CMP, phosphorus and magnesium   Scheduling:  Preferred lab:  Lab interval:  Starting in 2 weeks, please schedule labs weekly (CBC, CMP, Mg, Phos). In labs prior to visit in 6 weeks, please include iron/tibc and ferritin in addition to routine labs   Imaging    Pharmacy appointment    Other referrals                         Total time of this visit was 40 minutes, including time spent face to face with patient and/or via video/audio, and also in preparing for today's visit for MDM and documentation. (Medical Decision Making, including consideration of possible diagnoses, management options, complex medical record review, review of diagnostic tests and information, consideration and discussion of significant complications based on comorbidities, and discussion with providers involved with the care of the patient). Greater than 50% was spent face to face with the patient counseling and coordinating care.    Hamzah Conteh MD  Malignant Hematology, Stem Cell Transplant, and Cellular Therapy  [facility]  [facility]

## 2024-10-01 ENCOUNTER — PATIENT MESSAGE (OUTPATIENT)
Dept: HEMATOLOGY/ONCOLOGY | Facility: CLINIC | Age: 49
End: 2024-10-01
Payer: COMMERCIAL

## 2024-10-01 LAB
BCR/ABL SPECIMEN TYPE (BLOOD): NORMAL
BCR/ABL1 KINASE DOMAIN MUT ANL BLD/T: 210
PATH REPORT.FINAL DX SPEC: NORMAL

## 2024-10-09 ENCOUNTER — LAB VISIT (OUTPATIENT)
Dept: LAB | Facility: HOSPITAL | Age: 49
End: 2024-10-09
Attending: INTERNAL MEDICINE
Payer: COMMERCIAL

## 2024-10-09 DIAGNOSIS — C92.10 CML (CHRONIC MYELOCYTIC LEUKEMIA): ICD-10-CM

## 2024-10-09 LAB
ALBUMIN SERPL BCP-MCNC: 3.8 G/DL (ref 3.5–5.2)
ALP SERPL-CCNC: 60 U/L (ref 55–135)
ALT SERPL W/O P-5'-P-CCNC: 29 U/L (ref 10–44)
ANION GAP SERPL CALC-SCNC: 6 MMOL/L (ref 8–16)
AST SERPL-CCNC: 19 U/L (ref 10–40)
BASOPHILS # BLD AUTO: 0.02 K/UL (ref 0–0.2)
BASOPHILS NFR BLD: 0.2 % (ref 0–1.9)
BILIRUB SERPL-MCNC: 0.4 MG/DL (ref 0.1–1)
BUN SERPL-MCNC: 10 MG/DL (ref 6–20)
CALCIUM SERPL-MCNC: 9.2 MG/DL (ref 8.7–10.5)
CHLORIDE SERPL-SCNC: 108 MMOL/L (ref 95–110)
CO2 SERPL-SCNC: 24 MMOL/L (ref 23–29)
CREAT SERPL-MCNC: 0.8 MG/DL (ref 0.5–1.4)
DIFFERENTIAL METHOD BLD: ABNORMAL
EOSINOPHIL # BLD AUTO: 0.1 K/UL (ref 0–0.5)
EOSINOPHIL NFR BLD: 1.4 % (ref 0–8)
ERYTHROCYTE [DISTWIDTH] IN BLOOD BY AUTOMATED COUNT: 13 % (ref 11.5–14.5)
EST. GFR  (NO RACE VARIABLE): >60 ML/MIN/1.73 M^2
GLUCOSE SERPL-MCNC: 92 MG/DL (ref 70–110)
HCT VFR BLD AUTO: 38.2 % (ref 37–48.5)
HGB BLD-MCNC: 12.9 G/DL (ref 12–16)
IMM GRANULOCYTES # BLD AUTO: 0.02 K/UL (ref 0–0.04)
IMM GRANULOCYTES NFR BLD AUTO: 0.2 % (ref 0–0.5)
LYMPHOCYTES # BLD AUTO: 3.2 K/UL (ref 1–4.8)
LYMPHOCYTES NFR BLD: 37.2 % (ref 18–48)
MAGNESIUM SERPL-MCNC: 2 MG/DL (ref 1.6–2.6)
MCH RBC QN AUTO: 31.8 PG (ref 27–31)
MCHC RBC AUTO-ENTMCNC: 33.8 G/DL (ref 32–36)
MCV RBC AUTO: 94 FL (ref 82–98)
MONOCYTES # BLD AUTO: 0.4 K/UL (ref 0.3–1)
MONOCYTES NFR BLD: 4.2 % (ref 4–15)
NEUTROPHILS # BLD AUTO: 4.8 K/UL (ref 1.8–7.7)
NEUTROPHILS NFR BLD: 56.8 % (ref 38–73)
NRBC BLD-RTO: 0 /100 WBC
PHOSPHATE SERPL-MCNC: 2.4 MG/DL (ref 2.7–4.5)
PLATELET # BLD AUTO: 189 K/UL (ref 150–450)
PMV BLD AUTO: 10.5 FL (ref 9.2–12.9)
POTASSIUM SERPL-SCNC: 4.1 MMOL/L (ref 3.5–5.1)
PROT SERPL-MCNC: 6.4 G/DL (ref 6–8.4)
RBC # BLD AUTO: 4.06 M/UL (ref 4–5.4)
SODIUM SERPL-SCNC: 138 MMOL/L (ref 136–145)
WBC # BLD AUTO: 8.54 K/UL (ref 3.9–12.7)

## 2024-10-09 PROCEDURE — 83735 ASSAY OF MAGNESIUM: CPT | Performed by: INTERNAL MEDICINE

## 2024-10-09 PROCEDURE — 84100 ASSAY OF PHOSPHORUS: CPT | Performed by: INTERNAL MEDICINE

## 2024-10-09 PROCEDURE — 80053 COMPREHEN METABOLIC PANEL: CPT | Performed by: INTERNAL MEDICINE

## 2024-10-09 PROCEDURE — 36415 COLL VENOUS BLD VENIPUNCTURE: CPT | Mod: PN | Performed by: INTERNAL MEDICINE

## 2024-10-09 PROCEDURE — 85025 COMPLETE CBC W/AUTO DIFF WBC: CPT | Performed by: INTERNAL MEDICINE

## 2024-10-10 ENCOUNTER — PATIENT MESSAGE (OUTPATIENT)
Dept: HEMATOLOGY/ONCOLOGY | Facility: CLINIC | Age: 49
End: 2024-10-10
Payer: COMMERCIAL

## 2024-10-11 ENCOUNTER — PATIENT MESSAGE (OUTPATIENT)
Dept: RESEARCH | Facility: HOSPITAL | Age: 49
End: 2024-10-11
Payer: COMMERCIAL

## 2024-10-16 ENCOUNTER — LAB VISIT (OUTPATIENT)
Dept: LAB | Facility: HOSPITAL | Age: 49
End: 2024-10-16
Attending: INTERNAL MEDICINE
Payer: COMMERCIAL

## 2024-10-16 DIAGNOSIS — C92.10 CML (CHRONIC MYELOCYTIC LEUKEMIA): ICD-10-CM

## 2024-10-16 LAB
ALBUMIN SERPL BCP-MCNC: 3.8 G/DL (ref 3.5–5.2)
ALP SERPL-CCNC: 51 U/L (ref 55–135)
ALT SERPL W/O P-5'-P-CCNC: 43 U/L (ref 10–44)
ANION GAP SERPL CALC-SCNC: 7 MMOL/L (ref 8–16)
AST SERPL-CCNC: 26 U/L (ref 10–40)
BASOPHILS # BLD AUTO: 0.01 K/UL (ref 0–0.2)
BASOPHILS NFR BLD: 0.1 % (ref 0–1.9)
BILIRUB SERPL-MCNC: 0.4 MG/DL (ref 0.1–1)
BUN SERPL-MCNC: 11 MG/DL (ref 6–20)
CALCIUM SERPL-MCNC: 9.4 MG/DL (ref 8.7–10.5)
CHLORIDE SERPL-SCNC: 105 MMOL/L (ref 95–110)
CO2 SERPL-SCNC: 25 MMOL/L (ref 23–29)
CREAT SERPL-MCNC: 0.7 MG/DL (ref 0.5–1.4)
DIFFERENTIAL METHOD BLD: ABNORMAL
EOSINOPHIL # BLD AUTO: 0.1 K/UL (ref 0–0.5)
EOSINOPHIL NFR BLD: 1.3 % (ref 0–8)
ERYTHROCYTE [DISTWIDTH] IN BLOOD BY AUTOMATED COUNT: 13.2 % (ref 11.5–14.5)
EST. GFR  (NO RACE VARIABLE): >60 ML/MIN/1.73 M^2
GLUCOSE SERPL-MCNC: 98 MG/DL (ref 70–110)
HCT VFR BLD AUTO: 40.8 % (ref 37–48.5)
HGB BLD-MCNC: 13 G/DL (ref 12–16)
IMM GRANULOCYTES # BLD AUTO: 0.02 K/UL (ref 0–0.04)
IMM GRANULOCYTES NFR BLD AUTO: 0.3 % (ref 0–0.5)
LYMPHOCYTES # BLD AUTO: 2.5 K/UL (ref 1–4.8)
LYMPHOCYTES NFR BLD: 37.8 % (ref 18–48)
MAGNESIUM SERPL-MCNC: 2 MG/DL (ref 1.6–2.6)
MCH RBC QN AUTO: 31.6 PG (ref 27–31)
MCHC RBC AUTO-ENTMCNC: 31.9 G/DL (ref 32–36)
MCV RBC AUTO: 99 FL (ref 82–98)
MONOCYTES # BLD AUTO: 0.3 K/UL (ref 0.3–1)
MONOCYTES NFR BLD: 3.9 % (ref 4–15)
NEUTROPHILS # BLD AUTO: 3.8 K/UL (ref 1.8–7.7)
NEUTROPHILS NFR BLD: 56.6 % (ref 38–73)
NRBC BLD-RTO: 0 /100 WBC
PHOSPHATE SERPL-MCNC: 3 MG/DL (ref 2.7–4.5)
PLATELET # BLD AUTO: 161 K/UL (ref 150–450)
PMV BLD AUTO: 11 FL (ref 9.2–12.9)
POTASSIUM SERPL-SCNC: 4.4 MMOL/L (ref 3.5–5.1)
PROT SERPL-MCNC: 6.4 G/DL (ref 6–8.4)
RBC # BLD AUTO: 4.12 M/UL (ref 4–5.4)
SODIUM SERPL-SCNC: 137 MMOL/L (ref 136–145)
WBC # BLD AUTO: 6.67 K/UL (ref 3.9–12.7)

## 2024-10-16 PROCEDURE — 84100 ASSAY OF PHOSPHORUS: CPT | Performed by: INTERNAL MEDICINE

## 2024-10-16 PROCEDURE — 80053 COMPREHEN METABOLIC PANEL: CPT | Performed by: INTERNAL MEDICINE

## 2024-10-16 PROCEDURE — 36415 COLL VENOUS BLD VENIPUNCTURE: CPT | Mod: PO | Performed by: INTERNAL MEDICINE

## 2024-10-16 PROCEDURE — 83735 ASSAY OF MAGNESIUM: CPT | Performed by: INTERNAL MEDICINE

## 2024-10-16 PROCEDURE — 85025 COMPLETE CBC W/AUTO DIFF WBC: CPT | Performed by: INTERNAL MEDICINE

## 2024-10-17 ENCOUNTER — PATIENT OUTREACH (OUTPATIENT)
Dept: ADMINISTRATIVE | Facility: HOSPITAL | Age: 49
End: 2024-10-17
Payer: COMMERCIAL

## 2024-10-18 ENCOUNTER — TELEPHONE (OUTPATIENT)
Dept: RESEARCH | Facility: HOSPITAL | Age: 49
End: 2024-10-18

## 2024-10-22 ENCOUNTER — HOSPITAL ENCOUNTER (OUTPATIENT)
Dept: RADIOLOGY | Facility: HOSPITAL | Age: 49
Discharge: HOME | End: 2024-10-22
Attending: NURSE PRACTITIONER
Payer: COMMERCIAL

## 2024-10-22 ENCOUNTER — OFFICE VISIT (OUTPATIENT)
Dept: INTERNAL MEDICINE | Facility: CLINIC | Age: 49
End: 2024-10-22
Payer: COMMERCIAL

## 2024-10-22 VITALS
HEART RATE: 74 BPM | DIASTOLIC BLOOD PRESSURE: 72 MMHG | TEMPERATURE: 97 F | SYSTOLIC BLOOD PRESSURE: 102 MMHG | RESPIRATION RATE: 16 BRPM | BODY MASS INDEX: 36.84 KG/M2 | WEIGHT: 187.63 LBS | HEIGHT: 60 IN | OXYGEN SATURATION: 99 %

## 2024-10-22 DIAGNOSIS — R05.1 ACUTE COUGH: ICD-10-CM

## 2024-10-22 DIAGNOSIS — R07.89 CHEST TIGHTNESS: ICD-10-CM

## 2024-10-22 DIAGNOSIS — Z79.899 DRUG-INDUCED IMMUNODEFICIENCY: ICD-10-CM

## 2024-10-22 DIAGNOSIS — R07.89 CHEST TIGHTNESS: Primary | ICD-10-CM

## 2024-10-22 DIAGNOSIS — C92.10 CML (CHRONIC MYELOCYTIC LEUKEMIA): ICD-10-CM

## 2024-10-22 DIAGNOSIS — D84.821 DRUG-INDUCED IMMUNODEFICIENCY: ICD-10-CM

## 2024-10-22 PROCEDURE — 3044F HG A1C LEVEL LT 7.0%: CPT | Mod: CPTII,S$GLB,, | Performed by: NURSE PRACTITIONER

## 2024-10-22 PROCEDURE — 99999 PR PBB SHADOW E&M-EST. PATIENT-LVL IV: CPT | Mod: PBBFAC,,, | Performed by: NURSE PRACTITIONER

## 2024-10-22 PROCEDURE — 3078F DIAST BP <80 MM HG: CPT | Mod: CPTII,S$GLB,, | Performed by: NURSE PRACTITIONER

## 2024-10-22 PROCEDURE — 3074F SYST BP LT 130 MM HG: CPT | Mod: CPTII,S$GLB,, | Performed by: NURSE PRACTITIONER

## 2024-10-22 PROCEDURE — 1159F MED LIST DOCD IN RCRD: CPT | Mod: CPTII,S$GLB,, | Performed by: NURSE PRACTITIONER

## 2024-10-22 PROCEDURE — 1160F RVW MEDS BY RX/DR IN RCRD: CPT | Mod: CPTII,S$GLB,, | Performed by: NURSE PRACTITIONER

## 2024-10-22 PROCEDURE — 99213 OFFICE O/P EST LOW 20 MIN: CPT | Mod: S$GLB,,, | Performed by: NURSE PRACTITIONER

## 2024-10-22 PROCEDURE — 4010F ACE/ARB THERAPY RXD/TAKEN: CPT | Mod: CPTII,S$GLB,, | Performed by: NURSE PRACTITIONER

## 2024-10-22 PROCEDURE — 71046 X-RAY EXAM CHEST 2 VIEWS: CPT | Mod: 26,,, | Performed by: RADIOLOGY

## 2024-10-22 PROCEDURE — 71046 X-RAY EXAM CHEST 2 VIEWS: CPT | Mod: TC,PO

## 2024-10-22 PROCEDURE — 3008F BODY MASS INDEX DOCD: CPT | Mod: CPTII,S$GLB,, | Performed by: NURSE PRACTITIONER

## 2024-10-22 RX ORDER — ALBUTEROL SULFATE 90 UG/1
1-2 AEROSOL, METERED RESPIRATORY (INHALATION) EVERY 6 HOURS PRN
Qty: 18 G | Refills: 1 | Status: SHIPPED | OUTPATIENT
Start: 2024-10-22 | End: 2025-10-22

## 2024-10-22 NOTE — PROGRESS NOTES
"Subjective:       Patient ID: Audie Sandoval is a 48 y.o. female.    Chief Complaint: Follow-up, Breathing Problem ("Tight" breathing), Medication Refill (albuterol), and Cough    History of Present Illness    CHIEF COMPLAINT:  Ms. Sandoval presents today for evaluation of new symptoms related to recently started medication for chronic myeloid leukemia (CML).    MEDICATION SIDE EFFECTS:  She reports experiencing side effects from a new CML medication started approximately 1-2 weeks ago. Symptoms include chest tightness (described as tender and tight, different from shortness of breath) that began yesterday, a severe headache lasting four days, and morning symptoms of feeling flushed with burning eyes that typically resolve by afternoon. Symptoms are generally worse from wake-up until about 3:00 PM, after which she feels significantly better. She acknowledges the difficulty of adjusting to CML medications.    RESPIRATORY SYMPTOMS:  She describes chest tightness and shortness of breath since yesterday, similar to bronchitis, with soreness when taking deep breaths. A cough has developed, which was also present last week. She reports a history of typically developing bronchitis when ill. She denies fever or body aches. Deep breathing does not hurt but feels "different." She requests a new inhaler.    MEDICATIONS:  She confirms current use of losartan for blood pressure management. She reports using an albuterol inhaler, particularly during nighttime, noting she has had the same inhaler for a year.    RECENT MEDICAL HISTORY:  She had a recent visit to a cardiologist within the past week, during which cardiac markers were evaluated.      ROS:  Constitutional: -fevers  Head: +headaches  Respiratory: +shortness of breath, +cough  Cardiovascular: +chest tightness  Musculoskeletal: -muscle pain         Review of patient's allergies indicates:   Allergen Reactions    Codeine     Wheat containing prod      Gluten sensitivity "       Medication List with Changes/Refills   New Medications    ALBUTEROL (VENTOLIN HFA) 90 MCG/ACTUATION INHALER    Inhale 1-2 puffs into the lungs every 6 (six) hours as needed for Wheezing or Shortness of Breath. Rescue   Current Medications    ACYCLOVIR (ZOVIRAX) 400 MG TABLET    Take 400 mg by mouth 2 (two) times daily.    ALBUTEROL, REFILL, INHL        CETIRIZINE (ZYRTEC) 5 MG CHEWABLE TABLET    Take 5 mg by mouth nightly as needed for Allergies.    DASATINIB (SPRYCEL) 100 MG    Take 1 tablet (100 mg total) by mouth once daily.    LOSARTAN (COZAAR) 25 MG TABLET    Take 0.5 tablets (12.5 mg total) by mouth once daily.    MUPIROCIN (BACTROBAN) 2 % OINTMENT    Apply topically 2 (two) times daily.    ONDANSETRON (ZOFRAN) 4 MG TABLET    Take 1 tablet (4 mg total) by mouth every 6 (six) hours as needed for Nausea.    TIRZEPATIDE (MOUNJARO) 2.5 MG/0.5 ML PNIJ    Inject 2.5 mg into the skin every 7 days.    TIRZEPATIDE, WEIGHT LOSS, (ZEPBOUND) 2.5 MG/0.5 ML PNIJ    Inject 2.5 mg into the skin every 7 days.    VALACYCLOVIR (VALTREX) 1000 MG TABLET    Take 1 tablet (1,000 mg total) by mouth 2 (two) times daily.     Objective:   /72 (BP Location: Left arm, Patient Position: Sitting)   Pulse 74   Temp 97.4 °F (36.3 °C) (Temporal)   Resp 16   Ht 5' (1.524 m)   Wt 85.1 kg (187 lb 9.8 oz)   LMP 09/29/2024 (Exact Date)   SpO2 99%   BMI 36.64 kg/m²     Physical Exam  Vitals reviewed.   Constitutional:       Appearance: Normal appearance.   HENT:      Head: Normocephalic and atraumatic.      Right Ear: Tympanic membrane normal.      Left Ear: Tympanic membrane normal.      Nose: Nose normal.      Mouth/Throat:      Pharynx: Oropharynx is clear.   Cardiovascular:      Rate and Rhythm: Normal rate and regular rhythm.      Heart sounds: Normal heart sounds. No murmur heard.  Pulmonary:      Effort: Pulmonary effort is normal.      Breath sounds: Normal breath sounds. No wheezing.   Skin:     General: Skin is warm and  dry.   Neurological:      Mental Status: She is alert and oriented to person, place, and time.         Assessment and Plan:     1. Chest tightness (Primary)  2. Acute cough    Patient declined EKG stating she was just seen by Cardiology   Emphasized the importance of monitoring symptoms and reporting any worsening or new concerns.  Refilled albuterol   ER if chest pain begins or breathing worsens     - X-Ray Chest PA And Lateral; Future    3. CML (chronic myelocytic leukemia)  4. Drug-induced immunodeficiency    Chronic, stable, continue current medication          This note was generated with the assistance of ambient listening technology. Verbal consent was obtained by the patient and accompanying visitor(s) for the recording of patient appointment to facilitate this note. I attest to having reviewed and edited the generated note for accuracy, though some syntax or spelling errors may persist. Please contact the author of this note for any clarification.

## 2024-10-23 ENCOUNTER — LAB VISIT (OUTPATIENT)
Dept: LAB | Facility: HOSPITAL | Age: 49
End: 2024-10-23
Attending: INTERNAL MEDICINE
Payer: COMMERCIAL

## 2024-10-23 DIAGNOSIS — C92.10 CML (CHRONIC MYELOCYTIC LEUKEMIA): ICD-10-CM

## 2024-10-23 LAB
ALBUMIN SERPL BCP-MCNC: 3.8 G/DL (ref 3.5–5.2)
ALP SERPL-CCNC: 55 U/L (ref 40–150)
ALT SERPL W/O P-5'-P-CCNC: 38 U/L (ref 10–44)
ANION GAP SERPL CALC-SCNC: 6 MMOL/L (ref 8–16)
AST SERPL-CCNC: 22 U/L (ref 10–40)
BASOPHILS # BLD AUTO: 0.01 K/UL (ref 0–0.2)
BASOPHILS NFR BLD: 0.2 % (ref 0–1.9)
BILIRUB SERPL-MCNC: 0.4 MG/DL (ref 0.1–1)
BUN SERPL-MCNC: 11 MG/DL (ref 6–20)
CALCIUM SERPL-MCNC: 8.5 MG/DL (ref 8.7–10.5)
CHLORIDE SERPL-SCNC: 107 MMOL/L (ref 95–110)
CO2 SERPL-SCNC: 26 MMOL/L (ref 23–29)
CREAT SERPL-MCNC: 0.8 MG/DL (ref 0.5–1.4)
DIFFERENTIAL METHOD BLD: ABNORMAL
EOSINOPHIL # BLD AUTO: 0.1 K/UL (ref 0–0.5)
EOSINOPHIL NFR BLD: 1.7 % (ref 0–8)
ERYTHROCYTE [DISTWIDTH] IN BLOOD BY AUTOMATED COUNT: 13.1 % (ref 11.5–14.5)
EST. GFR  (NO RACE VARIABLE): >60 ML/MIN/1.73 M^2
GLUCOSE SERPL-MCNC: 89 MG/DL (ref 70–110)
HCT VFR BLD AUTO: 36.8 % (ref 37–48.5)
HGB BLD-MCNC: 11.8 G/DL (ref 12–16)
IMM GRANULOCYTES # BLD AUTO: 0.01 K/UL (ref 0–0.04)
IMM GRANULOCYTES NFR BLD AUTO: 0.2 % (ref 0–0.5)
LYMPHOCYTES # BLD AUTO: 1.9 K/UL (ref 1–4.8)
LYMPHOCYTES NFR BLD: 31.1 % (ref 18–48)
MAGNESIUM SERPL-MCNC: 2.1 MG/DL (ref 1.6–2.6)
MCH RBC QN AUTO: 31.1 PG (ref 27–31)
MCHC RBC AUTO-ENTMCNC: 32.1 G/DL (ref 32–36)
MCV RBC AUTO: 97 FL (ref 82–98)
MONOCYTES # BLD AUTO: 0.3 K/UL (ref 0.3–1)
MONOCYTES NFR BLD: 4.6 % (ref 4–15)
NEUTROPHILS # BLD AUTO: 3.8 K/UL (ref 1.8–7.7)
NEUTROPHILS NFR BLD: 62.2 % (ref 38–73)
NRBC BLD-RTO: 0 /100 WBC
PHOSPHATE SERPL-MCNC: 2.7 MG/DL (ref 2.7–4.5)
PLATELET # BLD AUTO: 168 K/UL (ref 150–450)
PMV BLD AUTO: 10.5 FL (ref 9.2–12.9)
POTASSIUM SERPL-SCNC: 4.2 MMOL/L (ref 3.5–5.1)
PROT SERPL-MCNC: 6.4 G/DL (ref 6–8.4)
RBC # BLD AUTO: 3.79 M/UL (ref 4–5.4)
SODIUM SERPL-SCNC: 139 MMOL/L (ref 136–145)
WBC # BLD AUTO: 6.04 K/UL (ref 3.9–12.7)

## 2024-10-23 PROCEDURE — 85025 COMPLETE CBC W/AUTO DIFF WBC: CPT | Performed by: INTERNAL MEDICINE

## 2024-10-23 PROCEDURE — 84100 ASSAY OF PHOSPHORUS: CPT | Performed by: INTERNAL MEDICINE

## 2024-10-23 PROCEDURE — 83735 ASSAY OF MAGNESIUM: CPT | Performed by: INTERNAL MEDICINE

## 2024-10-23 PROCEDURE — 36415 COLL VENOUS BLD VENIPUNCTURE: CPT | Mod: PN | Performed by: INTERNAL MEDICINE

## 2024-10-23 PROCEDURE — 80053 COMPREHEN METABOLIC PANEL: CPT | Performed by: INTERNAL MEDICINE

## 2024-10-30 ENCOUNTER — LAB VISIT (OUTPATIENT)
Dept: LAB | Facility: HOSPITAL | Age: 49
End: 2024-10-30
Attending: INTERNAL MEDICINE
Payer: COMMERCIAL

## 2024-10-30 DIAGNOSIS — C92.10 CML (CHRONIC MYELOCYTIC LEUKEMIA): ICD-10-CM

## 2024-10-30 LAB
ALBUMIN SERPL BCP-MCNC: 3.9 G/DL (ref 3.5–5.2)
ALP SERPL-CCNC: 55 U/L (ref 40–150)
ALT SERPL W/O P-5'-P-CCNC: 48 U/L (ref 10–44)
ANION GAP SERPL CALC-SCNC: 8 MMOL/L (ref 8–16)
AST SERPL-CCNC: 30 U/L (ref 10–40)
BASOPHILS # BLD AUTO: 0.01 K/UL (ref 0–0.2)
BASOPHILS NFR BLD: 0.2 % (ref 0–1.9)
BILIRUB SERPL-MCNC: 0.3 MG/DL (ref 0.1–1)
BUN SERPL-MCNC: 9 MG/DL (ref 6–20)
CALCIUM SERPL-MCNC: 8.9 MG/DL (ref 8.7–10.5)
CHLORIDE SERPL-SCNC: 107 MMOL/L (ref 95–110)
CO2 SERPL-SCNC: 24 MMOL/L (ref 23–29)
CREAT SERPL-MCNC: 0.7 MG/DL (ref 0.5–1.4)
DIFFERENTIAL METHOD BLD: ABNORMAL
EOSINOPHIL # BLD AUTO: 0.1 K/UL (ref 0–0.5)
EOSINOPHIL NFR BLD: 2.1 % (ref 0–8)
ERYTHROCYTE [DISTWIDTH] IN BLOOD BY AUTOMATED COUNT: 13.2 % (ref 11.5–14.5)
EST. GFR  (NO RACE VARIABLE): >60 ML/MIN/1.73 M^2
GLUCOSE SERPL-MCNC: 86 MG/DL (ref 70–110)
HCT VFR BLD AUTO: 36.5 % (ref 37–48.5)
HGB BLD-MCNC: 11.7 G/DL (ref 12–16)
IMM GRANULOCYTES # BLD AUTO: 0.05 K/UL (ref 0–0.04)
IMM GRANULOCYTES NFR BLD AUTO: 1 % (ref 0–0.5)
LYMPHOCYTES # BLD AUTO: 1.8 K/UL (ref 1–4.8)
LYMPHOCYTES NFR BLD: 34.3 % (ref 18–48)
MAGNESIUM SERPL-MCNC: 1.9 MG/DL (ref 1.6–2.6)
MCH RBC QN AUTO: 31.6 PG (ref 27–31)
MCHC RBC AUTO-ENTMCNC: 32.1 G/DL (ref 32–36)
MCV RBC AUTO: 99 FL (ref 82–98)
MONOCYTES # BLD AUTO: 0.3 K/UL (ref 0.3–1)
MONOCYTES NFR BLD: 5.7 % (ref 4–15)
NEUTROPHILS # BLD AUTO: 3 K/UL (ref 1.8–7.7)
NEUTROPHILS NFR BLD: 56.7 % (ref 38–73)
NRBC BLD-RTO: 0 /100 WBC
PHOSPHATE SERPL-MCNC: 2.8 MG/DL (ref 2.7–4.5)
PLATELET # BLD AUTO: 170 K/UL (ref 150–450)
PMV BLD AUTO: 10.5 FL (ref 9.2–12.9)
POTASSIUM SERPL-SCNC: 4 MMOL/L (ref 3.5–5.1)
PROT SERPL-MCNC: 6.4 G/DL (ref 6–8.4)
RBC # BLD AUTO: 3.7 M/UL (ref 4–5.4)
SODIUM SERPL-SCNC: 139 MMOL/L (ref 136–145)
WBC # BLD AUTO: 5.25 K/UL (ref 3.9–12.7)

## 2024-10-30 PROCEDURE — 80053 COMPREHEN METABOLIC PANEL: CPT | Performed by: INTERNAL MEDICINE

## 2024-10-30 PROCEDURE — 36415 COLL VENOUS BLD VENIPUNCTURE: CPT | Mod: PN | Performed by: INTERNAL MEDICINE

## 2024-10-30 PROCEDURE — 83735 ASSAY OF MAGNESIUM: CPT | Performed by: INTERNAL MEDICINE

## 2024-10-30 PROCEDURE — 84100 ASSAY OF PHOSPHORUS: CPT | Performed by: INTERNAL MEDICINE

## 2024-10-30 PROCEDURE — 85025 COMPLETE CBC W/AUTO DIFF WBC: CPT | Performed by: INTERNAL MEDICINE

## 2024-11-06 ENCOUNTER — LAB VISIT (OUTPATIENT)
Dept: LAB | Facility: HOSPITAL | Age: 49
End: 2024-11-06
Attending: INTERNAL MEDICINE
Payer: COMMERCIAL

## 2024-11-06 DIAGNOSIS — C92.10 CML (CHRONIC MYELOCYTIC LEUKEMIA): ICD-10-CM

## 2024-11-06 LAB
ALBUMIN SERPL BCP-MCNC: 4.2 G/DL (ref 3.5–5.2)
ALP SERPL-CCNC: 66 U/L (ref 40–150)
ALT SERPL W/O P-5'-P-CCNC: 59 U/L (ref 10–44)
ANION GAP SERPL CALC-SCNC: 7 MMOL/L (ref 8–16)
AST SERPL-CCNC: 34 U/L (ref 10–40)
BASOPHILS # BLD AUTO: 0.02 K/UL (ref 0–0.2)
BASOPHILS NFR BLD: 0.3 % (ref 0–1.9)
BILIRUB SERPL-MCNC: 0.5 MG/DL (ref 0.1–1)
BUN SERPL-MCNC: 11 MG/DL (ref 6–20)
CALCIUM SERPL-MCNC: 10.4 MG/DL (ref 8.7–10.5)
CHLORIDE SERPL-SCNC: 103 MMOL/L (ref 95–110)
CO2 SERPL-SCNC: 27 MMOL/L (ref 23–29)
CREAT SERPL-MCNC: 0.8 MG/DL (ref 0.5–1.4)
DIFFERENTIAL METHOD BLD: ABNORMAL
EOSINOPHIL # BLD AUTO: 0.1 K/UL (ref 0–0.5)
EOSINOPHIL NFR BLD: 1.3 % (ref 0–8)
ERYTHROCYTE [DISTWIDTH] IN BLOOD BY AUTOMATED COUNT: 13.2 % (ref 11.5–14.5)
EST. GFR  (NO RACE VARIABLE): >60 ML/MIN/1.73 M^2
GLUCOSE SERPL-MCNC: 86 MG/DL (ref 70–110)
HCT VFR BLD AUTO: 39.4 % (ref 37–48.5)
HGB BLD-MCNC: 12.4 G/DL (ref 12–16)
IMM GRANULOCYTES # BLD AUTO: 0.01 K/UL (ref 0–0.04)
IMM GRANULOCYTES NFR BLD AUTO: 0.2 % (ref 0–0.5)
LYMPHOCYTES # BLD AUTO: 1.9 K/UL (ref 1–4.8)
LYMPHOCYTES NFR BLD: 32.5 % (ref 18–48)
MAGNESIUM SERPL-MCNC: 1.9 MG/DL (ref 1.6–2.6)
MCH RBC QN AUTO: 30.9 PG (ref 27–31)
MCHC RBC AUTO-ENTMCNC: 31.5 G/DL (ref 32–36)
MCV RBC AUTO: 98 FL (ref 82–98)
MONOCYTES # BLD AUTO: 0.2 K/UL (ref 0.3–1)
MONOCYTES NFR BLD: 3.9 % (ref 4–15)
NEUTROPHILS # BLD AUTO: 3.7 K/UL (ref 1.8–7.7)
NEUTROPHILS NFR BLD: 61.8 % (ref 38–73)
NRBC BLD-RTO: 0 /100 WBC
PHOSPHATE SERPL-MCNC: 3.8 MG/DL (ref 2.7–4.5)
PLATELET # BLD AUTO: 197 K/UL (ref 150–450)
PMV BLD AUTO: 10.5 FL (ref 9.2–12.9)
POTASSIUM SERPL-SCNC: 4.4 MMOL/L (ref 3.5–5.1)
PROT SERPL-MCNC: 6.8 G/DL (ref 6–8.4)
RBC # BLD AUTO: 4.01 M/UL (ref 4–5.4)
SODIUM SERPL-SCNC: 137 MMOL/L (ref 136–145)
WBC # BLD AUTO: 5.93 K/UL (ref 3.9–12.7)

## 2024-11-06 PROCEDURE — 85025 COMPLETE CBC W/AUTO DIFF WBC: CPT | Performed by: INTERNAL MEDICINE

## 2024-11-06 PROCEDURE — 36415 COLL VENOUS BLD VENIPUNCTURE: CPT | Mod: PN | Performed by: INTERNAL MEDICINE

## 2024-11-06 PROCEDURE — 80053 COMPREHEN METABOLIC PANEL: CPT | Performed by: INTERNAL MEDICINE

## 2024-11-06 PROCEDURE — 83735 ASSAY OF MAGNESIUM: CPT | Performed by: INTERNAL MEDICINE

## 2024-11-06 PROCEDURE — 84100 ASSAY OF PHOSPHORUS: CPT | Performed by: INTERNAL MEDICINE

## 2024-11-08 NOTE — PROGRESS NOTES
Section of Hematology and Stem Cell Transplantation  Follow Up Visit     Date of visit: 24  Visit diagnosis: No primary diagnosis found.    Oncologic History:     Primary Oncologic Diagnosis: Chronic myeloid leukemia, chronic phase    23: Routine labs notable for , Hgb 11.1, Plts 165.  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 Karyotype 46,XX,t(9;22)(q34;q11.2)[20]. BCR/ABL 27%. She had low risk disease by Sokal.   3/26/23: She started imatinib 400mg daily.   23: BCR/ABL at 3 months - 5.5%  23: Imatinib decreased to 300mg daily due to N/V and facial swelling.  23: BCR/ABL at 6 months - 0.4%  10/12/23: Imatinib decreased to 200mg daily due to persistent N/V and facial swelling.  23: BCR/ABL at 9 months - 1.3%  24: Recheck BCR/ABL - 1.0%  3/6/24: BCR/ABL 0.7%  24: BCR/ABL p210 2.8%.     History of Present Ilness:   Audie Sandoval (Audie) is a pleasant 48 y.o.female who presents for follow up regarding CML.  She feels well. Taking dasatinib 100mg daily.   PAST MEDICAL HISTORY:   Past Medical History:   Diagnosis Date    Allergy     Chronic myeloid leukemia 2023    Gestational hypertension        PAST SURGICAL HISTORY:   Past Surgical History:   Procedure Laterality Date     SECTION      TONSILLECTOMY         PAST SOCIAL HISTORY:  Social History     Tobacco Use    Smoking status: Never    Smokeless tobacco: Never   Substance Use Topics    Alcohol use: Yes     Comment: Rare    Drug use: Never       FAMILY HISTORY:  Family History   Problem Relation Name Age of Onset    Abnormal EKG Father      Diabetes Father      Hypertension Mother      Arthritis Mother          DJD spine    Breast cancer Neg Hx      Ovarian cancer Neg Hx      Uterine cancer Neg Hx      Cervical cancer Neg Hx      Cancer Neg Hx      Colon cancer Neg Hx         CURRENT MEDICATIONS:   Current Outpatient Medications   Medication Sig    acyclovir (ZOVIRAX) 400 MG tablet Take 400  mg by mouth 2 (two) times daily.    albuterol (VENTOLIN HFA) 90 mcg/actuation inhaler Inhale 1-2 puffs into the lungs every 6 (six) hours as needed for Wheezing or Shortness of Breath. Rescue    ALBUTEROL, REFILL, INHL     cetirizine (ZYRTEC) 5 MG chewable tablet Take 5 mg by mouth nightly as needed for Allergies. (Patient not taking: Reported on 10/22/2024)    dasatinib (SPRYCEL) 100 mg Take 1 tablet (100 mg total) by mouth once daily.    losartan (COZAAR) 25 MG tablet Take 0.5 tablets (12.5 mg total) by mouth once daily.    mupirocin (BACTROBAN) 2 % ointment Apply topically 2 (two) times daily. (Patient not taking: Reported on 9/17/2024)    ondansetron (ZOFRAN) 4 MG tablet Take 1 tablet (4 mg total) by mouth every 6 (six) hours as needed for Nausea.    tirzepatide (MOUNJARO) 2.5 mg/0.5 mL PnIj Inject 2.5 mg into the skin every 7 days. (Patient not taking: Reported on 10/22/2024)    tirzepatide, weight loss, (ZEPBOUND) 2.5 mg/0.5 mL PnIj Inject 2.5 mg into the skin every 7 days. (Patient not taking: Reported on 10/22/2024)    valACYclovir (VALTREX) 1000 MG tablet Take 1 tablet (1,000 mg total) by mouth 2 (two) times daily. (Patient not taking: Reported on 10/22/2024)     No current facility-administered medications for this visit.       ALLERGIES:   Review of patient's allergies indicates:   Allergen Reactions    Codeine     Wheat containing prod      Gluten sensitivity       Review of Systems:     Pertinent positives and negatives included in the HPI. Otherwise a complete review of systems is negative.    Physical Exam:     There were no vitals filed for this visit.    General: Appears well, NAD  Pulmonary: CTAB, no increased work of breathing, no W/R/C  Cardiovascular: S1S2 normal, RRR, no M/R/G  Abdominal: Soft, NT, ND, BS+, no HSM  Extremities: No C/C/E  Neurological: AAOx4, grossly normal, no focal deficits  Dermatologic: No appreciable rashes or lesions    ECOG Performance Status: (foot note - ECOG PS  provided by Eastern Cooperative Oncology Group) 0 - Asymptomatic    Karnofsky Performance Score:  100%- Normal, No Complaints, No Evidence of Disease    Labs:   Lab Results   Component Value Date    WBC 5.93 11/06/2024    RBC 4.01 11/06/2024    HGB 12.4 11/06/2024    HCT 39.4 11/06/2024    MCV 98 11/06/2024    MCH 30.9 11/06/2024    MCHC 31.5 (L) 11/06/2024    RDW 13.2 11/06/2024     11/06/2024    MPV 10.5 11/06/2024    GRAN 3.7 11/06/2024    GRAN 61.8 11/06/2024    LYMPH 1.9 11/06/2024    LYMPH 32.5 11/06/2024    MONO 0.2 (L) 11/06/2024    MONO 3.9 (L) 11/06/2024    EOS 0.1 11/06/2024    BASO 0.02 11/06/2024    EOSINOPHIL 1.3 11/06/2024    BASOPHIL 0.3 11/06/2024       CMP  Sodium   Date Value Ref Range Status   11/06/2024 137 136 - 145 mmol/L Final     Potassium   Date Value Ref Range Status   11/06/2024 4.4 3.5 - 5.1 mmol/L Final     Chloride   Date Value Ref Range Status   11/06/2024 103 95 - 110 mmol/L Final     CO2   Date Value Ref Range Status   11/06/2024 27 23 - 29 mmol/L Final     Glucose   Date Value Ref Range Status   11/06/2024 86 70 - 110 mg/dL Final     BUN   Date Value Ref Range Status   11/06/2024 11 6 - 20 mg/dL Final     Creatinine   Date Value Ref Range Status   11/06/2024 0.8 0.5 - 1.4 mg/dL Final     Calcium   Date Value Ref Range Status   11/06/2024 10.4 8.7 - 10.5 mg/dL Final     Total Protein   Date Value Ref Range Status   11/06/2024 6.8 6.0 - 8.4 g/dL Final     Albumin   Date Value Ref Range Status   11/06/2024 4.2 3.5 - 5.2 g/dL Final     Total Bilirubin   Date Value Ref Range Status   11/06/2024 0.5 0.1 - 1.0 mg/dL Final     Comment:     For infants and newborns, interpretation of results should be based  on gestational age, weight and in agreement with clinical  observations.    Premature Infant recommended reference ranges:  Up to 24 hours.............<8.0 mg/dL  Up to 48 hours............<12.0 mg/dL  3-5 days..................<15.0 mg/dL  6-29 days.................<15.0 mg/dL        Alkaline Phosphatase   Date Value Ref Range Status   11/06/2024 66 40 - 150 U/L Final     AST   Date Value Ref Range Status   11/06/2024 34 10 - 40 U/L Final     ALT   Date Value Ref Range Status   11/06/2024 59 (H) 10 - 44 U/L Final     Anion Gap   Date Value Ref Range Status   11/06/2024 7 (L) 8 - 16 mmol/L Final     eGFR if    Date Value Ref Range Status   12/15/2020 >60.0 >60 mL/min/1.73 m^2 Final     eGFR if non    Date Value Ref Range Status   12/15/2020 >60.0 >60 mL/min/1.73 m^2 Final     Comment:     Calculation used to obtain the estimated glomerular filtration  rate (eGFR) is the CKD-EPI equation.          Imaging:   No results found for this or any previous visit (from the past 2160 hours).    Pathology:  Reviewed     Assessment and Plan:   Audie Sandoval (Audie) is a pleasant 48 y.o.female with CP-CML who presents for follow up.    Chronic myeloid leukemia, chronic phase:  She was diagnosed with chronic phase CML on 3/8/23. CG with t(9;22) without additional chromosomal abnormalities. She has low risk disease by Sokal. She started imatinib 400mg daily on 3/26/23, but she had N/V and facial swelling ultimately requiring dose reductions to 200mg daily (since 10/2023). Her BCR/ABL decreased appropriately by 6 months (9/2023) to 0.4%; however, more recently it increased to 1.3%. Repeat on 1/30/24 noted improvement to 1%. Repeat on 3/6/24 revealed an improvement to 0.7%. BCR/ABL now increasing >1 (2.8%), so will send mutation panel to assess for a kinase domain mutation. We discussed next lines of treatment - will start dasatinib.   - Taking dasatinib 100mg daily      Chemotherapy-induced nausea/vomiting:  Zofran as needed.    Iron deficiency:  Iron stores previously notable for mild iron deficiency without anemia.  Iron sat 16% 3 months ago. I believe this may be contributing to her fatigue.  She received Injectafer on 7/25/24. Will repeat iron studies prior to next  visit.     Orders/Follow Up:             Route Chart for Scheduling  BMT Route Chart for Scheduling           Total time of this visit was 40 minutes, including time spent face to face with patient and/or via video/audio, and also in preparing for today's visit for MDM and documentation. (Medical Decision Making, including consideration of possible diagnoses, management options, complex medical record review, review of diagnostic tests and information, consideration and discussion of significant complications based on comorbidities, and discussion with providers involved with the care of the patient). Greater than 50% was spent face to face with the patient counseling and coordinating care.    Hamzah Conteh MD  Malignant Hematology, Stem Cell Transplant, and Cellular Therapy  [facility]  [facility] [place]

## 2024-11-11 ENCOUNTER — OFFICE VISIT (OUTPATIENT)
Dept: HEMATOLOGY/ONCOLOGY | Facility: CLINIC | Age: 49
End: 2024-11-11
Payer: COMMERCIAL

## 2024-11-11 ENCOUNTER — LAB VISIT (OUTPATIENT)
Dept: LAB | Facility: HOSPITAL | Age: 49
End: 2024-11-11
Attending: INTERNAL MEDICINE
Payer: COMMERCIAL

## 2024-11-11 VITALS
DIASTOLIC BLOOD PRESSURE: 66 MMHG | SYSTOLIC BLOOD PRESSURE: 119 MMHG | RESPIRATION RATE: 18 BRPM | HEART RATE: 77 BPM | HEIGHT: 60 IN | BODY MASS INDEX: 36.22 KG/M2 | WEIGHT: 184.5 LBS | OXYGEN SATURATION: 98 % | TEMPERATURE: 98 F

## 2024-11-11 DIAGNOSIS — T45.1X5A CINV (CHEMOTHERAPY-INDUCED NAUSEA AND VOMITING): ICD-10-CM

## 2024-11-11 DIAGNOSIS — C92.10 CML (CHRONIC MYELOID LEUKEMIA): Primary | ICD-10-CM

## 2024-11-11 DIAGNOSIS — R11.2 CINV (CHEMOTHERAPY-INDUCED NAUSEA AND VOMITING): ICD-10-CM

## 2024-11-11 DIAGNOSIS — D50.9 IRON DEFICIENCY ANEMIA, UNSPECIFIED IRON DEFICIENCY ANEMIA TYPE: ICD-10-CM

## 2024-11-11 DIAGNOSIS — C92.10 CML (CHRONIC MYELOCYTIC LEUKEMIA): ICD-10-CM

## 2024-11-11 LAB
ALBUMIN SERPL BCP-MCNC: 4 G/DL (ref 3.5–5.2)
ALP SERPL-CCNC: 58 U/L (ref 40–150)
ALT SERPL W/O P-5'-P-CCNC: 54 U/L (ref 10–44)
ANION GAP SERPL CALC-SCNC: 8 MMOL/L (ref 8–16)
AST SERPL-CCNC: 29 U/L (ref 10–40)
BASOPHILS # BLD AUTO: 0.02 K/UL (ref 0–0.2)
BASOPHILS NFR BLD: 0.4 % (ref 0–1.9)
BILIRUB SERPL-MCNC: 0.4 MG/DL (ref 0.1–1)
BUN SERPL-MCNC: 12 MG/DL (ref 6–20)
CALCIUM SERPL-MCNC: 9.3 MG/DL (ref 8.7–10.5)
CHLORIDE SERPL-SCNC: 109 MMOL/L (ref 95–110)
CO2 SERPL-SCNC: 22 MMOL/L (ref 23–29)
CREAT SERPL-MCNC: 0.8 MG/DL (ref 0.5–1.4)
DIFFERENTIAL METHOD BLD: ABNORMAL
EOSINOPHIL # BLD AUTO: 0.1 K/UL (ref 0–0.5)
EOSINOPHIL NFR BLD: 1.6 % (ref 0–8)
ERYTHROCYTE [DISTWIDTH] IN BLOOD BY AUTOMATED COUNT: 13.2 % (ref 11.5–14.5)
EST. GFR  (NO RACE VARIABLE): >60 ML/MIN/1.73 M^2
FERRITIN SERPL-MCNC: 138 NG/ML (ref 20–300)
GLUCOSE SERPL-MCNC: 93 MG/DL (ref 70–110)
HCT VFR BLD AUTO: 40.2 % (ref 37–48.5)
HGB BLD-MCNC: 12.6 G/DL (ref 12–16)
IMM GRANULOCYTES # BLD AUTO: 0.01 K/UL (ref 0–0.04)
IMM GRANULOCYTES NFR BLD AUTO: 0.2 % (ref 0–0.5)
IRON SERPL-MCNC: 70 UG/DL (ref 30–160)
LYMPHOCYTES # BLD AUTO: 1.8 K/UL (ref 1–4.8)
LYMPHOCYTES NFR BLD: 31.4 % (ref 18–48)
MAGNESIUM SERPL-MCNC: 1.9 MG/DL (ref 1.6–2.6)
MCH RBC QN AUTO: 31.3 PG (ref 27–31)
MCHC RBC AUTO-ENTMCNC: 31.3 G/DL (ref 32–36)
MCV RBC AUTO: 100 FL (ref 82–98)
MONOCYTES # BLD AUTO: 0.3 K/UL (ref 0.3–1)
MONOCYTES NFR BLD: 5.1 % (ref 4–15)
NEUTROPHILS # BLD AUTO: 3.5 K/UL (ref 1.8–7.7)
NEUTROPHILS NFR BLD: 61.3 % (ref 38–73)
NRBC BLD-RTO: 0 /100 WBC
PHOSPHATE SERPL-MCNC: 3.2 MG/DL (ref 2.7–4.5)
PLATELET # BLD AUTO: 195 K/UL (ref 150–450)
PMV BLD AUTO: 10.3 FL (ref 9.2–12.9)
POTASSIUM SERPL-SCNC: 4.2 MMOL/L (ref 3.5–5.1)
PROT SERPL-MCNC: 6.7 G/DL (ref 6–8.4)
RBC # BLD AUTO: 4.02 M/UL (ref 4–5.4)
SATURATED IRON: 21 % (ref 20–50)
SODIUM SERPL-SCNC: 139 MMOL/L (ref 136–145)
TOTAL IRON BINDING CAPACITY: 327 UG/DL (ref 250–450)
TRANSFERRIN SERPL-MCNC: 221 MG/DL (ref 200–375)
WBC # BLD AUTO: 5.67 K/UL (ref 3.9–12.7)

## 2024-11-11 PROCEDURE — 99214 OFFICE O/P EST MOD 30 MIN: CPT | Mod: S$GLB,,, | Performed by: INTERNAL MEDICINE

## 2024-11-11 PROCEDURE — 36415 COLL VENOUS BLD VENIPUNCTURE: CPT | Performed by: INTERNAL MEDICINE

## 2024-11-11 PROCEDURE — 3074F SYST BP LT 130 MM HG: CPT | Mod: CPTII,S$GLB,, | Performed by: INTERNAL MEDICINE

## 2024-11-11 PROCEDURE — 1160F RVW MEDS BY RX/DR IN RCRD: CPT | Mod: CPTII,S$GLB,, | Performed by: INTERNAL MEDICINE

## 2024-11-11 PROCEDURE — 3008F BODY MASS INDEX DOCD: CPT | Mod: CPTII,S$GLB,, | Performed by: INTERNAL MEDICINE

## 2024-11-11 PROCEDURE — 85025 COMPLETE CBC W/AUTO DIFF WBC: CPT | Performed by: INTERNAL MEDICINE

## 2024-11-11 PROCEDURE — 1159F MED LIST DOCD IN RCRD: CPT | Mod: CPTII,S$GLB,, | Performed by: INTERNAL MEDICINE

## 2024-11-11 PROCEDURE — 3044F HG A1C LEVEL LT 7.0%: CPT | Mod: CPTII,S$GLB,, | Performed by: INTERNAL MEDICINE

## 2024-11-11 PROCEDURE — 99999 PR PBB SHADOW E&M-EST. PATIENT-LVL IV: CPT | Mod: PBBFAC,,, | Performed by: INTERNAL MEDICINE

## 2024-11-11 PROCEDURE — 82728 ASSAY OF FERRITIN: CPT | Performed by: INTERNAL MEDICINE

## 2024-11-11 PROCEDURE — 84100 ASSAY OF PHOSPHORUS: CPT | Performed by: INTERNAL MEDICINE

## 2024-11-11 PROCEDURE — 83735 ASSAY OF MAGNESIUM: CPT | Performed by: INTERNAL MEDICINE

## 2024-11-11 PROCEDURE — 80053 COMPREHEN METABOLIC PANEL: CPT | Performed by: INTERNAL MEDICINE

## 2024-11-11 PROCEDURE — 4010F ACE/ARB THERAPY RXD/TAKEN: CPT | Mod: CPTII,S$GLB,, | Performed by: INTERNAL MEDICINE

## 2024-11-11 PROCEDURE — 3078F DIAST BP <80 MM HG: CPT | Mod: CPTII,S$GLB,, | Performed by: INTERNAL MEDICINE

## 2024-11-11 PROCEDURE — 84466 ASSAY OF TRANSFERRIN: CPT | Performed by: INTERNAL MEDICINE

## 2024-11-11 RX ORDER — LORATADINE 10 MG/1
10 TABLET ORAL DAILY
COMMUNITY

## 2024-11-11 NOTE — PROGRESS NOTES
Section of Hematology and Stem Cell Transplantation  Follow Up Visit     Date of visit: 24  Visit diagnosis: No primary diagnosis found.    Oncologic History:     Primary Oncologic Diagnosis: Chronic myeloid leukemia, chronic phase    23: Routine labs notable for , Hgb 11.1, Plts 165.  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 Karyotype 46,XX,t(9;22)(q34;q11.2)[20]. BCR/ABL 27%. She had low risk disease by Sokal.   3/26/23: She started imatinib 400mg daily.   23: BCR/ABL at 3 months - 5.5%  23: Imatinib decreased to 300mg daily due to N/V and facial swelling.  23: BCR/ABL at 6 months - 0.4%  10/12/23: Imatinib decreased to 200mg daily due to persistent N/V and facial swelling.  23: BCR/ABL at 9 months - 1.3%  24: Recheck BCR/ABL - 1.0%  3/6/24: BCR/ABL 0.7%  24: BCR/ABL p210 2.8%.   24: Dasatinib 100mg daily     History of Present Ilness:   Audie Coppola) is a pleasant 48 y.o.female who presents for follow up regarding CML.  She feels well. Taking dasatinib 100mg daily. Overall the side effects are much more manageable than the imatinib. She had a debilitating headache when she first started taking it but that has since resolved. Every morning for about 1 hour she has some nausea (improves with zofran), facial flushing, burning/watery eyes, and a productive cough with clear sputum. Her symptoms then self resolve and do not return until the next morning. Denies any fevers, chills, emesis, diarrhea, MSK pains, LLE, SOB, chest pain, palpitations, or new rashes. She does have an increased amount of comedones on her face and chest which she states is new for her.     PAST MEDICAL HISTORY:   Past Medical History:   Diagnosis Date    Allergy     Chronic myeloid leukemia 2023    Gestational hypertension        PAST SURGICAL HISTORY:   Past Surgical History:   Procedure Laterality Date     SECTION      TONSILLECTOMY         PAST  SOCIAL HISTORY:  Social History     Tobacco Use    Smoking status: Never    Smokeless tobacco: Never   Substance Use Topics    Alcohol use: Yes     Comment: Rare    Drug use: Never       FAMILY HISTORY:  Family History   Problem Relation Name Age of Onset    Abnormal EKG Father      Diabetes Father      Hypertension Mother      Arthritis Mother          DJD spine    Breast cancer Neg Hx      Ovarian cancer Neg Hx      Uterine cancer Neg Hx      Cervical cancer Neg Hx      Cancer Neg Hx      Colon cancer Neg Hx         CURRENT MEDICATIONS:   Current Outpatient Medications   Medication Sig    acyclovir (ZOVIRAX) 400 MG tablet Take 400 mg by mouth 2 (two) times daily.    albuterol (VENTOLIN HFA) 90 mcg/actuation inhaler Inhale 1-2 puffs into the lungs every 6 (six) hours as needed for Wheezing or Shortness of Breath. Rescue    ALBUTEROL, REFILL, INHL     dasatinib (SPRYCEL) 100 mg Take 1 tablet (100 mg total) by mouth once daily.    loratadine (CLARITIN) 10 mg tablet Take 10 mg by mouth once daily.    losartan (COZAAR) 25 MG tablet Take 0.5 tablets (12.5 mg total) by mouth once daily.    mupirocin (BACTROBAN) 2 % ointment Apply topically 2 (two) times daily.    ondansetron (ZOFRAN) 4 MG tablet Take 1 tablet (4 mg total) by mouth every 6 (six) hours as needed for Nausea.    tirzepatide (MOUNJARO) 2.5 mg/0.5 mL PnIj Inject 2.5 mg into the skin every 7 days.    tirzepatide, weight loss, (ZEPBOUND) 2.5 mg/0.5 mL PnIj Inject 2.5 mg into the skin every 7 days.    valACYclovir (VALTREX) 1000 MG tablet Take 1 tablet (1,000 mg total) by mouth 2 (two) times daily.     No current facility-administered medications for this visit.       ALLERGIES:   Review of patient's allergies indicates:   Allergen Reactions    Codeine     Wheat containing prod      Gluten sensitivity       Review of Systems:     Pertinent positives and negatives included in the HPI. Otherwise a complete review of systems is negative.    Physical Exam:      Vitals:    11/11/24 0808   BP: 119/66   Pulse: 77   Resp: 18   Temp: 98.4 °F (36.9 °C)       General: Appears well, NAD  Pulmonary: CTAB, no increased work of breathing, no W/R/C  Cardiovascular: S1S2 normal, RRR, no M/R/G  Abdominal: Soft, NT, ND, BS+, no HSM  Extremities: No C/C/E  Neurological: AAOx4, grossly normal, no focal deficits  Dermatologic: No appreciable rashes or lesions    ECOG Performance Status: (foot note - ECOG PS provided by [employer]cology [employer]) 0 - Asymptomatic    Karnofsky Performance Score:  100%- Normal, No Complaints, No Evidence of Disease    Labs:   Lab Results   Component Value Date    WBC 5.67 11/11/2024    RBC 4.02 11/11/2024    HGB 12.6 11/11/2024    HCT 40.2 11/11/2024     (H) 11/11/2024    MCH 31.3 (H) 11/11/2024    MCHC 31.3 (L) 11/11/2024    RDW 13.2 11/11/2024     11/11/2024    MPV 10.3 11/11/2024    GRAN 3.5 11/11/2024    GRAN 61.3 11/11/2024    LYMPH 1.8 11/11/2024    LYMPH 31.4 11/11/2024    MONO 0.3 11/11/2024    MONO 5.1 11/11/2024    EOS 0.1 11/11/2024    BASO 0.02 11/11/2024    EOSINOPHIL 1.6 11/11/2024    BASOPHIL 0.4 11/11/2024       CMP  Sodium   Date Value Ref Range Status   11/06/2024 137 136 - 145 mmol/L Final     Potassium   Date Value Ref Range Status   11/06/2024 4.4 3.5 - 5.1 mmol/L Final     Chloride   Date Value Ref Range Status   11/06/2024 103 95 - 110 mmol/L Final     CO2   Date Value Ref Range Status   11/06/2024 27 23 - 29 mmol/L Final     Glucose   Date Value Ref Range Status   11/06/2024 86 70 - 110 mg/dL Final     BUN   Date Value Ref Range Status   11/06/2024 11 6 - 20 mg/dL Final     Creatinine   Date Value Ref Range Status   11/06/2024 0.8 0.5 - 1.4 mg/dL Final     Calcium   Date Value Ref Range Status   11/06/2024 10.4 8.7 - 10.5 mg/dL Final     Total Protein   Date Value Ref Range Status   11/06/2024 6.8 6.0 - 8.4 g/dL Final     Albumin   Date Value Ref Range Status   11/06/2024 4.2 3.5 - 5.2 g/dL Final     Total  Bilirubin   Date Value Ref Range Status   11/06/2024 0.5 0.1 - 1.0 mg/dL Final     Comment:     For infants and newborns, interpretation of results should be based  on gestational age, weight and in agreement with clinical  observations.    Premature Infant recommended reference ranges:  Up to 24 hours.............<8.0 mg/dL  Up to 48 hours............<12.0 mg/dL  3-5 days..................<15.0 mg/dL  6-29 days.................<15.0 mg/dL       Alkaline Phosphatase   Date Value Ref Range Status   11/06/2024 66 40 - 150 U/L Final     AST   Date Value Ref Range Status   11/06/2024 34 10 - 40 U/L Final     ALT   Date Value Ref Range Status   11/06/2024 59 (H) 10 - 44 U/L Final     Anion Gap   Date Value Ref Range Status   11/06/2024 7 (L) 8 - 16 mmol/L Final     eGFR if    Date Value Ref Range Status   12/15/2020 >60.0 >60 mL/min/1.73 m^2 Final     eGFR if non    Date Value Ref Range Status   12/15/2020 >60.0 >60 mL/min/1.73 m^2 Final     Comment:     Calculation used to obtain the estimated glomerular filtration  rate (eGFR) is the CKD-EPI equation.          Imaging:   No results found for this or any previous visit (from the past 2160 hours).    Pathology:  Reviewed     Assessment and Plan:   Audie Sandoval (Audie) is a pleasant 48 y.o.female with CP-CML who presents for follow up.    Chronic myeloid leukemia, chronic phase:  She was diagnosed with chronic phase CML on 3/8/23. CG with t(9;22) without additional chromosomal abnormalities. She has low risk disease by Sokal. She started imatinib 400mg daily on 3/26/23, but she had N/V and facial swelling ultimately requiring dose reductions to 200mg daily (since 10/2023). Her BCR/ABL decreased appropriately by 6 months (9/2023) to 0.4%; however, more recently it increased to 1.3%. Repeat on 1/30/24 noted improvement to 1%. Repeat on 3/6/24 revealed an improvement to 0.7%. BCR/ABL now increasing >1 (2.8%), so will send mutation panel  to assess for a kinase domain mutation. We discussed next lines of treatment - will start dasatinib.   - Continue dasatinib 100mg daily  - Take an allergy medicine at night to see if that helps with her morning eye watering/burning   - Follow-up in 3-months with BCR-ABL     Chemotherapy-induced nausea/vomiting:  Zofran as needed.    Iron deficiency:  Iron stores previously notable for mild iron deficiency without anemia.  Iron sat 16% 3 months ago. I believe this may be contributing to her fatigue.  She received Injectafer on 7/25/24.   - Labs in process     Orders/Follow Up:           Route Chart for Scheduling    BMT Chart Routing      Follow up with physician 3 months.   Follow up with MICHELL    Provider visit type    Infusion scheduling note    Injection scheduling note    Labs CBC, CMP, magnesium, phosphorus and BCR/ABL   Scheduling:  Preferred lab:  Lab interval: every 4 weeks     Imaging    Pharmacy appointment    Other referrals                       Total time of this visit was 40 minutes, including time spent face to face with patient and/or via video/audio, and also in preparing for today's visit for MDM and documentation. (Medical Decision Making, including consideration of possible diagnoses, management options, complex medical record review, review of diagnostic tests and information, consideration and discussion of significant complications based on comorbidities, and discussion with providers involved with the care of the patient). Greater than 50% was spent face to face with the patient counseling and coordinating care.    Reginald Carbajal MD  Hematology/Oncology Fellow PGY-V

## 2024-11-11 NOTE — PROGRESS NOTES
Route Chart for Scheduling    BMT Chart Routing      Follow up with physician 2 months. RTC 2 months with labs 1 week prior   Follow up with MICHELL    Provider visit type    Infusion scheduling note    Injection scheduling note    Labs CBC, CMP, BCR/ABL, phosphorus and magnesium   Scheduling:  Preferred lab:  Lab interval:  Labs in 1 month (CBC CMP Mg Phos) and then one week prior to visit in 2 months (CBC CMP Mg Phos BCR/ABL)   Imaging    Pharmacy appointment    Other referrals

## 2024-12-05 NOTE — TELEPHONE ENCOUNTER
Called Ms. Sandoval today to follow up on a portal message sent to her recently regarding an [facility] research study she qualifies to participate in. Discussed study. She expressed that she is not interested in participating at this time.

## 2024-12-09 ENCOUNTER — LAB VISIT (OUTPATIENT)
Dept: LAB | Facility: HOSPITAL | Age: 49
End: 2024-12-09
Payer: COMMERCIAL

## 2024-12-09 DIAGNOSIS — C92.10 CML (CHRONIC MYELOCYTIC LEUKEMIA): ICD-10-CM

## 2024-12-09 LAB
ALBUMIN SERPL BCP-MCNC: 3.8 G/DL (ref 3.5–5.2)
ALP SERPL-CCNC: 58 U/L (ref 40–150)
ALT SERPL W/O P-5'-P-CCNC: 31 U/L (ref 10–44)
ANION GAP SERPL CALC-SCNC: 9 MMOL/L (ref 8–16)
AST SERPL-CCNC: 21 U/L (ref 10–40)
BASOPHILS # BLD AUTO: 0.02 K/UL (ref 0–0.2)
BASOPHILS NFR BLD: 0.4 % (ref 0–1.9)
BILIRUB SERPL-MCNC: 0.4 MG/DL (ref 0.1–1)
BUN SERPL-MCNC: 12 MG/DL (ref 6–20)
CALCIUM SERPL-MCNC: 9 MG/DL (ref 8.7–10.5)
CHLORIDE SERPL-SCNC: 108 MMOL/L (ref 95–110)
CO2 SERPL-SCNC: 22 MMOL/L (ref 23–29)
CREAT SERPL-MCNC: 0.7 MG/DL (ref 0.5–1.4)
DIFFERENTIAL METHOD BLD: ABNORMAL
EOSINOPHIL # BLD AUTO: 0.1 K/UL (ref 0–0.5)
EOSINOPHIL NFR BLD: 2.4 % (ref 0–8)
ERYTHROCYTE [DISTWIDTH] IN BLOOD BY AUTOMATED COUNT: 13.8 % (ref 11.5–14.5)
EST. GFR  (NO RACE VARIABLE): >60 ML/MIN/1.73 M^2
GLUCOSE SERPL-MCNC: 88 MG/DL (ref 70–110)
HCT VFR BLD AUTO: 38.3 % (ref 37–48.5)
HGB BLD-MCNC: 12.2 G/DL (ref 12–16)
IMM GRANULOCYTES # BLD AUTO: 0.01 K/UL (ref 0–0.04)
IMM GRANULOCYTES NFR BLD AUTO: 0.2 % (ref 0–0.5)
LYMPHOCYTES # BLD AUTO: 1.6 K/UL (ref 1–4.8)
LYMPHOCYTES NFR BLD: 30.8 % (ref 18–48)
MAGNESIUM SERPL-MCNC: 2 MG/DL (ref 1.6–2.6)
MCH RBC QN AUTO: 31.6 PG (ref 27–31)
MCHC RBC AUTO-ENTMCNC: 31.9 G/DL (ref 32–36)
MCV RBC AUTO: 99 FL (ref 82–98)
MONOCYTES # BLD AUTO: 0.2 K/UL (ref 0.3–1)
MONOCYTES NFR BLD: 4.5 % (ref 4–15)
NEUTROPHILS # BLD AUTO: 3.1 K/UL (ref 1.8–7.7)
NEUTROPHILS NFR BLD: 61.7 % (ref 38–73)
NRBC BLD-RTO: 0 /100 WBC
PHOSPHATE SERPL-MCNC: 2.7 MG/DL (ref 2.7–4.5)
PLATELET # BLD AUTO: 193 K/UL (ref 150–450)
PMV BLD AUTO: 10.9 FL (ref 9.2–12.9)
POTASSIUM SERPL-SCNC: 4.3 MMOL/L (ref 3.5–5.1)
PROT SERPL-MCNC: 6.5 G/DL (ref 6–8.4)
RBC # BLD AUTO: 3.86 M/UL (ref 4–5.4)
SODIUM SERPL-SCNC: 139 MMOL/L (ref 136–145)
WBC # BLD AUTO: 5.06 K/UL (ref 3.9–12.7)

## 2024-12-09 PROCEDURE — 84100 ASSAY OF PHOSPHORUS: CPT | Performed by: INTERNAL MEDICINE

## 2024-12-09 PROCEDURE — 81206 BCR/ABL1 GENE MAJOR BP: CPT | Performed by: INTERNAL MEDICINE

## 2024-12-09 PROCEDURE — 83735 ASSAY OF MAGNESIUM: CPT | Performed by: INTERNAL MEDICINE

## 2024-12-09 PROCEDURE — 36415 COLL VENOUS BLD VENIPUNCTURE: CPT | Mod: PN | Performed by: INTERNAL MEDICINE

## 2024-12-09 PROCEDURE — 85025 COMPLETE CBC W/AUTO DIFF WBC: CPT | Performed by: INTERNAL MEDICINE

## 2024-12-09 PROCEDURE — 80053 COMPREHEN METABOLIC PANEL: CPT | Performed by: INTERNAL MEDICINE

## 2024-12-11 LAB
BCR/ABL,P210 RESULT: NORMAL
PATH REPORT.FINAL DX SPEC: NORMAL
SPECIMEN TYPE: NORMAL

## 2024-12-16 ENCOUNTER — OFFICE VISIT (OUTPATIENT)
Dept: OBSTETRICS AND GYNECOLOGY | Facility: CLINIC | Age: 49
End: 2024-12-16
Payer: COMMERCIAL

## 2024-12-16 VITALS
HEIGHT: 60 IN | WEIGHT: 180.81 LBS | DIASTOLIC BLOOD PRESSURE: 80 MMHG | BODY MASS INDEX: 35.5 KG/M2 | HEART RATE: 82 BPM | SYSTOLIC BLOOD PRESSURE: 126 MMHG

## 2024-12-16 DIAGNOSIS — L70.9 ACNE, UNSPECIFIED ACNE TYPE: ICD-10-CM

## 2024-12-16 DIAGNOSIS — E66.01 SEVERE OBESITY (BMI 35.0-39.9) WITH COMORBIDITY: ICD-10-CM

## 2024-12-16 DIAGNOSIS — C92.10 CML (CHRONIC MYELOCYTIC LEUKEMIA): Primary | ICD-10-CM

## 2024-12-16 PROCEDURE — 3044F HG A1C LEVEL LT 7.0%: CPT | Mod: CPTII,S$GLB,, | Performed by: PHYSICIAN ASSISTANT

## 2024-12-16 PROCEDURE — 3074F SYST BP LT 130 MM HG: CPT | Mod: CPTII,S$GLB,, | Performed by: PHYSICIAN ASSISTANT

## 2024-12-16 PROCEDURE — 3008F BODY MASS INDEX DOCD: CPT | Mod: CPTII,S$GLB,, | Performed by: PHYSICIAN ASSISTANT

## 2024-12-16 PROCEDURE — 1160F RVW MEDS BY RX/DR IN RCRD: CPT | Mod: CPTII,S$GLB,, | Performed by: PHYSICIAN ASSISTANT

## 2024-12-16 PROCEDURE — 1159F MED LIST DOCD IN RCRD: CPT | Mod: CPTII,S$GLB,, | Performed by: PHYSICIAN ASSISTANT

## 2024-12-16 PROCEDURE — 99213 OFFICE O/P EST LOW 20 MIN: CPT | Mod: S$GLB,,, | Performed by: PHYSICIAN ASSISTANT

## 2024-12-16 PROCEDURE — 99999 PR PBB SHADOW E&M-EST. PATIENT-LVL III: CPT | Mod: PBBFAC,,, | Performed by: PHYSICIAN ASSISTANT

## 2024-12-16 PROCEDURE — 4010F ACE/ARB THERAPY RXD/TAKEN: CPT | Mod: CPTII,S$GLB,, | Performed by: PHYSICIAN ASSISTANT

## 2024-12-16 PROCEDURE — 3079F DIAST BP 80-89 MM HG: CPT | Mod: CPTII,S$GLB,, | Performed by: PHYSICIAN ASSISTANT

## 2024-12-16 RX ORDER — TIRZEPATIDE 5 MG/.5ML
5 INJECTION, SOLUTION SUBCUTANEOUS
Start: 2024-12-16

## 2024-12-16 NOTE — PROGRESS NOTES
Subjective:      Audie Sandoval is a 49 y.o. female with history of CML currently on dasatinib 100mg daily who presents for weight loss follow up. She has been taking compounded tirzepatide 2.0mg SC weekly. Increased to 2.5mg in about 1 week ago.  Increase appetite by the end of the week. She continues to eat well and denies skipping meals. Walking for exercise but limited recently due to side effects from dasatinib.   Oncology changed medications for CML and taking dasatinib. Side effects initially but now feeling much better.  LFTs have returned back to normal.  She has in the last month started to have acne breakouts on the jaw and hairline. Questions about hormone levels. Periods are regular. She has had BTL. She is not on hormones.    Routine Screening labs: 2024    Past Medical History:   Diagnosis Date    Allergy     Chronic myeloid leukemia 2023    Gestational hypertension      Past Surgical History:   Procedure Laterality Date     SECTION      TONSILLECTOMY       Social History     Tobacco Use    Smoking status: Never    Smokeless tobacco: Never   Substance Use Topics    Alcohol use: Yes     Comment: Rare    Drug use: Never     Family History   Problem Relation Name Age of Onset    Abnormal EKG Father      Diabetes Father      Hypertension Mother      Arthritis Mother          DJD spine    Breast cancer Neg Hx      Ovarian cancer Neg Hx      Uterine cancer Neg Hx      Cervical cancer Neg Hx      Cancer Neg Hx      Colon cancer Neg Hx       OB History    Para Term  AB Living   4 4 2         SAB IAB Ectopic Multiple Live Births                  # Outcome Date GA Lbr Ward/2nd Weight Sex Type Anes PTL Lv   4 Term            3 Term            2 Para      CS-LTranv      1 Para      CS-LTranv          Current Outpatient Medications:     acyclovir (ZOVIRAX) 400 MG tablet, Take 400 mg by mouth 2 (two) times daily., Disp: , Rfl:     albuterol (VENTOLIN HFA) 90 mcg/actuation inhaler,  Inhale 1-2 puffs into the lungs every 6 (six) hours as needed for Wheezing or Shortness of Breath. Rescue, Disp: 18 g, Rfl: 1    ALBUTEROL, REFILL, INHL, , Disp: , Rfl:     dasatinib (SPRYCEL) 100 mg, Take 1 tablet (100 mg total) by mouth once daily., Disp: 30 tablet, Rfl: 11    loratadine (CLARITIN) 10 mg tablet, Take 10 mg by mouth once daily., Disp: , Rfl:     losartan (COZAAR) 25 MG tablet, Take 0.5 tablets (12.5 mg total) by mouth once daily., Disp: 30 tablet, Rfl: 11    mupirocin (BACTROBAN) 2 % ointment, Apply topically 2 (two) times daily., Disp: 22 g, Rfl: 0    ondansetron (ZOFRAN) 4 MG tablet, Take 1 tablet (4 mg total) by mouth every 6 (six) hours as needed for Nausea., Disp: 30 tablet, Rfl: 11    tirzepatide, weight loss, (ZEPBOUND) 5 mg/0.5 mL PnIj, Inject 5 mg into the skin every 7 days., Disp: , Rfl:     valACYclovir (VALTREX) 1000 MG tablet, Take 1 tablet (1,000 mg total) by mouth 2 (two) times daily., Disp: 30 tablet, Rfl: 6    Review of Systems:  General: No fever, chills.  Chest: No chest pain, shortness of breath, or palpitations.  Breast: No pain, masses, or nipple discharge.  Vulva: No pain, lesions, or itching.  Vagina: No relaxation, itching, discharge, or lesions.  Abdomen: No pain, nausea, vomiting, diarrhea, or constipation.  Urinary: No incontinence, nocturia, frequency, or dysuria.  Extremities:  No leg cramps, edema, or calf pain.  Neurologic: No headaches, dizziness, or visual changes.    Objective:     Vitals:    12/16/24 1025   BP: 126/80   Pulse: 82   Weight: 82 kg (180 lb 12.8 oz)   Height: 5' (1.524 m)   PainSc: 0-No pain     Body mass index is 35.31 kg/m².    PHYSICAL EXAM:  APPEARANCE: Well nourished, well developed, in no acute distress.  AFFECT: WNL, alert and oriented x 3      Assessment:      CML (chronic myelocytic leukemia)    Severe obesity (BMI 35.0-39.9) with comorbidity  -     tirzepatide, weight loss, (ZEPBOUND) 5 mg/0.5 mL PnIj; Inject 5 mg into the skin every 7  days.    Acne, unspecified acne type        Plan:   Continue low glycemic diet with lean protein at each meal.  Maintain hydration.  Continue compounded tirzepatide 2.5mg SC weekly x 4 weeks  Then increase to 3.5mg weekly- faxed to [facility].  Discussed hormonal acne and tx options.   Briefly discussed aldactone.  She would like to see dermatology first and see if resolves in the next 1-2 months.  Discussed option of checking hormone levels today but since having regular, monthly periods do not think is necessary.  Follow up in 3 months.    Instructed patient to call if she experiences any side effects or has any questions.    I spent a total of 25 minutes on the day of the visit.This includes face to face time and non-face to face time preparing to see the patient (eg, review of tests), obtaining and/or reviewing separately obtained history, documenting clinical information in the electronic or other health record, independently interpreting results and communicating results to the patient/family/caregiver, or care coordinator.

## 2025-01-06 ENCOUNTER — LAB VISIT (OUTPATIENT)
Dept: LAB | Facility: HOSPITAL | Age: 50
End: 2025-01-06
Payer: COMMERCIAL

## 2025-01-06 DIAGNOSIS — C92.10 CML (CHRONIC MYELOCYTIC LEUKEMIA): ICD-10-CM

## 2025-01-06 LAB
ALBUMIN SERPL BCP-MCNC: 3.9 G/DL (ref 3.5–5.2)
ALP SERPL-CCNC: 55 U/L (ref 40–150)
ALT SERPL W/O P-5'-P-CCNC: 35 U/L (ref 10–44)
ANION GAP SERPL CALC-SCNC: 9 MMOL/L (ref 8–16)
AST SERPL-CCNC: 21 U/L (ref 10–40)
BASOPHILS # BLD AUTO: 0.01 K/UL (ref 0–0.2)
BASOPHILS NFR BLD: 0.2 % (ref 0–1.9)
BILIRUB SERPL-MCNC: 0.2 MG/DL (ref 0.1–1)
BUN SERPL-MCNC: 11 MG/DL (ref 6–20)
CALCIUM SERPL-MCNC: 9.2 MG/DL (ref 8.7–10.5)
CHLORIDE SERPL-SCNC: 108 MMOL/L (ref 95–110)
CO2 SERPL-SCNC: 23 MMOL/L (ref 23–29)
CREAT SERPL-MCNC: 0.7 MG/DL (ref 0.5–1.4)
DIFFERENTIAL METHOD BLD: ABNORMAL
EOSINOPHIL # BLD AUTO: 0.1 K/UL (ref 0–0.5)
EOSINOPHIL NFR BLD: 2 % (ref 0–8)
ERYTHROCYTE [DISTWIDTH] IN BLOOD BY AUTOMATED COUNT: 12.9 % (ref 11.5–14.5)
EST. GFR  (NO RACE VARIABLE): >60 ML/MIN/1.73 M^2
GLUCOSE SERPL-MCNC: 88 MG/DL (ref 70–110)
HCT VFR BLD AUTO: 37.9 % (ref 37–48.5)
HGB BLD-MCNC: 12.6 G/DL (ref 12–16)
IMM GRANULOCYTES # BLD AUTO: 0.01 K/UL (ref 0–0.04)
IMM GRANULOCYTES NFR BLD AUTO: 0.2 % (ref 0–0.5)
LYMPHOCYTES # BLD AUTO: 2.5 K/UL (ref 1–4.8)
LYMPHOCYTES NFR BLD: 42.1 % (ref 18–48)
MAGNESIUM SERPL-MCNC: 2.2 MG/DL (ref 1.6–2.6)
MCH RBC QN AUTO: 32.7 PG (ref 27–31)
MCHC RBC AUTO-ENTMCNC: 33.2 G/DL (ref 32–36)
MCV RBC AUTO: 98 FL (ref 82–98)
MONOCYTES # BLD AUTO: 0.3 K/UL (ref 0.3–1)
MONOCYTES NFR BLD: 5.5 % (ref 4–15)
NEUTROPHILS # BLD AUTO: 3 K/UL (ref 1.8–7.7)
NEUTROPHILS NFR BLD: 50 % (ref 38–73)
NRBC BLD-RTO: 0 /100 WBC
PHOSPHATE SERPL-MCNC: 2.9 MG/DL (ref 2.7–4.5)
PLATELET # BLD AUTO: 161 K/UL (ref 150–450)
PMV BLD AUTO: 11 FL (ref 9.2–12.9)
POTASSIUM SERPL-SCNC: 4.3 MMOL/L (ref 3.5–5.1)
PROT SERPL-MCNC: 6.7 G/DL (ref 6–8.4)
RBC # BLD AUTO: 3.85 M/UL (ref 4–5.4)
SODIUM SERPL-SCNC: 140 MMOL/L (ref 136–145)
WBC # BLD AUTO: 6.03 K/UL (ref 3.9–12.7)

## 2025-01-06 PROCEDURE — 80053 COMPREHEN METABOLIC PANEL: CPT | Performed by: INTERNAL MEDICINE

## 2025-01-06 PROCEDURE — 83735 ASSAY OF MAGNESIUM: CPT | Performed by: INTERNAL MEDICINE

## 2025-01-06 PROCEDURE — 85025 COMPLETE CBC W/AUTO DIFF WBC: CPT | Performed by: INTERNAL MEDICINE

## 2025-01-06 PROCEDURE — 84100 ASSAY OF PHOSPHORUS: CPT | Performed by: INTERNAL MEDICINE

## 2025-01-06 PROCEDURE — 81206 BCR/ABL1 GENE MAJOR BP: CPT | Performed by: INTERNAL MEDICINE

## 2025-01-08 LAB
BCR/ABL,P210 RESULT: NORMAL
PATH REPORT.FINAL DX SPEC: NORMAL
SPECIMEN TYPE: NORMAL

## 2025-01-09 ENCOUNTER — PATIENT MESSAGE (OUTPATIENT)
Dept: HEMATOLOGY/ONCOLOGY | Facility: CLINIC | Age: 50
End: 2025-01-09
Payer: COMMERCIAL

## 2025-01-29 ENCOUNTER — PATIENT MESSAGE (OUTPATIENT)
Dept: HEMATOLOGY/ONCOLOGY | Facility: CLINIC | Age: 50
End: 2025-01-29
Payer: COMMERCIAL

## 2025-01-30 ENCOUNTER — LAB VISIT (OUTPATIENT)
Dept: LAB | Facility: HOSPITAL | Age: 50
End: 2025-01-30
Attending: INTERNAL MEDICINE
Payer: COMMERCIAL

## 2025-01-30 DIAGNOSIS — C92.10 CML (CHRONIC MYELOCYTIC LEUKEMIA): ICD-10-CM

## 2025-01-30 LAB
ALBUMIN SERPL BCP-MCNC: 4.2 G/DL (ref 3.5–5.2)
ALP SERPL-CCNC: 57 U/L (ref 40–150)
ALT SERPL W/O P-5'-P-CCNC: 38 U/L (ref 10–44)
ANION GAP SERPL CALC-SCNC: 8 MMOL/L (ref 8–16)
AST SERPL-CCNC: 28 U/L (ref 10–40)
BASOPHILS # BLD AUTO: 0.01 K/UL (ref 0–0.2)
BASOPHILS NFR BLD: 0.2 % (ref 0–1.9)
BILIRUB SERPL-MCNC: 0.3 MG/DL (ref 0.1–1)
BUN SERPL-MCNC: 10 MG/DL (ref 6–20)
CALCIUM SERPL-MCNC: 11.1 MG/DL (ref 8.7–10.5)
CHLORIDE SERPL-SCNC: 103 MMOL/L (ref 95–110)
CO2 SERPL-SCNC: 25 MMOL/L (ref 23–29)
CREAT SERPL-MCNC: 0.8 MG/DL (ref 0.5–1.4)
DIFFERENTIAL METHOD BLD: ABNORMAL
EOSINOPHIL # BLD AUTO: 0.1 K/UL (ref 0–0.5)
EOSINOPHIL NFR BLD: 1.7 % (ref 0–8)
ERYTHROCYTE [DISTWIDTH] IN BLOOD BY AUTOMATED COUNT: 12.5 % (ref 11.5–14.5)
EST. GFR  (NO RACE VARIABLE): >60 ML/MIN/1.73 M^2
GLUCOSE SERPL-MCNC: 83 MG/DL (ref 70–110)
HCT VFR BLD AUTO: 38.9 % (ref 37–48.5)
HGB BLD-MCNC: 12.8 G/DL (ref 12–16)
IMM GRANULOCYTES # BLD AUTO: 0.01 K/UL (ref 0–0.04)
IMM GRANULOCYTES NFR BLD AUTO: 0.2 % (ref 0–0.5)
LYMPHOCYTES # BLD AUTO: 2 K/UL (ref 1–4.8)
LYMPHOCYTES NFR BLD: 33.8 % (ref 18–48)
MAGNESIUM SERPL-MCNC: 1.9 MG/DL (ref 1.6–2.6)
MCH RBC QN AUTO: 31.8 PG (ref 27–31)
MCHC RBC AUTO-ENTMCNC: 32.9 G/DL (ref 32–36)
MCV RBC AUTO: 97 FL (ref 82–98)
MONOCYTES # BLD AUTO: 0.3 K/UL (ref 0.3–1)
MONOCYTES NFR BLD: 4.8 % (ref 4–15)
NEUTROPHILS # BLD AUTO: 3.4 K/UL (ref 1.8–7.7)
NEUTROPHILS NFR BLD: 59.3 % (ref 38–73)
NRBC BLD-RTO: 0 /100 WBC
PHOSPHATE SERPL-MCNC: 3.8 MG/DL (ref 2.7–4.5)
PLATELET # BLD AUTO: 224 K/UL (ref 150–450)
PMV BLD AUTO: 10.7 FL (ref 9.2–12.9)
POTASSIUM SERPL-SCNC: 4.6 MMOL/L (ref 3.5–5.1)
PROT SERPL-MCNC: 7.5 G/DL (ref 6–8.4)
RBC # BLD AUTO: 4.02 M/UL (ref 4–5.4)
SODIUM SERPL-SCNC: 136 MMOL/L (ref 136–145)
WBC # BLD AUTO: 5.8 K/UL (ref 3.9–12.7)

## 2025-01-30 PROCEDURE — 84100 ASSAY OF PHOSPHORUS: CPT | Performed by: INTERNAL MEDICINE

## 2025-01-30 PROCEDURE — 81206 BCR/ABL1 GENE MAJOR BP: CPT | Performed by: INTERNAL MEDICINE

## 2025-01-30 PROCEDURE — 83735 ASSAY OF MAGNESIUM: CPT | Performed by: INTERNAL MEDICINE

## 2025-01-30 PROCEDURE — 80053 COMPREHEN METABOLIC PANEL: CPT | Performed by: INTERNAL MEDICINE

## 2025-01-30 PROCEDURE — 85025 COMPLETE CBC W/AUTO DIFF WBC: CPT | Performed by: INTERNAL MEDICINE

## 2025-01-30 PROCEDURE — 36415 COLL VENOUS BLD VENIPUNCTURE: CPT | Mod: PN | Performed by: INTERNAL MEDICINE

## 2025-02-03 LAB
BCR/ABL,P210 RESULT: NORMAL
PATH REPORT.FINAL DX SPEC: NORMAL
SPECIMEN TYPE: NORMAL

## 2025-02-24 ENCOUNTER — PATIENT MESSAGE (OUTPATIENT)
Dept: HEMATOLOGY/ONCOLOGY | Facility: CLINIC | Age: 50
End: 2025-02-24
Payer: COMMERCIAL

## 2025-03-05 ENCOUNTER — PATIENT MESSAGE (OUTPATIENT)
Dept: ADMINISTRATIVE | Facility: OTHER | Age: 50
End: 2025-03-05
Payer: COMMERCIAL

## 2025-03-06 ENCOUNTER — TELEPHONE (OUTPATIENT)
Dept: HEMATOLOGY/ONCOLOGY | Facility: CLINIC | Age: 50
End: 2025-03-06
Payer: COMMERCIAL

## 2025-03-06 NOTE — TELEPHONE ENCOUNTER
----- Message from Renetta sent at 3/6/2025  2:32 PM CST -----  Regarding: Change  provider  Contact: Pt @ [phone]  Pt is calling to speak with someone in the office to ask if appt can be with Dr. Conteh not with [facility]. Pt would also like labs to be a sooner than 3/19. Please c/b to advise..thanks

## 2025-03-11 DIAGNOSIS — T45.1X5A CINV (CHEMOTHERAPY-INDUCED NAUSEA AND VOMITING): ICD-10-CM

## 2025-03-11 DIAGNOSIS — R11.2 CINV (CHEMOTHERAPY-INDUCED NAUSEA AND VOMITING): ICD-10-CM

## 2025-03-11 RX ORDER — ONDANSETRON 4 MG/1
4 TABLET, FILM COATED ORAL EVERY 6 HOURS PRN
Qty: 30 TABLET | Refills: 11 | Status: SHIPPED | OUTPATIENT
Start: 2025-03-11

## 2025-03-19 ENCOUNTER — LAB VISIT (OUTPATIENT)
Dept: LAB | Facility: HOSPITAL | Age: 50
End: 2025-03-19
Attending: INTERNAL MEDICINE
Payer: COMMERCIAL

## 2025-03-19 DIAGNOSIS — C92.10 CML (CHRONIC MYELOCYTIC LEUKEMIA): ICD-10-CM

## 2025-03-19 LAB
ALBUMIN SERPL BCP-MCNC: 3.7 G/DL (ref 3.5–5.2)
ALP SERPL-CCNC: 53 U/L (ref 40–150)
ALT SERPL W/O P-5'-P-CCNC: 33 U/L (ref 10–44)
ANION GAP SERPL CALC-SCNC: 6 MMOL/L (ref 8–16)
AST SERPL-CCNC: 21 U/L (ref 10–40)
BASOPHILS # BLD AUTO: 0.01 K/UL (ref 0–0.2)
BASOPHILS NFR BLD: 0.2 % (ref 0–1.9)
BILIRUB SERPL-MCNC: 0.2 MG/DL (ref 0.1–1)
BUN SERPL-MCNC: 12 MG/DL (ref 6–20)
CALCIUM SERPL-MCNC: 9.4 MG/DL (ref 8.7–10.5)
CHLORIDE SERPL-SCNC: 109 MMOL/L (ref 95–110)
CO2 SERPL-SCNC: 23 MMOL/L (ref 23–29)
CREAT SERPL-MCNC: 0.7 MG/DL (ref 0.5–1.4)
DIFFERENTIAL METHOD BLD: ABNORMAL
EOSINOPHIL # BLD AUTO: 0.1 K/UL (ref 0–0.5)
EOSINOPHIL NFR BLD: 1.4 % (ref 0–8)
ERYTHROCYTE [DISTWIDTH] IN BLOOD BY AUTOMATED COUNT: 13.6 % (ref 11.5–14.5)
EST. GFR  (NO RACE VARIABLE): >60 ML/MIN/1.73 M^2
GLUCOSE SERPL-MCNC: 88 MG/DL (ref 70–110)
HCT VFR BLD AUTO: 35.1 % (ref 37–48.5)
HGB BLD-MCNC: 11.5 G/DL (ref 12–16)
IMM GRANULOCYTES # BLD AUTO: 0.01 K/UL (ref 0–0.04)
IMM GRANULOCYTES NFR BLD AUTO: 0.2 % (ref 0–0.5)
LYMPHOCYTES # BLD AUTO: 1.9 K/UL (ref 1–4.8)
LYMPHOCYTES NFR BLD: 33.5 % (ref 18–48)
MAGNESIUM SERPL-MCNC: 2 MG/DL (ref 1.6–2.6)
MCH RBC QN AUTO: 31.9 PG (ref 27–31)
MCHC RBC AUTO-ENTMCNC: 32.8 G/DL (ref 32–36)
MCV RBC AUTO: 97 FL (ref 82–98)
MONOCYTES # BLD AUTO: 0.3 K/UL (ref 0.3–1)
MONOCYTES NFR BLD: 4.9 % (ref 4–15)
NEUTROPHILS # BLD AUTO: 3.4 K/UL (ref 1.8–7.7)
NEUTROPHILS NFR BLD: 59.8 % (ref 38–73)
NRBC BLD-RTO: 0 /100 WBC
PHOSPHATE SERPL-MCNC: 3.3 MG/DL (ref 2.7–4.5)
PLATELET # BLD AUTO: 178 K/UL (ref 150–450)
PMV BLD AUTO: 10.5 FL (ref 9.2–12.9)
POTASSIUM SERPL-SCNC: 4.2 MMOL/L (ref 3.5–5.1)
PROT SERPL-MCNC: 6.4 G/DL (ref 6–8.4)
RBC # BLD AUTO: 3.61 M/UL (ref 4–5.4)
SODIUM SERPL-SCNC: 138 MMOL/L (ref 136–145)
WBC # BLD AUTO: 5.7 K/UL (ref 3.9–12.7)

## 2025-03-19 PROCEDURE — 85025 COMPLETE CBC W/AUTO DIFF WBC: CPT | Performed by: INTERNAL MEDICINE

## 2025-03-19 PROCEDURE — 83735 ASSAY OF MAGNESIUM: CPT | Performed by: INTERNAL MEDICINE

## 2025-03-19 PROCEDURE — 84100 ASSAY OF PHOSPHORUS: CPT | Performed by: INTERNAL MEDICINE

## 2025-03-19 PROCEDURE — 80053 COMPREHEN METABOLIC PANEL: CPT | Performed by: INTERNAL MEDICINE

## 2025-03-19 PROCEDURE — 81206 BCR/ABL1 GENE MAJOR BP: CPT | Performed by: INTERNAL MEDICINE

## 2025-03-21 LAB
BCR/ABL,P210 RESULT: NORMAL
PATH REPORT.FINAL DX SPEC: NORMAL
SPECIMEN TYPE: NORMAL

## 2025-03-26 NOTE — PROGRESS NOTES
Section of Hematology and Stem Cell Transplantation  Follow Up Visit     Date of visit: 3/27/25  Visit diagnosis: CML (chronic myeloid leukemia) [C92.10]    Oncologic History:     Primary Oncologic Diagnosis: Chronic myeloid leukemia, chronic phase    23: Routine labs notable for , Hgb 11.1, Plts 165.  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 Karyotype 46,XX,t(9;22)(q34;q11.2)[20]. BCR/ABL 27%. She had low risk disease by Sokal.   3/26/23: She started imatinib 400mg daily.   23: BCR/ABL at 3 months - 5.5%  23: Imatinib decreased to 300mg daily due to N/V and facial swelling.  23: BCR/ABL at 6 months - 0.4%  10/12/23: Imatinib decreased to 200mg daily due to persistent N/V and facial swelling.  23: BCR/ABL at 9 months - 1.3%  24: Recheck BCR/ABL - 1.0%  3/6/24: BCR/ABL 0.7%  24: BCR/ABL p210 2.8%.   24: Dasatinib 100mg daily   3/19/25: BCR/ABL p210 at 6 months was 0.03%.    History of Present Ilness:   Audie Sandoval (Audie) is a pleasant 49 y.o.female who presents for follow up regarding CML.  She feels well. She developed a new erythematous rash on her face/neck which comes and goes. No new soaps/lotions. No new dietary changes or medications. Rash is sometimes pruritic.     PAST MEDICAL HISTORY:   Past Medical History:   Diagnosis Date    Allergy     Chronic myeloid leukemia 2023    Gestational hypertension        PAST SURGICAL HISTORY:   Past Surgical History:   Procedure Laterality Date     SECTION      TONSILLECTOMY         PAST SOCIAL HISTORY:  Social History     Tobacco Use    Smoking status: Never    Smokeless tobacco: Never   Substance Use Topics    Alcohol use: Yes     Comment: Rare    Drug use: Never       FAMILY HISTORY:  Family History   Problem Relation Name Age of Onset    Abnormal EKG Father      Diabetes Father      Hypertension Mother      Arthritis Mother          DJD spine    Breast cancer Neg Hx      Ovarian cancer  Neg Hx      Uterine cancer Neg Hx      Cervical cancer Neg Hx      Cancer Neg Hx      Colon cancer Neg Hx         CURRENT MEDICATIONS:   Current Outpatient Medications   Medication Sig    acyclovir (ZOVIRAX) 400 MG tablet Take 400 mg by mouth 2 (two) times daily.    albuterol (VENTOLIN HFA) 90 mcg/actuation inhaler Inhale 1-2 puffs into the lungs every 6 (six) hours as needed for Wheezing or Shortness of Breath. Rescue    ALBUTEROL, REFILL, INHL     dapsone (ACZONE) 7.5 % GlwP SMARTSIG:sparingly Topical Daily    dasatinib (SPRYCEL) 100 mg Take 1 tablet (100 mg total) by mouth once daily.    ivermectin (SOOLANTRA) 1 % Crea Apply topically every evening.    loratadine (CLARITIN) 10 mg tablet Take 10 mg by mouth once daily.    losartan (COZAAR) 25 MG tablet Take 0.5 tablets (12.5 mg total) by mouth once daily.    mupirocin (BACTROBAN) 2 % ointment Apply topically 2 (two) times daily.    ondansetron (ZOFRAN) 4 MG tablet Take 1 tablet (4 mg total) by mouth every 6 (six) hours as needed for Nausea.    tirzepatide, weight loss, (ZEPBOUND) 5 mg/0.5 mL PnIj Inject 5 mg into the skin every 7 days.    triamcinolone acetonide 0.1% (KENALOG) 0.1 % ointment SMARTSIG:In Eye(s) 1-2 Times Daily    valACYclovir (VALTREX) 1000 MG tablet Take 1 tablet (1,000 mg total) by mouth 2 (two) times daily.     No current facility-administered medications for this visit.       ALLERGIES:   Review of patient's allergies indicates:   Allergen Reactions    Codeine     Wheat containing prod      Gluten sensitivity       Review of Systems:     Pertinent positives and negatives included in the HPI. Otherwise a complete review of systems is negative.    Physical Exam:     Vitals:    03/27/25 1354   BP: 120/72   Pulse: 89   Resp: 15   Temp: 98.9 °F (37.2 °C)         General: Appears well, NAD  Pulmonary: CTAB, no increased work of breathing, no W/R/C  Cardiovascular: S1S2 normal, RRR, no M/R/G  Abdominal: Soft, NT, ND, BS+, no HSM  Extremities: No  C/C/E  Neurological: AAOx4, grossly normal, no focal deficits  Dermatologic: No appreciable rashes or lesions    ECOG Performance Status: (foot note - ECOG PS provided by Eastern Cooperative Oncology Group) 0 - Asymptomatic    Karnofsky Performance Score:  100%- Normal, No Complaints, No Evidence of Disease    Labs:   Lab Results   Component Value Date    WBC 5.70 03/19/2025    RBC 3.61 (L) 03/19/2025    HGB 11.5 (L) 03/19/2025    HCT 35.1 (L) 03/19/2025    MCV 97 03/19/2025    MCH 31.9 (H) 03/19/2025    MCHC 32.8 03/19/2025    RDW 13.6 03/19/2025     03/19/2025    MPV 10.5 03/19/2025    GRAN 3.4 03/19/2025    GRAN 59.8 03/19/2025    LYMPH 1.9 03/19/2025    LYMPH 33.5 03/19/2025    MONO 0.3 03/19/2025    MONO 4.9 03/19/2025    EOS 0.1 03/19/2025    BASO 0.01 03/19/2025    EOSINOPHIL 1.4 03/19/2025    BASOPHIL 0.2 03/19/2025       CMP  Sodium   Date Value Ref Range Status   03/19/2025 138 136 - 145 mmol/L Final     Potassium   Date Value Ref Range Status   03/19/2025 4.2 3.5 - 5.1 mmol/L Final     Chloride   Date Value Ref Range Status   03/19/2025 109 95 - 110 mmol/L Final     CO2   Date Value Ref Range Status   03/19/2025 23 23 - 29 mmol/L Final     Glucose   Date Value Ref Range Status   03/19/2025 88 70 - 110 mg/dL Final     BUN   Date Value Ref Range Status   03/19/2025 12 6 - 20 mg/dL Final     Creatinine   Date Value Ref Range Status   03/19/2025 0.7 0.5 - 1.4 mg/dL Final     Calcium   Date Value Ref Range Status   03/19/2025 9.4 8.7 - 10.5 mg/dL Final     Total Protein   Date Value Ref Range Status   03/19/2025 6.4 6.0 - 8.4 g/dL Final     Albumin   Date Value Ref Range Status   03/19/2025 3.7 3.5 - 5.2 g/dL Final     Total Bilirubin   Date Value Ref Range Status   03/19/2025 0.2 0.1 - 1.0 mg/dL Final     Comment:     For infants and newborns, interpretation of results should be based  on gestational age, weight and in agreement with clinical  observations.    Premature Infant recommended reference  ranges:  Up to 24 hours.............<8.0 mg/dL  Up to 48 hours............<12.0 mg/dL  3-5 days..................<15.0 mg/dL  6-29 days.................<15.0 mg/dL       Alkaline Phosphatase   Date Value Ref Range Status   03/19/2025 53 40 - 150 U/L Final     AST   Date Value Ref Range Status   03/19/2025 21 10 - 40 U/L Final     ALT   Date Value Ref Range Status   03/19/2025 33 10 - 44 U/L Final     Anion Gap   Date Value Ref Range Status   03/19/2025 6 (L) 8 - 16 mmol/L Final     eGFR if    Date Value Ref Range Status   12/15/2020 >60.0 >60 mL/min/1.73 m^2 Final     eGFR if non    Date Value Ref Range Status   12/15/2020 >60.0 >60 mL/min/1.73 m^2 Final     Comment:     Calculation used to obtain the estimated glomerular filtration  rate (eGFR) is the CKD-EPI equation.          Imaging:   No results found for this or any previous visit (from the past 2160 hours).    Pathology:  Reviewed     Assessment and Plan:   Audie Sandoval (Audie) is a pleasant 49 y.o.female with CP-CML who presents for follow up.    Chronic myeloid leukemia, chronic phase:  She was diagnosed with chronic phase CML on 3/8/23. CG with t(9;22) without additional chromosomal abnormalities. She has low risk disease by Sokal. She started imatinib 400mg daily on 3/26/23, but she had N/V and facial swelling ultimately requiring dose reductions to 200mg daily (since 10/2023). Her BCR/ABL decreased appropriately by 6 months (9/2023) to 0.4%; however, more recently it increased to 1.3%. Repeat on 1/30/24 noted improvement to 1%. Repeat on 3/6/24 revealed an improvement to 0.7%. BCR/ABL in 9/2024 increased to 2.8%, and kinase domain mutation panel was negative. She was then started on dasatinib 100mg which she is tolerating well. BCR/ABL at 6 months decreased to 0.03%.   - Continue dasatinib 100mg daily  - Follow-up in 3-months with BCR-ABL     Chemotherapy-induced nausea/vomiting:  Zofran as needed.    Iron  deficiency:  Iron stores previously notable for mild iron deficiency without anemia.  Iron sat 16% 3 months ago. I believe this may be contributing to her fatigue.  She received Injectafer on 7/25/24.     Dermatitis:  She was evaluated by a dermatologist previously. No improvement with topical steroids. Will refer to allergy/immunology for evaluation.     Orders/Follow Up:      Orders Placed This Encounter    Ambulatory referral/consult to Allergy       Route Chart for Scheduling    BMT Chart Routing      Follow up with physician 3 months. with me   Follow up with MICHELL    Provider visit type    Infusion scheduling note    Injection scheduling note    Labs CBC, BCR/ABL, CMP, phosphorus and magnesium   Scheduling:  Preferred lab:  Lab interval:  few days prior to visit in 3 months   Imaging    Pharmacy appointment    Other referrals                       Total time of this visit was 35 minutes, including time spent face to face with patient and/or via video/audio, and also in preparing for today's visit for MDM and documentation. (Medical Decision Making, including consideration of possible diagnoses, management options, complex medical record review, review of diagnostic tests and information, consideration and discussion of significant complications based on comorbidities, and discussion with providers involved with the care of the patient). Greater than 50% was spent face to face with the patient counseling and coordinating care.      Hamzah Conteh MD  Malignant Hematology, Stem Cell Transplant, and Cellular Therapy  [facility]  [facility] [place]

## 2025-03-27 ENCOUNTER — OFFICE VISIT (OUTPATIENT)
Dept: HEMATOLOGY/ONCOLOGY | Facility: CLINIC | Age: 50
End: 2025-03-27
Payer: COMMERCIAL

## 2025-03-27 VITALS
HEIGHT: 60 IN | TEMPERATURE: 99 F | OXYGEN SATURATION: 97 % | HEART RATE: 89 BPM | BODY MASS INDEX: 34.43 KG/M2 | WEIGHT: 175.38 LBS | SYSTOLIC BLOOD PRESSURE: 120 MMHG | DIASTOLIC BLOOD PRESSURE: 72 MMHG | RESPIRATION RATE: 15 BRPM

## 2025-03-27 DIAGNOSIS — C92.10 CML (CHRONIC MYELOID LEUKEMIA): Primary | ICD-10-CM

## 2025-03-27 DIAGNOSIS — L30.9 DERMATITIS: ICD-10-CM

## 2025-03-27 PROCEDURE — 99999 PR PBB SHADOW E&M-EST. PATIENT-LVL IV: CPT | Mod: PBBFAC,,, | Performed by: INTERNAL MEDICINE

## 2025-03-27 PROCEDURE — 4010F ACE/ARB THERAPY RXD/TAKEN: CPT | Mod: CPTII,S$GLB,, | Performed by: INTERNAL MEDICINE

## 2025-03-27 PROCEDURE — 3074F SYST BP LT 130 MM HG: CPT | Mod: CPTII,S$GLB,, | Performed by: INTERNAL MEDICINE

## 2025-03-27 PROCEDURE — 99214 OFFICE O/P EST MOD 30 MIN: CPT | Mod: S$GLB,,, | Performed by: INTERNAL MEDICINE

## 2025-03-27 PROCEDURE — 1160F RVW MEDS BY RX/DR IN RCRD: CPT | Mod: CPTII,S$GLB,, | Performed by: INTERNAL MEDICINE

## 2025-03-27 PROCEDURE — 3078F DIAST BP <80 MM HG: CPT | Mod: CPTII,S$GLB,, | Performed by: INTERNAL MEDICINE

## 2025-03-27 PROCEDURE — 3008F BODY MASS INDEX DOCD: CPT | Mod: CPTII,S$GLB,, | Performed by: INTERNAL MEDICINE

## 2025-03-27 PROCEDURE — 1159F MED LIST DOCD IN RCRD: CPT | Mod: CPTII,S$GLB,, | Performed by: INTERNAL MEDICINE

## 2025-03-27 RX ORDER — TRIAMCINOLONE ACETONIDE 1 MG/G
OINTMENT TOPICAL
COMMUNITY
Start: 2025-02-25

## 2025-03-27 RX ORDER — DAPSONE 75 MG/G
GEL TOPICAL
COMMUNITY
Start: 2025-02-27

## 2025-03-27 RX ORDER — IVERMECTIN 10 MG/G
CREAM TOPICAL NIGHTLY
COMMUNITY
Start: 2025-02-27

## 2025-03-31 DIAGNOSIS — C92.10 CML (CHRONIC MYELOID LEUKEMIA): Primary | ICD-10-CM

## 2025-04-01 ENCOUNTER — PATIENT MESSAGE (OUTPATIENT)
Dept: HEMATOLOGY/ONCOLOGY | Facility: CLINIC | Age: 50
End: 2025-04-01
Payer: COMMERCIAL

## 2025-04-15 ENCOUNTER — PATIENT MESSAGE (OUTPATIENT)
Dept: OBSTETRICS AND GYNECOLOGY | Facility: CLINIC | Age: 50
End: 2025-04-15
Payer: COMMERCIAL

## 2025-04-15 DIAGNOSIS — E66.01 SEVERE OBESITY (BMI 35.0-39.9) WITH COMORBIDITY: ICD-10-CM

## 2025-04-15 RX ORDER — TIRZEPATIDE 5 MG/.5ML
5 INJECTION, SOLUTION SUBCUTANEOUS
Start: 2025-04-15

## 2025-04-30 ENCOUNTER — TELEPHONE (OUTPATIENT)
Dept: PHARMACY | Facility: CLINIC | Age: 50
End: 2025-04-30
Payer: COMMERCIAL

## 2025-04-30 NOTE — TELEPHONE ENCOUNTER
[facility]/Population Health Chart Review & Patient Outreach Details For Medication Adherence Project    Reason for Outreach Encounter: 3rd Party payor non-compliance report ([employer], [employer], C, etc)  2.  Patient Outreach Method: Reviewed Patient Chart  3.   Medication in question: losartan   LAST FILLED: 3/12/25 for 90 day supply  Hypertension Medications              losartan (COZAAR) 25 MG tablet Take 0.5 tablets (12.5 mg total) by mouth once daily.              4.  Reviewed and or Updates Made To: Patient Chart  5. Outreach Outcomes and/or actions taken: Patient filled medication and is on track to be adherent

## 2025-06-17 DIAGNOSIS — I10 ESSENTIAL HYPERTENSION: ICD-10-CM

## 2025-06-17 RX ORDER — LOSARTAN POTASSIUM 25 MG/1
TABLET ORAL
Qty: 45 TABLET | Refills: 0 | Status: SHIPPED | OUTPATIENT
Start: 2025-06-17

## 2025-06-17 NOTE — TELEPHONE ENCOUNTER
Audie Sandoval  is requesting a refill authorization.  Brief Assessment and Rationale for Refill:  Approve     Medication Therapy Plan:         Pharmacist review requested: Yes   Extended chart review required: Yes   Comments:     Note composed:1:13 PM 06/17/2025

## 2025-06-17 NOTE — TELEPHONE ENCOUNTER
Refill Routing Note   Medication(s) are not appropriate for processing by [facility] for the following reason(s):        Drug-disease interaction    [facility] action(s):  Defer      Medication Therapy Plan: Drug-Disease: losartan and Dehydration    Pharmacist review requested: Yes     Appointments  past 12m or future 3m with PCP    Date Provider   Last Visit   8/16/2024 Adonay Neri MD   Next Visit   Visit date not found Adonay Neri MD   ED visits in past 90 days: 0        Note composed:1:03 PM 06/17/2025

## 2025-06-17 NOTE — TELEPHONE ENCOUNTER
No care due was identified.  [facility] Embedded Care Due Messages. Reference number: [id number].   6/17/2025 1:00:24 PM CDT

## 2025-06-30 ENCOUNTER — PATIENT MESSAGE (OUTPATIENT)
Dept: HEMATOLOGY/ONCOLOGY | Facility: CLINIC | Age: 50
End: 2025-06-30
Payer: COMMERCIAL

## 2025-06-30 DIAGNOSIS — E61.1 IRON DEFICIENCY: Primary | ICD-10-CM

## 2025-07-03 ENCOUNTER — LAB VISIT (OUTPATIENT)
Dept: LAB | Facility: HOSPITAL | Age: 50
End: 2025-07-03
Attending: INTERNAL MEDICINE
Payer: COMMERCIAL

## 2025-07-03 DIAGNOSIS — C92.10 CML (CHRONIC MYELOID LEUKEMIA): ICD-10-CM

## 2025-07-03 DIAGNOSIS — E61.1 IRON DEFICIENCY: ICD-10-CM

## 2025-07-03 LAB
ABSOLUTE EOSINOPHIL (OHS): 0.13 K/UL
ABSOLUTE MONOCYTE (OHS): 0.25 K/UL (ref 0.3–1)
ABSOLUTE NEUTROPHIL COUNT (OHS): 2.63 K/UL (ref 1.8–7.7)
ALBUMIN SERPL BCP-MCNC: 4 G/DL (ref 3.5–5.2)
ALP SERPL-CCNC: 61 UNIT/L (ref 40–150)
ALT SERPL W/O P-5'-P-CCNC: 28 UNIT/L (ref 10–44)
ANION GAP (OHS): 7 MMOL/L (ref 8–16)
AST SERPL-CCNC: 25 UNIT/L (ref 11–45)
BASOPHILS # BLD AUTO: 0.01 K/UL
BASOPHILS NFR BLD AUTO: 0.2 %
BILIRUB SERPL-MCNC: 0.3 MG/DL (ref 0.1–1)
BUN SERPL-MCNC: 10 MG/DL (ref 6–20)
CALCIUM SERPL-MCNC: 9 MG/DL (ref 8.7–10.5)
CHLORIDE SERPL-SCNC: 107 MMOL/L (ref 95–110)
CO2 SERPL-SCNC: 24 MMOL/L (ref 23–29)
CREAT SERPL-MCNC: 0.8 MG/DL (ref 0.5–1.4)
ERYTHROCYTE [DISTWIDTH] IN BLOOD BY AUTOMATED COUNT: 13.6 % (ref 11.5–14.5)
FERRITIN SERPL-MCNC: 11 NG/ML (ref 20–300)
GFR SERPLBLD CREATININE-BSD FMLA CKD-EPI: >60 ML/MIN/1.73/M2
GLUCOSE SERPL-MCNC: 82 MG/DL (ref 70–110)
HCT VFR BLD AUTO: 33.3 % (ref 37–48.5)
HGB BLD-MCNC: 10.7 GM/DL (ref 12–16)
IMM GRANULOCYTES # BLD AUTO: 0.01 K/UL (ref 0–0.04)
IMM GRANULOCYTES NFR BLD AUTO: 0.2 % (ref 0–0.5)
IRON SATN MFR SERPL: 10 % (ref 20–50)
IRON SERPL-MCNC: 36 UG/DL (ref 30–160)
LYMPHOCYTES # BLD AUTO: 2.17 K/UL (ref 1–4.8)
MAGNESIUM SERPL-MCNC: 1.8 MG/DL (ref 1.6–2.6)
MCH RBC QN AUTO: 30.1 PG (ref 27–31)
MCHC RBC AUTO-ENTMCNC: 32.1 G/DL (ref 32–36)
MCV RBC AUTO: 94 FL (ref 82–98)
NUCLEATED RBC (/100WBC) (OHS): 0 /100 WBC
PHOSPHATE SERPL-MCNC: 3.1 MG/DL (ref 2.7–4.5)
PLATELET # BLD AUTO: 208 K/UL (ref 150–450)
PMV BLD AUTO: 10.5 FL (ref 9.2–12.9)
POTASSIUM SERPL-SCNC: 4.2 MMOL/L (ref 3.5–5.1)
PROT SERPL-MCNC: 6.6 GM/DL (ref 6–8.4)
RBC # BLD AUTO: 3.55 M/UL (ref 4–5.4)
RELATIVE EOSINOPHIL (OHS): 2.5 %
RELATIVE LYMPHOCYTE (OHS): 41.7 % (ref 18–48)
RELATIVE MONOCYTE (OHS): 4.8 % (ref 4–15)
RELATIVE NEUTROPHIL (OHS): 50.6 % (ref 38–73)
SODIUM SERPL-SCNC: 138 MMOL/L (ref 136–145)
TIBC SERPL-MCNC: 373 UG/DL (ref 250–450)
TRANSFERRIN SERPL-MCNC: 252 MG/DL (ref 200–375)
WBC # BLD AUTO: 5.2 K/UL (ref 3.9–12.7)

## 2025-07-03 PROCEDURE — 85025 COMPLETE CBC W/AUTO DIFF WBC: CPT

## 2025-07-03 PROCEDURE — 83735 ASSAY OF MAGNESIUM: CPT

## 2025-07-03 PROCEDURE — 36415 COLL VENOUS BLD VENIPUNCTURE: CPT | Mod: PN

## 2025-07-03 PROCEDURE — 84466 ASSAY OF TRANSFERRIN: CPT

## 2025-07-03 PROCEDURE — 84460 ALANINE AMINO (ALT) (SGPT): CPT

## 2025-07-03 PROCEDURE — 84100 ASSAY OF PHOSPHORUS: CPT

## 2025-07-03 PROCEDURE — 82728 ASSAY OF FERRITIN: CPT

## 2025-07-03 RX ORDER — EPINEPHRINE 0.3 MG/.3ML
0.3 INJECTION SUBCUTANEOUS ONCE AS NEEDED
OUTPATIENT
Start: 2025-07-03

## 2025-07-03 RX ORDER — HEPARIN 100 UNIT/ML
500 SYRINGE INTRAVENOUS
OUTPATIENT
Start: 2025-07-03

## 2025-07-03 RX ORDER — SODIUM CHLORIDE 0.9 % (FLUSH) 0.9 %
10 SYRINGE (ML) INJECTION
OUTPATIENT
Start: 2025-07-03

## 2025-07-14 ENCOUNTER — PATIENT MESSAGE (OUTPATIENT)
Dept: HEMATOLOGY/ONCOLOGY | Facility: CLINIC | Age: 50
End: 2025-07-14
Payer: COMMERCIAL

## 2025-07-14 DIAGNOSIS — C92.10 CML (CHRONIC MYELOID LEUKEMIA): Primary | ICD-10-CM

## 2025-07-15 ENCOUNTER — LAB VISIT (OUTPATIENT)
Dept: LAB | Facility: HOSPITAL | Age: 50
End: 2025-07-15
Payer: COMMERCIAL

## 2025-07-15 ENCOUNTER — OFFICE VISIT (OUTPATIENT)
Dept: HEMATOLOGY/ONCOLOGY | Facility: CLINIC | Age: 50
End: 2025-07-15
Payer: COMMERCIAL

## 2025-07-15 VITALS
DIASTOLIC BLOOD PRESSURE: 75 MMHG | SYSTOLIC BLOOD PRESSURE: 118 MMHG | HEART RATE: 90 BPM | TEMPERATURE: 98 F | BODY MASS INDEX: 33.92 KG/M2 | WEIGHT: 172.75 LBS | OXYGEN SATURATION: 97 % | HEIGHT: 60 IN

## 2025-07-15 DIAGNOSIS — C92.10 CML (CHRONIC MYELOID LEUKEMIA): Primary | ICD-10-CM

## 2025-07-15 DIAGNOSIS — E61.1 IRON DEFICIENCY: ICD-10-CM

## 2025-07-15 DIAGNOSIS — H66.91 RIGHT OTITIS MEDIA, UNSPECIFIED OTITIS MEDIA TYPE: ICD-10-CM

## 2025-07-15 DIAGNOSIS — R11.2 CINV (CHEMOTHERAPY-INDUCED NAUSEA AND VOMITING): ICD-10-CM

## 2025-07-15 DIAGNOSIS — T45.1X5A CINV (CHEMOTHERAPY-INDUCED NAUSEA AND VOMITING): ICD-10-CM

## 2025-07-15 DIAGNOSIS — N92.0 MENORRHAGIA WITH REGULAR CYCLE: ICD-10-CM

## 2025-07-15 DIAGNOSIS — C92.10 CML (CHRONIC MYELOID LEUKEMIA): ICD-10-CM

## 2025-07-15 PROCEDURE — 36415 COLL VENOUS BLD VENIPUNCTURE: CPT

## 2025-07-15 PROCEDURE — 99999 PR PBB SHADOW E&M-EST. PATIENT-LVL IV: CPT | Mod: PBBFAC,,,

## 2025-07-15 PROCEDURE — 81206 BCR/ABL1 GENE MAJOR BP: CPT

## 2025-07-15 RX ORDER — AMOXICILLIN 500 MG/1
500 TABLET, FILM COATED ORAL EVERY 12 HOURS
Qty: 14 TABLET | Refills: 0 | Status: SHIPPED | OUTPATIENT
Start: 2025-07-15 | End: 2025-07-22

## 2025-07-15 NOTE — PROGRESS NOTES
Section of Hematology and Stem Cell Transplantation  Follow Up Visit     Date of visit: 7/15/25  Visit diagnosis: No primary diagnosis found.    Oncologic History:     Primary Oncologic Diagnosis: Chronic myeloid leukemia, chronic phase    23: Routine labs notable for , Hgb 11.1, Plts 165.  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 Karyotype 46,XX,t(9;22)(q34;q11.2)[20]. BCR/ABL 27%. She had low risk disease by Sokal.   3/26/23: She started imatinib 400mg daily.   23: BCR/ABL at 3 months - 5.5%  23: Imatinib decreased to 300mg daily due to N/V and facial swelling.  23: BCR/ABL at 6 months - 0.4%  10/12/23: Imatinib decreased to 200mg daily due to persistent N/V and facial swelling.  23: BCR/ABL at 9 months - 1.3%  24: Recheck BCR/ABL - 1.0%  3/6/24: BCR/ABL 0.7%  24: BCR/ABL p210 2.8%.   24: Dasatinib 100mg daily   3/19/25: BCR/ABL p210 at 6 months was 0.03%.    History of Present Ilness:   Audie Sandoval (Audie) is a pleasant 49 y.o.female who presents for follow up regarding CML.  She feels okay overall. On Friday she started having some right pain inner ear pain ,pain under her jawline, and cheek. No fevers, congestion, rhinorrhea, pharyngitis. She was at a dance convention for child last week. Her skin breakouts have improved since last visit. She is always fatigued but appetite is stable.  She reports since starting dasatinib she has had to take claritin daily due to developing some skin changes/rashes but she does not wish to change medications now due to being to function better daily.     PAST MEDICAL HISTORY:   Past Medical History:   Diagnosis Date    Allergy     Chronic myeloid leukemia 2023    Gestational hypertension        PAST SURGICAL HISTORY:   Past Surgical History:   Procedure Laterality Date     SECTION      TONSILLECTOMY         PAST SOCIAL HISTORY:  Social History     Tobacco Use    Smoking status: Never     Smokeless tobacco: Never   Substance Use Topics    Alcohol use: Yes     Comment: Rare    Drug use: Never       FAMILY HISTORY:  Family History   Problem Relation Name Age of Onset    Abnormal EKG Father      Diabetes Father      Hypertension Mother      Arthritis Mother          DJD spine    Breast cancer Neg Hx      Ovarian cancer Neg Hx      Uterine cancer Neg Hx      Cervical cancer Neg Hx      Cancer Neg Hx      Colon cancer Neg Hx         CURRENT MEDICATIONS:   Current Outpatient Medications   Medication Sig    acyclovir (ZOVIRAX) 400 MG tablet Take 400 mg by mouth 2 (two) times daily.    albuterol (VENTOLIN HFA) 90 mcg/actuation inhaler Inhale 1-2 puffs into the lungs every 6 (six) hours as needed for Wheezing or Shortness of Breath. Rescue    ALBUTEROL, REFILL, INHL     dapsone (ACZONE) 7.5 % GlwP SMARTSIG:sparingly Topical Daily    dasatinib (SPRYCEL) 100 mg Take 1 tablet (100 mg total) by mouth once daily.    ivermectin (SOOLANTRA) 1 % Crea Apply topically every evening.    loratadine (CLARITIN) 10 mg tablet Take 10 mg by mouth once daily.    losartan (COZAAR) 25 MG tablet TAKE 1/2 TABLET(12.5 MG) BY MOUTH DAILY    mupirocin (BACTROBAN) 2 % ointment Apply topically 2 (two) times daily.    ondansetron (ZOFRAN) 4 MG tablet Take 1 tablet (4 mg total) by mouth every 6 (six) hours as needed for Nausea.    tirzepatide, weight loss, (ZEPBOUND) 5 mg/0.5 mL PnIj Inject 5 mg into the skin every 7 days.    triamcinolone acetonide 0.1% (KENALOG) 0.1 % ointment SMARTSIG:In Eye(s) 1-2 Times Daily    valACYclovir (VALTREX) 1000 MG tablet Take 1 tablet (1,000 mg total) by mouth 2 (two) times daily.     No current facility-administered medications for this visit.       ALLERGIES:   Review of patient's allergies indicates:   Allergen Reactions    Codeine     Wheat containing prod      Gluten sensitivity       Review of Systems:     Pertinent positives and negatives included in the HPI. Otherwise a  complete review of systems is negative.    Physical Exam:     Vitals:    07/15/25 0850   BP: 118/75   Pulse: 90   Temp: 97.9 °F (36.6 °C)         General: Appears well, NAD  HEENT: Outer ear is not erythematous but she does have tenderness to auricular tugging. Inner ear canal appears mildly erythematous and eardrum appears hazy. No inflammation or tenderness behind auricle concerning for mastoiditis.   Pulmonary: CTAB, no increased work of breathing, no W/R/C  Cardiovascular: S1S2 normal, RRR, no M/R/G  Abdominal: Soft, NT, ND, BS+, no HSM  Extremities: No C/C/E  Neurological: AAOx4, grossly normal, no focal deficits  Dermatologic: No appreciable rashes or lesions    ECOG Performance Status: (foot note - ECOG PS provided by Eastern Cooperative Oncology Group) 0 - Asymptomatic    Karnofsky Performance Score:  100%- Normal, No Complaints, No Evidence of Disease    Labs:   Lab Results   Component Value Date    WBC 5.20 07/03/2025    RBC 3.55 (L) 07/03/2025    HGB 10.7 (L) 07/03/2025    HCT 33.3 (L) 07/03/2025    MCV 94 07/03/2025    MCH 30.1 07/03/2025    MCHC 32.1 07/03/2025    RDW 13.6 07/03/2025     07/03/2025    MPV 10.5 07/03/2025    GRAN 3.4 03/19/2025    GRAN 59.8 03/19/2025    LYMPH 41.7 07/03/2025    LYMPH 2.17 07/03/2025    MONO 4.8 07/03/2025    MONO 0.25 (L) 07/03/2025    EOS 2.5 07/03/2025    EOS 0.13 07/03/2025    BASO 0.01 03/19/2025    EOSINOPHIL 1.4 03/19/2025    BASOPHIL 0.2 07/03/2025    BASOPHIL 0.01 07/03/2025       CMP  Sodium   Date Value Ref Range Status   07/03/2025 138 136 - 145 mmol/L Final   03/19/2025 138 136 - 145 mmol/L Final     Potassium   Date Value Ref Range Status   07/03/2025 4.2 3.5 - 5.1 mmol/L Final   03/19/2025 4.2 3.5 - 5.1 mmol/L Final     Chloride   Date Value Ref Range Status   07/03/2025 107 95 - 110 mmol/L Final   03/19/2025 109 95 - 110 mmol/L Final     CO2   Date Value Ref Range Status   07/03/2025 24 23 - 29 mmol/L Final   03/19/2025 23 23 - 29 mmol/L Final      Glucose   Date Value Ref Range Status   07/03/2025 82 70 - 110 mg/dL Final   03/19/2025 88 70 - 110 mg/dL Final     BUN   Date Value Ref Range Status   07/03/2025 10 6 - 20 mg/dL Final     Creatinine   Date Value Ref Range Status   07/03/2025 0.8 0.5 - 1.4 mg/dL Final     Calcium   Date Value Ref Range Status   07/03/2025 9.0 8.7 - 10.5 mg/dL Final   03/19/2025 9.4 8.7 - 10.5 mg/dL Final     Protein Total   Date Value Ref Range Status   07/03/2025 6.6 6.0 - 8.4 gm/dL Final     Total Protein   Date Value Ref Range Status   03/19/2025 6.4 6.0 - 8.4 g/dL Final     Albumin   Date Value Ref Range Status   07/03/2025 4.0 3.5 - 5.2 g/dL Final   03/19/2025 3.7 3.5 - 5.2 g/dL Final     Total Bilirubin   Date Value Ref Range Status   03/19/2025 0.2 0.1 - 1.0 mg/dL Final     Comment:     For infants and newborns, interpretation of results should be based  on gestational age, weight and in agreement with clinical  observations.    Premature Infant recommended reference ranges:  Up to 24 hours.............<8.0 mg/dL  Up to 48 hours............<12.0 mg/dL  3-5 days..................<15.0 mg/dL  6-29 days.................<15.0 mg/dL       Bilirubin Total   Date Value Ref Range Status   07/03/2025 0.3 0.1 - 1.0 mg/dL Final     Comment:     For infants and newborns, interpretation of results should be based   on gestational age, weight and in agreement with clinical   observations.    Premature Infant recommended reference ranges:   0-24 hours:  <8.0 mg/dL   24-48 hours: <12.0 mg/dL   3-5 days:    <15.0 mg/dL   6-29 days:   <15.0 mg/dL     Alkaline Phosphatase   Date Value Ref Range Status   03/19/2025 53 40 - 150 U/L Final     ALP   Date Value Ref Range Status   07/03/2025 61 40 - 150 unit/L Final     AST   Date Value Ref Range Status   07/03/2025 25 11 - 45 unit/L Final   03/19/2025 21 10 - 40 U/L Final     ALT   Date Value Ref Range Status   07/03/2025 28 10 - 44 unit/L Final   03/19/2025 33 10 - 44 U/L Final     Anion Gap    Date Value Ref Range Status   07/03/2025 7 (L) 8 - 16 mmol/L Final     eGFR if    Date Value Ref Range Status   12/15/2020 >60.0 >60 mL/min/1.73 m^2 Final     eGFR if non    Date Value Ref Range Status   12/15/2020 >60.0 >60 mL/min/1.73 m^2 Final     Comment:     Calculation used to obtain the estimated glomerular filtration  rate (eGFR) is the CKD-EPI equation.          Imaging:   No results found for this or any previous visit (from the past 2160 hours).    Pathology:  Reviewed     Assessment and Plan:   Audie Sandoval (Audie) is a pleasant 49 y.o.female with CP-CML who presents for follow up.    Chronic myeloid leukemia, chronic phase:  She was diagnosed with chronic phase CML on 3/8/23. CG with t(9;22) without additional chromosomal abnormalities. She has low risk disease by Sokal. She started imatinib 400mg daily on 3/26/23, but she had N/V and facial swelling ultimately requiring dose reductions to 200mg daily (since 10/2023). Her BCR/ABL decreased appropriately by 6 months (9/2023) to 0.4%; however, more recently it increased to 1.3%. Repeat on 1/30/24 noted improvement to 1%. Repeat on 3/6/24 revealed an improvement to 0.7%. BCR/ABL in 9/2024 increased to 2.8%, and kinase domain mutation panel was negative. She was then started on dasatinib 100mg which she is tolerating well. BCR/ABL at 6 months decreased to 0.03%.   - Continue dasatinib 100mg daily  -BCR/ABL today pending.   - Follow-up in 3-months with BCR-ABL     Chemotherapy-induced nausea/vomiting:  Zofran as needed.    Iron deficiency:  Iron stores previously notable for mild iron deficiency without anemia.  Iron sat 16% 3 months ago. I believe this may be contributing to her fatigue.  She received Injectafer on 7/25/24. Patient reports symptoms of menorrhagia and most recent iron sat is 10% and ferritin 11. Will arrange IV iron.     Dermatitis:  She was evaluated by a dermatologist previously. No improvement with  topical steroids. She was referred last visit to allergy/immunology for evaluation. She states the skin breakouts now have improved.     Otitis media  Patient started with inner ear pain on Friday, but now is also experiencing pain along the jaw line and right cheek. Pain is worse to pressure on the ear/tugging on it. Eating and talking does not worsen the pain. No erythema to outer ear but inner ear looks mildly erythematous and ear drum appears hazy. Amoxicillin 500mg BID sent in.     Menorrhagia  Patient states she has been experiencing worsening and heavier periods. This is likely the cause of her iron deficiency. Advised patient to follow up with OBGYN and she has an appointment the end of this month with them.     Orders/Follow Up:             Route Chart for Scheduling    BMT Chart Routing      Follow up with physician    Follow up with MICHELL 3 months. Claribel   Provider visit type    Infusion scheduling note    Injection scheduling note    Labs CBC, CMP, magnesium, phosphorus and BCR/ABL   Scheduling:  Preferred lab:  Lab interval:  a few days before appointment   Imaging    Pharmacy appointment    Other referrals                  Therapy Plan Information  OP IV IRON THERAPY for Iron deficiency, noted on 6/19/2024  Medications  ferumoxytol (Feraheme) 510 mg in D5W 117 mL IVPB  510 mg, Intravenous, 1 time a week  Anaphylaxis/Hypersensitivity  EPINEPHrine (EPIPEN) 0.3 mg/0.3 mL pen injection 0.3 mg  0.3 mg, Intramuscular, PRN  hydrocortisone sodium succinate injection 100 mg  100 mg, Intravenous, PRN  Flushes  0.9% NaCl 250 mL flush bag  Intravenous, Every visit  sodium chloride 0.9% flush 10 mL  10 mL, Intravenous, Every visit  heparin, porcine (PF) 100 unit/mL injection flush 500 Units  500 Units, Intravenous, Every visit  alteplase injection 2 mg  2 mg, Intra-Catheter, Every visit      No therapy plan of the specified type found.    No therapy plan of the specified type found.    Total time of this visit was  35 minutes, including time spent face to face with patient and/or via video/audio, and also in preparing for today's visit for MDM and documentation. (Medical Decision Making, including consideration of possible diagnoses, management options, complex medical record review, review of diagnostic tests and information, consideration and discussion of significant complications based on comorbidities, and discussion with providers involved with the care of the patient). Greater than 50% was spent face to face with the patient counseling and coordinating care.      Hamzah Conteh MD  Malignant Hematology, Stem Cell Transplant, and Cellular Therapy  [facility]  [facility] [place]

## 2025-07-16 ENCOUNTER — OFFICE VISIT (OUTPATIENT)
Dept: INTERNAL MEDICINE | Facility: CLINIC | Age: 50
End: 2025-07-16
Payer: COMMERCIAL

## 2025-07-16 VITALS
SYSTOLIC BLOOD PRESSURE: 122 MMHG | TEMPERATURE: 98 F | DIASTOLIC BLOOD PRESSURE: 76 MMHG | BODY MASS INDEX: 33.76 KG/M2 | OXYGEN SATURATION: 100 % | HEART RATE: 90 BPM | RESPIRATION RATE: 18 BRPM | WEIGHT: 172.81 LBS

## 2025-07-16 DIAGNOSIS — E61.1 IRON DEFICIENCY: ICD-10-CM

## 2025-07-16 DIAGNOSIS — D84.821 DRUG-INDUCED IMMUNODEFICIENCY: ICD-10-CM

## 2025-07-16 DIAGNOSIS — Z79.899 DRUG-INDUCED IMMUNODEFICIENCY: ICD-10-CM

## 2025-07-16 DIAGNOSIS — C92.10 CML (CHRONIC MYELOCYTIC LEUKEMIA): ICD-10-CM

## 2025-07-16 DIAGNOSIS — I10 ESSENTIAL HYPERTENSION: ICD-10-CM

## 2025-07-16 DIAGNOSIS — R51.9 RIGHT SIDED FACIAL PAIN: ICD-10-CM

## 2025-07-16 DIAGNOSIS — H92.01 RIGHT EAR PAIN: Primary | ICD-10-CM

## 2025-07-16 PROCEDURE — 1159F MED LIST DOCD IN RCRD: CPT | Mod: CPTII,S$GLB,, | Performed by: NURSE PRACTITIONER

## 2025-07-16 PROCEDURE — 99999 PR PBB SHADOW E&M-EST. PATIENT-LVL IV: CPT | Mod: PBBFAC,,, | Performed by: NURSE PRACTITIONER

## 2025-07-16 PROCEDURE — 1160F RVW MEDS BY RX/DR IN RCRD: CPT | Mod: CPTII,S$GLB,, | Performed by: NURSE PRACTITIONER

## 2025-07-16 PROCEDURE — 3078F DIAST BP <80 MM HG: CPT | Mod: CPTII,S$GLB,, | Performed by: NURSE PRACTITIONER

## 2025-07-16 PROCEDURE — 99213 OFFICE O/P EST LOW 20 MIN: CPT | Mod: S$GLB,,, | Performed by: NURSE PRACTITIONER

## 2025-07-16 PROCEDURE — 3074F SYST BP LT 130 MM HG: CPT | Mod: CPTII,S$GLB,, | Performed by: NURSE PRACTITIONER

## 2025-07-16 PROCEDURE — 3008F BODY MASS INDEX DOCD: CPT | Mod: CPTII,S$GLB,, | Performed by: NURSE PRACTITIONER

## 2025-07-16 PROCEDURE — 4010F ACE/ARB THERAPY RXD/TAKEN: CPT | Mod: CPTII,S$GLB,, | Performed by: NURSE PRACTITIONER

## 2025-07-16 RX ORDER — VALACYCLOVIR HYDROCHLORIDE 1 G/1
1000 TABLET, FILM COATED ORAL 3 TIMES DAILY
Qty: 21 TABLET | Refills: 0 | Status: SHIPPED | OUTPATIENT
Start: 2025-07-16 | End: 2025-07-23

## 2025-07-16 NOTE — PROGRESS NOTES
Subjective:       Patient ID: Audie Sandoval is a 49 y.o. female.    Chief Complaint: Otalgia (Right side of face and chin area/)    History of Present Illness    CHIEF COMPLAINT:  Ms. Sandoval presents today with ear pain    HISTORY OF PRESENT ILLNESS:  She reports ear and facial pain starting Friday with associated facial irritation. Pain is localized to a specific area of the face, described as constant with no change in intensity. Pain is elicited with ear manipulation. She has a fever blister and a small bump in the affected area. She denies systemic symptoms beyond localized discomfort. She has a scratchy sensation localized to the right side of the throat. Her oncology PA previously prescribed antibiotics which she has not yet started.     MEDICAL HISTORY:  She is immunocompromised with current low RBC count and significantly low iron levels. She reports fatigue attributed to potential iron deficiency and anemia. She has had a tonsillectomy.    ALLERGIES:  She experiences morning episodes of congestion with full red rings around her eyes that resolve with Benadryl. These symptoms appear to be potentially medication-related and hormone-influenced. Recent medication changes have resulted in fewer side effects while awake, though morning allergy symptoms persist.    CURRENT MEDICATIONS:  She takes Sprycel (chemotherapy medication), Valtrex for fever blisters (two pills initially then one at night), daily Claritin, magnesium before sleep, and losartan 12.5mg every day.       ROS:  Constitutional: +fatigue  ENT: +ear pain, +rhinorrhea, +sore throat, +mouth lesions  Neurological: +sleep difficulty  Psychiatric: +emotional lability        Review of patient's allergies indicates:   Allergen Reactions    Codeine     Wheat containing prod      Gluten sensitivity       Medication List with Changes/Refills   New Medications    VALACYCLOVIR (VALTREX) 1000 MG TABLET    Take 1 tablet (1,000 mg total) by mouth 3 (three) times  daily. for 7 days   Current Medications    ACYCLOVIR (ZOVIRAX) 400 MG TABLET    Take 400 mg by mouth 2 (two) times daily.    ALBUTEROL (VENTOLIN HFA) 90 MCG/ACTUATION INHALER    Inhale 1-2 puffs into the lungs every 6 (six) hours as needed for Wheezing or Shortness of Breath. Rescue    ALBUTEROL, REFILL, INHL        AMOXICILLIN (AMOXIL) 500 MG TAB    Take 1 tablet (500 mg total) by mouth every 12 (twelve) hours. for 7 days    DAPSONE (ACZONE) 7.5 % GLWP    SMARTSIG:sparingly Topical Daily    DASATINIB (SPRYCEL) 100 MG    Take 1 tablet (100 mg total) by mouth once daily.    IVERMECTIN (SOOLANTRA) 1 % CREA    Apply topically every evening.    LORATADINE (CLARITIN) 10 MG TABLET    Take 10 mg by mouth once daily.    LOSARTAN (COZAAR) 25 MG TABLET    TAKE 1/2 TABLET(12.5 MG) BY MOUTH DAILY    MUPIROCIN (BACTROBAN) 2 % OINTMENT    Apply topically 2 (two) times daily.    ONDANSETRON (ZOFRAN) 4 MG TABLET    Take 1 tablet (4 mg total) by mouth every 6 (six) hours as needed for Nausea.    TIRZEPATIDE, WEIGHT LOSS, (ZEPBOUND) 5 MG/0.5 ML PNIJ    Inject 5 mg into the skin every 7 days.    TRIAMCINOLONE ACETONIDE 0.1% (KENALOG) 0.1 % OINTMENT    SMARTSIG:In Eye(s) 1-2 Times Daily    VALACYCLOVIR (VALTREX) 1000 MG TABLET    Take 1 tablet (1,000 mg total) by mouth 2 (two) times daily.     Objective:   /76 (BP Location: Left forearm, Patient Position: Sitting)   Pulse 90   Temp 98 °F (36.7 °C) (Temporal)   Resp 18   Wt 78.4 kg (172 lb 13.5 oz)   LMP 07/01/2025   SpO2 100%   BMI 33.76 kg/m²     Physical Exam  Vitals reviewed.   Constitutional:       Appearance: Normal appearance.   HENT:      Head: Normocephalic and atraumatic.        Right Ear: No swelling. Tympanic membrane is bulging. Tympanic membrane is not erythematous.      Left Ear: Tympanic membrane is bulging.      Nose:      Right Sinus: No maxillary sinus tenderness or frontal sinus tenderness.      Left Sinus: No maxillary sinus tenderness or frontal sinus  tenderness.      Mouth/Throat:      Dentition: Gum lesions present.      Tongue: Lesions present.      Pharynx: Postnasal drip present.     Skin:     General: Skin is warm and dry.   Neurological:      Mental Status: She is alert and oriented to person, place, and time.      Cranial Nerves: No facial asymmetry.      Sensory: Sensation is intact.         Assessment and Plan:     Assessment & Plan    Suspect shingles based on symptoms (ear pain, facial discomfort, oral ulceration) despite absence of visible rash.  Immunocompromised status and recent fever blister as contributing factors.  Initiated antihistamine and nasal spray regimen for allergy symptoms and ear fluid.  Advised temporary reduction in BP medication dosage due to low BP and pending iron infusion.    1. Right ear pain (Primary)  2. Right sided facial pain    High suspicion for shingles -- if no improvement on medication f/u for further testing.     - valACYclovir (VALTREX) 1000 MG tablet; Take 1 tablet (1,000 mg total) by mouth 3 (three) times daily. for 7 days  Dispense: 21 tablet; Refill: 0    3. Iron deficiency    Noted the patient's iron deficiency and low erythrocyte count, contributing to fatigue and malaise.  - Attend iron infusion on Friday.    4. Essential hypertension    Chronic, stable  Noted the patient reports feeling hypotensive.  Decreased losartan to 12.5 mg every other day.  Instructed the patient to monitor blood pressure at home and resume full dose of antihypertensive medication if it exceeds 140/90.    5. CML (chronic myelocytic leukemia)  6. Drug-induced immunodeficiency    Chronic, continue current medications, follow up with Heme/Onc    Prescribed Valacyclovir 1 tablet 3 times daily for 7 days for suspected shingles and sent a new prescription to ensure sufficient medication.  Advised the patient to monitor for any new developments.  Recommend salt water gargles for the oral ulcers.          This note was generated with the  assistance of ambient listening technology. Verbal consent was obtained by the patient and accompanying visitor(s) for the recording of patient appointment to facilitate this note. I attest to having reviewed and edited the generated note for accuracy, though some syntax or spelling errors may persist. Please contact the author of this note for any clarification.

## 2025-07-18 ENCOUNTER — PATIENT MESSAGE (OUTPATIENT)
Dept: HEMATOLOGY/ONCOLOGY | Facility: CLINIC | Age: 50
End: 2025-07-18
Payer: COMMERCIAL

## 2025-07-18 LAB
BCR/ABL1 KINASE DOMAIN MUT ANL BLD/T: NORMAL
PATH REPORT.FINAL DX SPEC: NORMAL
SPECIMEN SOURCE: NORMAL

## 2025-07-28 ENCOUNTER — LAB VISIT (OUTPATIENT)
Dept: LAB | Facility: HOSPITAL | Age: 50
End: 2025-07-28
Attending: STUDENT IN AN ORGANIZED HEALTH CARE EDUCATION/TRAINING PROGRAM
Payer: COMMERCIAL

## 2025-07-28 ENCOUNTER — OFFICE VISIT (OUTPATIENT)
Dept: ALLERGY | Facility: CLINIC | Age: 50
End: 2025-07-28
Payer: COMMERCIAL

## 2025-07-28 VITALS
HEART RATE: 88 BPM | HEIGHT: 60 IN | SYSTOLIC BLOOD PRESSURE: 129 MMHG | BODY MASS INDEX: 33.58 KG/M2 | DIASTOLIC BLOOD PRESSURE: 84 MMHG | OXYGEN SATURATION: 98 % | WEIGHT: 171.06 LBS

## 2025-07-28 DIAGNOSIS — R05.3 CHRONIC COUGH: ICD-10-CM

## 2025-07-28 DIAGNOSIS — H04.203 WATERY EYES: ICD-10-CM

## 2025-07-28 DIAGNOSIS — J31.0 CHRONIC RHINITIS: Primary | ICD-10-CM

## 2025-07-28 DIAGNOSIS — J31.0 CHRONIC RHINITIS: ICD-10-CM

## 2025-07-28 DIAGNOSIS — T78.1XXA ADVERSE FOOD REACTION, INITIAL ENCOUNTER: ICD-10-CM

## 2025-07-28 DIAGNOSIS — L30.9 DERMATITIS: ICD-10-CM

## 2025-07-28 LAB — IGE SERPL-ACNC: <21 IU/ML

## 2025-07-28 PROCEDURE — 86003 ALLG SPEC IGE CRUDE XTRC EA: CPT | Mod: 59

## 2025-07-28 PROCEDURE — 1160F RVW MEDS BY RX/DR IN RCRD: CPT | Mod: CPTII,S$GLB,, | Performed by: STUDENT IN AN ORGANIZED HEALTH CARE EDUCATION/TRAINING PROGRAM

## 2025-07-28 PROCEDURE — 3074F SYST BP LT 130 MM HG: CPT | Mod: CPTII,S$GLB,, | Performed by: STUDENT IN AN ORGANIZED HEALTH CARE EDUCATION/TRAINING PROGRAM

## 2025-07-28 PROCEDURE — 3079F DIAST BP 80-89 MM HG: CPT | Mod: CPTII,S$GLB,, | Performed by: STUDENT IN AN ORGANIZED HEALTH CARE EDUCATION/TRAINING PROGRAM

## 2025-07-28 PROCEDURE — 36415 COLL VENOUS BLD VENIPUNCTURE: CPT

## 2025-07-28 PROCEDURE — 4010F ACE/ARB THERAPY RXD/TAKEN: CPT | Mod: CPTII,S$GLB,, | Performed by: STUDENT IN AN ORGANIZED HEALTH CARE EDUCATION/TRAINING PROGRAM

## 2025-07-28 PROCEDURE — 1159F MED LIST DOCD IN RCRD: CPT | Mod: CPTII,S$GLB,, | Performed by: STUDENT IN AN ORGANIZED HEALTH CARE EDUCATION/TRAINING PROGRAM

## 2025-07-28 PROCEDURE — 99417 PROLNG OP E/M EACH 15 MIN: CPT | Mod: S$GLB,,, | Performed by: STUDENT IN AN ORGANIZED HEALTH CARE EDUCATION/TRAINING PROGRAM

## 2025-07-28 PROCEDURE — 82785 ASSAY OF IGE: CPT

## 2025-07-28 PROCEDURE — 99999 PR PBB SHADOW E&M-EST. PATIENT-LVL IV: CPT | Mod: PBBFAC,,, | Performed by: STUDENT IN AN ORGANIZED HEALTH CARE EDUCATION/TRAINING PROGRAM

## 2025-07-28 PROCEDURE — 86003 ALLG SPEC IGE CRUDE XTRC EA: CPT

## 2025-07-28 PROCEDURE — 3008F BODY MASS INDEX DOCD: CPT | Mod: CPTII,S$GLB,, | Performed by: STUDENT IN AN ORGANIZED HEALTH CARE EDUCATION/TRAINING PROGRAM

## 2025-07-28 PROCEDURE — 99205 OFFICE O/P NEW HI 60 MIN: CPT | Mod: S$GLB,,, | Performed by: STUDENT IN AN ORGANIZED HEALTH CARE EDUCATION/TRAINING PROGRAM

## 2025-07-28 RX ORDER — FLUTICASONE PROPIONATE 50 MCG
SPRAY, SUSPENSION (ML) NASAL
Qty: 16 G | Refills: 11 | Status: SHIPPED | OUTPATIENT
Start: 2025-07-28

## 2025-07-28 NOTE — PROGRESS NOTES
ALLERGY & IMMUNOLOGY CLINIC - INITIAL CONSULTATION     HISTORY OF PRESENT ILLNESS     Patient ID: Audie Sandoval is a 49 y.o. female    CC: chronic rhinitis, ocular symptoms, chronic cough, eyelid dermatitis, concern for medication allergy    HPI: Audie Sandoval is a 49 y.o. female with CML and HTN, presenting for chronic rhinitis, ocular symptoms, chronic cough, eyelid dermatitis, with concern for medication allergy.  Patient was referred by Jon Conteh MD (h/o).    Patient says she has a lot going on, and wonders if there could be a unifying reason.    She says she has always coughed a lot, even in childhood. It seems to happen after eating, within 5 minutes of eating. She saw an outside allergist about 5 years ago. She says nothing much came of it. She says she tested positive for some environmental allergies, but that she didn't receive a lot of guidance.     Then 2 years ago, she got diagnosis of CML.  6 months ago, med switched to dasatinib.   She wakes up feeling like a lot of phlegm in chest that causes her to vomit. Watery eyes, swollen around the eyes. No ocular pruritus. Significant rhinorrhea in the morning.   She takes a claritin in the morning, and symptoms improve in about an hour.   When asked what would happen if she didn't take a claritin, she isn't sure.     Eating or drinking anything causes her to cough. Pizza makes it worse. Heavy carbs like a donut or croissant make it worse.   She denies heartburn or reflux.   She says the cough feels like it stems from an irritation in the chest, not the throat.   She recalls albuterol helping at times when cough was worse (like when she had bronchitis), but she hasn't needed it recently (because cough hasn't been as consistent).  In the morning, significant rhinorrhea. But after an hour, nor rhinitis. No rhinorrhea after eating.   The cough is worse in the morning. Sometimes it seems like exertion makes it worse, but fine with exercise.   No  shortness of breath or wheezing.  She recalled taking nexium in the past but it didn't help the cough.   No GI issues with gluten or otherwise.   She has tried eliminating gluten, but it didn't help.     She has also been getting rash around eyes as of 6 months ago. Happening about twice per month. No pain or significant pruritus. It feels rough when it happens.   She saw an outside dermatologist.   She didn't think the topical steroid helped. She uses ivermectin cream.   She says it will go away in 3 days no matter what.   But the benadryl does seem to help. If she takes it at night, by the next day, it looks like it is already getting better. The benadryl doesn't make her drowsy.  She says she wondered if it was hormonal, because it seemed to be happening cyclically, but looking back now, she isn't sure. Maybe only happened once this month. She plans on trying to track it to find out if timing is consistent.      MEDICAL HISTORY     Vaccines:     There is no immunization history on file for this patient.    Medical Hx:   Problem List[1]    Surgical Hx:   Past Surgical History:   Procedure Laterality Date     SECTION      TONSILLECTOMY       Medications:   Medications Ordered Prior to Encounter[2]    H/o Asthma: no but she has an inhaler for her cough  H/o Rhinitis: endorses    Drug Allergies:   Review of patient's allergies indicates:   Allergen Reactions    Codeine     Wheat containing prod      Gluten sensitivity     Env/Occ:   Pets: dog, who sleeps in her bed (but she has symptoms even when she goes out of town)    Social Hx:   Social History[3]    Family Hx:   Family History   Problem Relation Name Age of Onset    Abnormal EKG Father      Diabetes Father      Hypertension Mother      Arthritis Mother          DJD spine    Breast cancer Neg Hx      Ovarian cancer Neg Hx      Uterine cancer Neg Hx      Cervical cancer Neg Hx      Cancer Neg Hx      Colon cancer Neg Hx        PHYSICAL EXAM     VS: BP  129/84 (BP Location: Right arm, Patient Position: Sitting)   Pulse 88   Ht 5' (1.524 m)   Wt 77.6 kg (171 lb 1.2 oz)   LMP 07/01/2025   SpO2 98%   BMI 33.41 kg/m²   GENERAL: Alert, NAD, well-appearing  EYES: EOMI, no conjunctival injection, no discharge, no infraorbital shiners  NOSE: NT 2+ B/L, no stringing mucus, no polyps visualized  ORAL: MMM, no ulcers, no thrush, no cobblestoning  LUNGS: CTAB, no w/r/c, no increased WOB  HEART: RRR, normal S1/S2, no m/g/r  DERM: no rashes     LABORATORY STUDIES     Component      Latest Ref Rng 3/19/2025 7/3/2025   WBC      3.90 - 12.70 K/uL 5.70  5.20    RBC      4.00 - 5.40 M/uL 3.61 (L)  3.55 (L)    Hemoglobin      12.0 - 16.0 gm/dL 11.5 (L)  10.7 (L)    Hematocrit      37.0 - 48.5 % 35.1 (L)  33.3 (L)    MCV      82 - 98 fL 97  94    MCH      27.0 - 31.0 pg 31.9 (H)  30.1    MCHC      32.0 - 36.0 g/dL 32.8  32.1    RDW      11.5 - 14.5 % 13.6  13.6    Platelet Count      150 - 450 K/uL 178  208    MPV      9.2 - 12.9 fL 10.5  10.5    Immature Granulocytes      0.0 - 0.5 % 0.2  0.2    Gran # (ANC)      1.8 - 7.7 K/uL 3.4  2.63    Immature Grans (Abs)      0.00 - 0.04 K/uL 0.01  0.01    Lymph #      1 - 4.8 K/uL 1.9  2.17    Mono #      0.3 - 1 K/uL 0.3  0.25 (L)    Eos #      <=0.5 K/uL 0.1  0.13    Baso #      <=0.2 K/uL 0.01  0.01    Differential Method Automated     Sodium      136 - 145 mmol/L 138  138    Potassium      3.5 - 5.1 mmol/L 4.2  4.2    Chloride      95 - 110 mmol/L 109  107    CO2      23 - 29 mmol/L 23  24    Glucose      70 - 110 mg/dL 88  82    BUN      6 - 20 mg/dL 12  10    Creatinine      0.5 - 1.4 mg/dL 0.7  0.8    Calcium      8.7 - 10.5 mg/dL 9.4  9.0    PROTEIN TOTAL      6.0 - 8.4 gm/dL 6.4  6.6    Albumin      3.5 - 5.2 g/dL 3.7  4.0    BILIRUBIN TOTAL      0.1 - 1.0 mg/dL 0.2  0.3    ALP      40 - 150 unit/L 53  61    AST      11 - 45 unit/L 21  25    ALT      10 - 44 unit/L 33  28       ALLERGEN TESTING     Immunocaps: Ordered      IMAGING & OTHER DIAGNOSTICS     CXR 10/22/24:  FINDINGS:  Cardiac size is normal.  Lungs are clear and well aerated.  No infiltrate or vascular congestion.     CHART REVIEW     Reviewed heme/onc note, IM note, labs, imaging report.      ASSESSMENT & PLAN     Audie Sandoval is a 49 y.o. female with     # Chronic rhinitis and ocular symptoms, worse in morning: Started about 6 months ago, she correlates timing with starting dasatinib nightly for CML (she was switched to dasatinib 10/2025), she has been been waking up with significant rhinorrhea, sensation of mucus in chest that causes her to vomit, and watery/puffy eyes. She takes loratadine every morning and symptoms improve in about an hour (unclear whether symptoms would improve without the loratadine). In regards to whether dasatinib could be the culprit, I counseled that her symptoms are not consistent with IgE-mediated drug allergy. Explained that there is no validated skin test for dasatinib (and even if we chose to do skin testing, it only evaluates for IgE-mediated allergy). Her symptoms don't sound consistent with other mechanisms of drug hypersensitivity reactions. So if dasatinib is culprit, I would think it more likely non-allergic side effects. That being said, her symptoms are consistent with allergic rhinoconjunctivitis triggered by an environmental allergen she is being exposed to over night (such as dog and/or dust mite).   -checking immunocaps for aeroallergens.  -as she has found loratadine helpful but not for full 24 hours, will try adding another antihistamine dose. Continue loratadine 10 mg qAM, and trial adding cetirizine 10 mg qPM.   -start flonase 1-2 SEN BID. Instructed on proper technique.     # Chronic cough: She reports she has had a cough her entire life, but worse in the mornings recently. She has also noticed eating a drinking as triggers for her cough. She says there is no consistent food trigger, but she has found it worse with  wheat/gluten. However, symptoms didn't improve when she did a trial elimination of gluten. Symptoms are not consistent with celiac disease (she denies any GI symptoms) nor IgE-mediated wheat allergy. Counseled that cough after eating/drinking is often due to reflux, but patient denies other reflux symptoms and reports symptoms did not improve with a trial of a PPI. Counseled that cough after eating could also be related to gustatory rhinitis, but patient denies gustatory rhinitis. Patient does recall albuterol helping in the past (which she used at times when her cough was worse), but low suspicion for asthma overall. Offered a trial of asthma controller medication, but patient declined for now.  -checking immunocaps for wheat per patient request. Counseled that a positive result doesn't equate to clinical allergy, and a negative result only essentially rules out IgE-mediated food allergy (not other intolerances to that food).  -evaluating and treating rhinitis as above.  -continue albuterol prn in case of worsening cough.    # Eyelid dermatitis: Occurs once or twice per month. Started about 6 months ago. She has seen an outside dermatologist for this. She did not find topical steroids helpful. She reports it tends to last 3 days no matter what, but using benadryl seems to help (if she takes benadryl at night, the next morning it seems to be improving). Contact dermatitis is on differential, but an antihistamine helping while a topical steroid doesn't help wouldn't be consistent with this diagnosis. Recommend she start nightly cetirizine as above (if there is a histamine-mediated component to this rash, this should help).      Follow up: 1-2 months       Veronica Denise MD  Allergy/Immunology         [1]   Patient Active Problem List  Diagnosis    Essential hypertension    History of 2019 novel coronavirus disease (COVID-19)    CML (chronic myelocytic leukemia)    Dehydration    Severe obesity (BMI 35.0-39.9) with  comorbidity    Drug-induced immunodeficiency    Iron deficiency   [2]   Current Outpatient Medications on File Prior to Visit   Medication Sig Dispense Refill    acyclovir (ZOVIRAX) 400 MG tablet Take 400 mg by mouth 2 (two) times daily.      albuterol (VENTOLIN HFA) 90 mcg/actuation inhaler Inhale 1-2 puffs into the lungs every 6 (six) hours as needed for Wheezing or Shortness of Breath. Rescue 18 g 1    ALBUTEROL, REFILL, INHL       dapsone (ACZONE) 7.5 % GlwP SMARTSIG:sparingly Topical Daily      dasatinib (SPRYCEL) 100 mg Take 1 tablet (100 mg total) by mouth once daily. 30 tablet 11    ivermectin (SOOLANTRA) 1 % Crea Apply topically every evening.      loratadine (CLARITIN) 10 mg tablet Take 10 mg by mouth once daily.      losartan (COZAAR) 25 MG tablet TAKE 1/2 TABLET(12.5 MG) BY MOUTH DAILY 45 tablet 0    mupirocin (BACTROBAN) 2 % ointment Apply topically 2 (two) times daily. 22 g 0    ondansetron (ZOFRAN) 4 MG tablet Take 1 tablet (4 mg total) by mouth every 6 (six) hours as needed for Nausea. 30 tablet 11    tirzepatide, weight loss, (ZEPBOUND) 5 mg/0.5 mL PnIj Inject 5 mg into the skin every 7 days.      triamcinolone acetonide 0.1% (KENALOG) 0.1 % ointment SMARTSIG:In Eye(s) 1-2 Times Daily      valACYclovir (VALTREX) 1000 MG tablet Take 1 tablet (1,000 mg total) by mouth 2 (two) times daily. 30 tablet 6     No current facility-administered medications on file prior to visit.   [3]   Social History  Tobacco Use    Smoking status: Never     Passive exposure: Never    Smokeless tobacco: Never   Substance Use Topics    Alcohol use: Yes     Comment: Rare    Drug use: Never

## 2025-07-31 LAB
W ALTERNARIA ALTERNATA, CLASS: NORMAL
W ALTERNARIA ALTERNATA, IGE: <0.1 KU/L
W ASPERGILLUS FUMIGATUS, CLASS: NORMAL
W ASPERGILLUS FUMIGATUS, IGE: <0.1 KU/L
W BAHIA GRASS, CLASS: NORMAL
W BAHIA GRASS, IGE: <0.1 KU/L
W BERMUDA GRASS, CLASS: NORMAL
W BERMUDA GRASS, IGE: <0.1 KU/L
W CAT DANDER, CLASS: NORMAL
W CAT DANDER, IGE: <0.1 KU/L
W CEDAR, CLASS: NORMAL
W CEDAR, IGE: <0.1 KU/L
W COTTONWOOD, CLASS: NORMAL
W COTTONWOOD, IGE: <0.1 KU/L
W DERMATOPHAGOIDES FARINAE CLASS: NORMAL
W DERMATOPHAGOIDES FARINAE, IGE: <0.1 KU/L
W DERMATOPHAGOIDES PTERONYSSINUS CLASS: NORMAL
W DERMATOPHAGOIDES PTERONYSSINUS, IGE: <0.1 KU/L
W DOG DANDER, CLASS: NORMAL
W DOG DANDER, IGE: <0.1 KU/L
W ENGLISH PLANTAIN, RIBWORT, CLASS: NORMAL
W ENGLISH PLANTAIN, RIBWORT, IGE: <0.1 KU/L
W JOHNSON GRASS, CLASS: NORMAL
W JOHNSON GRASS, IGE: <0.1 KU/L
W MAPLE (BOX ELDER), CLASS: NORMAL
W MAPLE (BOX ELDER), IGE: <0.1 KU/L
W OAK, CLASS: NORMAL
W OAK, IGE: <0.1 KU/L
W PECAN, HICKORY, CLASS: NORMAL
W PECAN, HICKORY, IGE: <0.1 KU/L
W ROUGH MARSHELDER, CLASS: NORMAL
W ROUGH MARSHELDER, IGE: <0.1 KU/L
W SALTWORT, RUSSIAN THISTLE, CLASS: NORMAL
W SALTWORT, RUSSIAN THISTLE, IGE: <0.1 KU/L
W SHEEP SORREL, CLASS: NORMAL
W SHEEP SORREL, IGE: <0.1 KU/L
W TIMOTHY GRASS, CLASS: NORMAL
W TIMOTHY GRASS, IGE: <0.1 KU/L
W WESTERN RAGWEED, CLASS: NORMAL
W WESTERN RAGWEED, IGE: <0.1 KU/L
W WHEAT, CLASS: NORMAL
W WHEAT, IGE: <0.1 KU/L

## 2025-08-07 ENCOUNTER — PATIENT MESSAGE (OUTPATIENT)
Dept: ALLERGY | Facility: CLINIC | Age: 50
End: 2025-08-07
Payer: COMMERCIAL

## 2025-08-13 ENCOUNTER — PATIENT MESSAGE (OUTPATIENT)
Dept: INTERNAL MEDICINE | Facility: CLINIC | Age: 50
End: 2025-08-13

## 2025-08-13 ENCOUNTER — LAB VISIT (OUTPATIENT)
Dept: LAB | Facility: HOSPITAL | Age: 50
End: 2025-08-13
Payer: COMMERCIAL

## 2025-08-13 ENCOUNTER — HOSPITAL ENCOUNTER (OUTPATIENT)
Dept: RADIOLOGY | Facility: HOSPITAL | Age: 50
Discharge: HOME | End: 2025-08-13
Attending: NURSE PRACTITIONER
Payer: COMMERCIAL

## 2025-08-13 ENCOUNTER — OFFICE VISIT (OUTPATIENT)
Dept: INTERNAL MEDICINE | Facility: CLINIC | Age: 50
End: 2025-08-13
Payer: COMMERCIAL

## 2025-08-13 VITALS
DIASTOLIC BLOOD PRESSURE: 78 MMHG | WEIGHT: 171.31 LBS | TEMPERATURE: 98 F | SYSTOLIC BLOOD PRESSURE: 110 MMHG | OXYGEN SATURATION: 99 % | RESPIRATION RATE: 18 BRPM | HEIGHT: 60 IN | BODY MASS INDEX: 33.63 KG/M2 | HEART RATE: 79 BPM

## 2025-08-13 DIAGNOSIS — R22.1 NECK MASS: ICD-10-CM

## 2025-08-13 DIAGNOSIS — R49.0 VOICE HOARSENESS: ICD-10-CM

## 2025-08-13 DIAGNOSIS — D84.821 DRUG-INDUCED IMMUNODEFICIENCY: ICD-10-CM

## 2025-08-13 DIAGNOSIS — C92.10 CML (CHRONIC MYELOCYTIC LEUKEMIA): ICD-10-CM

## 2025-08-13 DIAGNOSIS — R22.1 NECK MASS: Primary | ICD-10-CM

## 2025-08-13 DIAGNOSIS — Z79.899 DRUG-INDUCED IMMUNODEFICIENCY: ICD-10-CM

## 2025-08-13 LAB
ABSOLUTE EOSINOPHIL (OHS): 0.11 K/UL
ABSOLUTE MONOCYTE (OHS): 0.31 K/UL (ref 0.3–1)
ABSOLUTE NEUTROPHIL COUNT (OHS): 3.57 K/UL (ref 1.8–7.7)
BASOPHILS # BLD AUTO: 0.01 K/UL
BASOPHILS NFR BLD AUTO: 0.2 %
ERYTHROCYTE [DISTWIDTH] IN BLOOD BY AUTOMATED COUNT: 13.9 % (ref 11.5–14.5)
HCT VFR BLD AUTO: 32.1 % (ref 37–48.5)
HGB BLD-MCNC: 10.3 GM/DL (ref 12–16)
IMM GRANULOCYTES # BLD AUTO: 0.02 K/UL (ref 0–0.04)
IMM GRANULOCYTES NFR BLD AUTO: 0.4 % (ref 0–0.5)
LYMPHOCYTES # BLD AUTO: 1.68 K/UL (ref 1–4.8)
MCH RBC QN AUTO: 29.7 PG (ref 27–31)
MCHC RBC AUTO-ENTMCNC: 32.1 G/DL (ref 32–36)
MCV RBC AUTO: 93 FL (ref 82–98)
NUCLEATED RBC (/100WBC) (OHS): 0 /100 WBC
PLATELET # BLD AUTO: 203 K/UL (ref 150–450)
PMV BLD AUTO: 10.6 FL (ref 9.2–12.9)
RBC # BLD AUTO: 3.47 M/UL (ref 4–5.4)
RELATIVE EOSINOPHIL (OHS): 1.9 %
RELATIVE LYMPHOCYTE (OHS): 29.5 % (ref 18–48)
RELATIVE MONOCYTE (OHS): 5.4 % (ref 4–15)
RELATIVE NEUTROPHIL (OHS): 62.6 % (ref 38–73)
TSH SERPL-ACNC: 1.68 UIU/ML (ref 0.4–4)
WBC # BLD AUTO: 5.7 K/UL (ref 3.9–12.7)

## 2025-08-13 PROCEDURE — 3078F DIAST BP <80 MM HG: CPT | Mod: CPTII,S$GLB,, | Performed by: NURSE PRACTITIONER

## 2025-08-13 PROCEDURE — 76536 US EXAM OF HEAD AND NECK: CPT | Mod: TC

## 2025-08-13 PROCEDURE — 76536 US EXAM OF HEAD AND NECK: CPT | Mod: 26,,, | Performed by: STUDENT IN AN ORGANIZED HEALTH CARE EDUCATION/TRAINING PROGRAM

## 2025-08-13 PROCEDURE — 3008F BODY MASS INDEX DOCD: CPT | Mod: CPTII,S$GLB,, | Performed by: NURSE PRACTITIONER

## 2025-08-13 PROCEDURE — 4010F ACE/ARB THERAPY RXD/TAKEN: CPT | Mod: CPTII,S$GLB,, | Performed by: NURSE PRACTITIONER

## 2025-08-13 PROCEDURE — 99213 OFFICE O/P EST LOW 20 MIN: CPT | Mod: S$GLB,,, | Performed by: NURSE PRACTITIONER

## 2025-08-13 PROCEDURE — 1159F MED LIST DOCD IN RCRD: CPT | Mod: CPTII,S$GLB,, | Performed by: NURSE PRACTITIONER

## 2025-08-13 PROCEDURE — 99999 PR PBB SHADOW E&M-EST. PATIENT-LVL V: CPT | Mod: PBBFAC,,, | Performed by: NURSE PRACTITIONER

## 2025-08-13 PROCEDURE — 85025 COMPLETE CBC W/AUTO DIFF WBC: CPT

## 2025-08-13 PROCEDURE — 84443 ASSAY THYROID STIM HORMONE: CPT

## 2025-08-13 PROCEDURE — 1160F RVW MEDS BY RX/DR IN RCRD: CPT | Mod: CPTII,S$GLB,, | Performed by: NURSE PRACTITIONER

## 2025-08-13 PROCEDURE — 36415 COLL VENOUS BLD VENIPUNCTURE: CPT | Mod: PO

## 2025-08-13 PROCEDURE — 3074F SYST BP LT 130 MM HG: CPT | Mod: CPTII,S$GLB,, | Performed by: NURSE PRACTITIONER

## 2025-08-18 ENCOUNTER — PATIENT MESSAGE (OUTPATIENT)
Dept: HEMATOLOGY/ONCOLOGY | Facility: CLINIC | Age: 50
End: 2025-08-18
Payer: COMMERCIAL

## 2025-08-18 ENCOUNTER — OFFICE VISIT (OUTPATIENT)
Dept: OTOLARYNGOLOGY | Facility: CLINIC | Age: 50
End: 2025-08-18
Payer: COMMERCIAL

## 2025-08-18 VITALS — WEIGHT: 169.06 LBS | BODY MASS INDEX: 33.02 KG/M2

## 2025-08-18 DIAGNOSIS — E04.2 MULTIPLE THYROID NODULES: ICD-10-CM

## 2025-08-18 DIAGNOSIS — C92.10 CML (CHRONIC MYELOCYTIC LEUKEMIA): ICD-10-CM

## 2025-08-18 DIAGNOSIS — R49.0 HOARSENESS OF VOICE: ICD-10-CM

## 2025-08-18 DIAGNOSIS — R22.1 NECK MASS: Primary | ICD-10-CM

## 2025-08-18 PROCEDURE — 4010F ACE/ARB THERAPY RXD/TAKEN: CPT | Mod: CPTII,S$GLB,, | Performed by: OTOLARYNGOLOGY

## 2025-08-18 PROCEDURE — 3008F BODY MASS INDEX DOCD: CPT | Mod: CPTII,S$GLB,, | Performed by: OTOLARYNGOLOGY

## 2025-08-18 PROCEDURE — 1159F MED LIST DOCD IN RCRD: CPT | Mod: CPTII,S$GLB,, | Performed by: OTOLARYNGOLOGY

## 2025-08-18 PROCEDURE — 31575 DIAGNOSTIC LARYNGOSCOPY: CPT | Mod: S$GLB,,, | Performed by: OTOLARYNGOLOGY

## 2025-08-18 PROCEDURE — 99999 PR PBB SHADOW E&M-EST. PATIENT-LVL III: CPT | Mod: PBBFAC,,, | Performed by: OTOLARYNGOLOGY

## 2025-08-18 PROCEDURE — 99204 OFFICE O/P NEW MOD 45 MIN: CPT | Mod: 25,S$GLB,, | Performed by: OTOLARYNGOLOGY

## 2025-08-18 PROCEDURE — 1160F RVW MEDS BY RX/DR IN RCRD: CPT | Mod: CPTII,S$GLB,, | Performed by: OTOLARYNGOLOGY

## 2025-08-20 ENCOUNTER — PATIENT MESSAGE (OUTPATIENT)
Dept: OBSTETRICS AND GYNECOLOGY | Facility: CLINIC | Age: 50
End: 2025-08-20
Payer: COMMERCIAL

## 2025-08-25 ENCOUNTER — HOSPITAL ENCOUNTER (OUTPATIENT)
Dept: RADIOLOGY | Facility: HOSPITAL | Age: 50
Discharge: HOME | End: 2025-08-25
Attending: OTOLARYNGOLOGY
Payer: COMMERCIAL

## 2025-08-25 DIAGNOSIS — R22.1 NECK MASS: ICD-10-CM

## 2025-08-25 PROCEDURE — 25500020 PHARM REV CODE 255: Performed by: OTOLARYNGOLOGY

## 2025-08-25 PROCEDURE — 70491 CT SOFT TISSUE NECK W/DYE: CPT | Mod: TC

## 2025-08-25 PROCEDURE — 70491 CT SOFT TISSUE NECK W/DYE: CPT | Mod: 26,,, | Performed by: RADIOLOGY

## 2025-08-25 RX ADMIN — IOHEXOL 75 ML: 350 INJECTION, SOLUTION INTRAVENOUS at 09:08

## 2025-08-26 ENCOUNTER — PATIENT MESSAGE (OUTPATIENT)
Dept: OTOLARYNGOLOGY | Facility: CLINIC | Age: 50
End: 2025-08-26
Payer: COMMERCIAL

## 2025-08-27 ENCOUNTER — PATIENT MESSAGE (OUTPATIENT)
Dept: OBSTETRICS AND GYNECOLOGY | Facility: CLINIC | Age: 50
End: 2025-08-27
Payer: COMMERCIAL

## 2025-08-27 DIAGNOSIS — E66.01 SEVERE OBESITY (BMI 35.0-39.9) WITH COMORBIDITY: ICD-10-CM

## 2025-08-27 RX ORDER — TIRZEPATIDE 5 MG/.5ML
INJECTION, SOLUTION SUBCUTANEOUS
Qty: 2 ML | Refills: 2 | Status: SHIPPED | OUTPATIENT
Start: 2025-08-27

## 2025-08-27 RX ORDER — TIRZEPATIDE 5 MG/.5ML
INJECTION, SOLUTION SUBCUTANEOUS
Qty: 2 ML | Refills: 2
Start: 2025-08-27 | End: 2025-08-27

## 2025-08-28 DIAGNOSIS — E04.2 MULTIPLE THYROID NODULES: ICD-10-CM

## 2025-08-28 DIAGNOSIS — R22.1 NECK MASS: Primary | ICD-10-CM

## 2025-08-29 DIAGNOSIS — C92.10 CML (CHRONIC MYELOCYTIC LEUKEMIA): Primary | ICD-10-CM

## 2025-09-03 ENCOUNTER — TELEPHONE (OUTPATIENT)
Dept: INTERVENTIONAL RADIOLOGY/VASCULAR | Facility: CLINIC | Age: 50
End: 2025-09-03
Payer: COMMERCIAL